# Patient Record
Sex: MALE | Race: WHITE | HISPANIC OR LATINO | Employment: FULL TIME | URBAN - METROPOLITAN AREA
[De-identification: names, ages, dates, MRNs, and addresses within clinical notes are randomized per-mention and may not be internally consistent; named-entity substitution may affect disease eponyms.]

---

## 2017-01-30 RX ORDER — LOSARTAN POTASSIUM 50 MG/1
TABLET ORAL
Qty: 30 TABLET | Refills: 0 | Status: SHIPPED | OUTPATIENT
Start: 2017-01-30 | End: 2017-02-28 | Stop reason: SDUPTHER

## 2017-01-31 NOTE — TELEPHONE ENCOUNTER
Patient is due for a follow up appointment - please review lab order section and draw any before appointment if applicable. Thank you.

## 2017-01-31 NOTE — TELEPHONE ENCOUNTER
Left message for patient to call office.  Per Dr. Mathews's notes patient needs a follow-up appt and lab appt.

## 2017-02-08 DIAGNOSIS — Z00.00 ROUTINE GENERAL MEDICAL EXAMINATION AT A HEALTH CARE FACILITY: Primary | ICD-10-CM

## 2017-02-17 ENCOUNTER — HOSPITAL ENCOUNTER (OUTPATIENT)
Dept: RADIOLOGY | Facility: HOSPITAL | Age: 52
Discharge: HOME | End: 2017-02-17
Attending: INTERNAL MEDICINE
Payer: COMMERCIAL

## 2017-02-17 ENCOUNTER — CLINICAL SUPPORT (OUTPATIENT)
Dept: INTERNAL MEDICINE | Facility: CLINIC | Age: 52
End: 2017-02-17
Payer: COMMERCIAL

## 2017-02-17 ENCOUNTER — OFFICE VISIT (OUTPATIENT)
Dept: PULMONOLOGY | Facility: CLINIC | Age: 52
End: 2017-02-17
Payer: COMMERCIAL

## 2017-02-17 ENCOUNTER — HOSPITAL ENCOUNTER (OUTPATIENT)
Dept: CARDIOLOGY | Facility: CLINIC | Age: 52
Discharge: HOME | End: 2017-02-17
Payer: COMMERCIAL

## 2017-02-17 DIAGNOSIS — M25.511 CHRONIC RIGHT SHOULDER PAIN: ICD-10-CM

## 2017-02-17 DIAGNOSIS — Z00.00 ROUTINE GENERAL MEDICAL EXAMINATION AT A HEALTH CARE FACILITY: ICD-10-CM

## 2017-02-17 DIAGNOSIS — Z00.00 ROUTINE GENERAL MEDICAL EXAMINATION AT A HEALTH CARE FACILITY: Primary | ICD-10-CM

## 2017-02-17 DIAGNOSIS — M25.511 CHRONIC RIGHT SHOULDER PAIN: Primary | ICD-10-CM

## 2017-02-17 DIAGNOSIS — Z00.00 ANNUAL PHYSICAL EXAM: Primary | ICD-10-CM

## 2017-02-17 DIAGNOSIS — G89.29 CHRONIC RIGHT SHOULDER PAIN: ICD-10-CM

## 2017-02-17 DIAGNOSIS — G89.29 CHRONIC RIGHT SHOULDER PAIN: Primary | ICD-10-CM

## 2017-02-17 LAB
ALBUMIN SERPL BCP-MCNC: 4.2 G/DL
ALP SERPL-CCNC: 46 U/L
ALT SERPL W/O P-5'-P-CCNC: 63 U/L
ANION GAP SERPL CALC-SCNC: 9 MMOL/L
AST SERPL-CCNC: 39 U/L
BILIRUB SERPL-MCNC: 0.7 MG/DL
BUN SERPL-MCNC: 16 MG/DL
CALCIUM SERPL-MCNC: 10.1 MG/DL
CHLORIDE SERPL-SCNC: 102 MMOL/L
CHOLEST/HDLC SERPL: 3.8 {RATIO}
CO2 SERPL-SCNC: 28 MMOL/L
COMPLEXED PSA SERPL-MCNC: 4.1 NG/ML
CREAT SERPL-MCNC: 1.3 MG/DL
ERYTHROCYTE [DISTWIDTH] IN BLOOD BY AUTOMATED COUNT: 14.3 %
EST. GFR  (AFRICAN AMERICAN): >60 ML/MIN/1.73 M^2
EST. GFR  (NON AFRICAN AMERICAN): >60 ML/MIN/1.73 M^2
GLUCOSE SERPL-MCNC: 91 MG/DL
HCT VFR BLD AUTO: 42.2 %
HDL/CHOLESTEROL RATIO: 26.4 %
HDLC SERPL-MCNC: 148 MG/DL
HDLC SERPL-MCNC: 39 MG/DL
HGB BLD-MCNC: 14.8 G/DL
LDLC SERPL CALC-MCNC: 82 MG/DL
MCH RBC QN AUTO: 30 PG
MCHC RBC AUTO-ENTMCNC: 35.1 %
MCV RBC AUTO: 85 FL
NONHDLC SERPL-MCNC: 109 MG/DL
PLATELET # BLD AUTO: 315 K/UL
PMV BLD AUTO: 9.6 FL
POTASSIUM SERPL-SCNC: 4.2 MMOL/L
PROT SERPL-MCNC: 7.5 G/DL
RBC # BLD AUTO: 4.94 M/UL
SODIUM SERPL-SCNC: 139 MMOL/L
TRIGL SERPL-MCNC: 135 MG/DL
WBC # BLD AUTO: 6.25 K/UL

## 2017-02-17 PROCEDURE — 80061 LIPID PANEL: CPT

## 2017-02-17 PROCEDURE — 73030 X-RAY EXAM OF SHOULDER: CPT | Mod: TC,RT

## 2017-02-17 PROCEDURE — 99396 PREV VISIT EST AGE 40-64: CPT | Mod: S$GLB,,, | Performed by: INTERNAL MEDICINE

## 2017-02-17 PROCEDURE — 73030 X-RAY EXAM OF SHOULDER: CPT | Mod: 26,RT,, | Performed by: RADIOLOGY

## 2017-02-17 PROCEDURE — 84153 ASSAY OF PSA TOTAL: CPT

## 2017-02-17 PROCEDURE — 99999 PR PBB SHADOW E&M-EST. PATIENT-LVL II: CPT | Mod: PBBFAC,,, | Performed by: INTERNAL MEDICINE

## 2017-02-17 PROCEDURE — 71020 XR CHEST PA AND LATERAL: CPT | Mod: TC

## 2017-02-17 PROCEDURE — 85027 COMPLETE CBC AUTOMATED: CPT

## 2017-02-17 PROCEDURE — 71020 XR CHEST PA AND LATERAL: CPT | Mod: 26,,, | Performed by: RADIOLOGY

## 2017-02-17 PROCEDURE — 93000 ELECTROCARDIOGRAM COMPLETE: CPT | Mod: S$GLB,,, | Performed by: INTERNAL MEDICINE

## 2017-02-17 PROCEDURE — 80053 COMPREHEN METABOLIC PANEL: CPT

## 2017-02-17 NOTE — LETTER
February 17, 2017    Jerome Almazan  [address] [place] [address]             [facility] - Pulmonary Services  [address]  [place] [address]  Phone: [phone] Dear Mr. Almazan:    Thank you for allowing me to serve you and perform your Executive Health exam on 2/17/2017. This letter will serve as a brief summary of the physical findings and laboratory/studies performed and recommendations at this time. At this visit there is a minor elevation of one liver enzyme and the PSA is 4.1 (<4.0). I would ask to stop using any testosterone products and in June contact me so I gave repeat those two lab tests. The x-ray of the right shoulder is clear, no defects seen.          If you have any questions or concerns, please don't hesitate to call.    Sincerely,        aJylen Rasmussen MD

## 2017-02-17 NOTE — PROGRESS NOTES
Subjective:       Patient ID: Jerome Almazan is a 51 y.o. male.    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 He is an avid weight , but has reduced his training and is no longer using heavy weight trying to be a . He has a knot on his right shoulder that he is concerned about. Is doesn't limit his activities. No significant illnesses since his last visit.  Review of Systems   Constitutional: Negative.    HENT: Negative.    Eyes: Negative.    Respiratory: Negative.    Cardiovascular: Negative.    Gastrointestinal: Negative.    Genitourinary: Negative.    Musculoskeletal: Negative.         Lump on right supraclavicular area. Not a node feels like a andrés prominence   Skin: Negative.    Neurological: Negative.    Psychiatric/Behavioral: Negative.    All other systems reviewed and are negative.      Objective:      Physical Exam   Constitutional: He is oriented to person, place, and time. He appears well-developed and well-nourished.   HENT:   Head: Normocephalic and atraumatic.   Right Ear: External ear normal.   Left Ear: External ear normal.   Eyes: Conjunctivae and EOM are normal. Pupils are equal, round, and reactive to light.   Neck: Normal range of motion. Neck supple.   Cardiovascular: Normal rate, regular rhythm and normal heart sounds.    Pulmonary/Chest: Effort normal and breath sounds normal.   Peak flow 550 l/min   Abdominal: Soft. Bowel sounds are normal.   Musculoskeletal: Normal range of motion.   Neurological: He is alert and oriented to person, place, and time. He has normal reflexes.   Skin: Skin is warm and dry.   Psychiatric: He has a normal mood and affect. His behavior is normal. Judgment and thought content normal.       Assessment:       No diagnosis found.    Plan:       Labs: A mild elevation of transaminase: SGPT ,and PSA is 4.1 need  To repeat both in six months. Chest x-ray is clear.  Shoulder x-ray is negative and EKG is normal. IMP Minimal elevation of PSA refrain from using testosterone produces.

## 2017-02-28 DIAGNOSIS — R97.20 ABNORMAL PSA: Primary | ICD-10-CM

## 2017-02-28 RX ORDER — LOSARTAN POTASSIUM 50 MG/1
TABLET ORAL
Qty: 30 TABLET | Refills: 2 | Status: SHIPPED | OUTPATIENT
Start: 2017-02-28 | End: 2017-05-02 | Stop reason: SDUPTHER

## 2017-03-01 NOTE — TELEPHONE ENCOUNTER
Spoke with pt in regards to scheduling urology appt. Pt stated that Dr. Rasmussen informed him to stop taking/using testosterone product and FU with urology in 6 months to see if PSA level dropped as a result of discontinuing testosterone. Pt stated that he would rather hold off on scheduling urology appt at this time.

## 2017-05-02 RX ORDER — LOSARTAN POTASSIUM 50 MG/1
50 TABLET ORAL DAILY
Qty: 30 TABLET | Refills: 0 | Status: SHIPPED | OUTPATIENT
Start: 2017-05-02 | End: 2017-11-17 | Stop reason: SDUPTHER

## 2017-05-02 NOTE — TELEPHONE ENCOUNTER
----- Message from Mae Saucedo sent at 5/2/2017  2:35 PM CDT -----  Contact: Pt  Pt is calling to have medication authorization called in to pharmacy for losartan (COZAAR) 50 MG tablet.    [facility] can be reached at [phone].  Pt can be reached at [phone].    Thank you

## 2017-06-01 RX ORDER — LOSARTAN POTASSIUM 50 MG/1
TABLET ORAL
Qty: 30 TABLET | Refills: 0 | Status: SHIPPED | OUTPATIENT
Start: 2017-06-01 | End: 2017-09-07

## 2017-07-05 RX ORDER — LOSARTAN POTASSIUM 50 MG/1
TABLET ORAL
Qty: 30 TABLET | Refills: 0 | Status: SHIPPED | OUTPATIENT
Start: 2017-07-05 | End: 2017-09-07

## 2017-08-04 RX ORDER — LOSARTAN POTASSIUM 50 MG/1
TABLET ORAL
Qty: 30 TABLET | Refills: 0 | Status: SHIPPED | OUTPATIENT
Start: 2017-08-04 | End: 2017-09-07

## 2017-09-07 DIAGNOSIS — I10 HYPERTENSION, ESSENTIAL: Primary | ICD-10-CM

## 2017-09-07 RX ORDER — LOSARTAN POTASSIUM 50 MG/1
TABLET ORAL
Qty: 30 TABLET | Refills: 0 | Status: SHIPPED | OUTPATIENT
Start: 2017-09-07 | End: 2017-11-13

## 2017-09-12 ENCOUNTER — TELEPHONE (OUTPATIENT)
Dept: INTERNAL MEDICINE | Facility: CLINIC | Age: 52
End: 2017-09-12

## 2017-09-12 DIAGNOSIS — R97.20 ABNORMAL PSA: Primary | ICD-10-CM

## 2017-09-12 NOTE — TELEPHONE ENCOUNTER
Please schedule patient for an appointment with me. Will discuss PSA at that time. It looks like was elevated in the past and would need a follow up with urology. I'll place referral too. Thank you.

## 2017-09-12 NOTE — TELEPHONE ENCOUNTER
----- Message from Anjelica Wooten sent at 9/12/2017 10:55 AM CDT -----  Contact: patient [phone]  Pt is returning nurse's call from last week. He really wants to get in to see Dr Mathews for a shot for pain in his shoulder/ check psa. Pt works for [employer] and has been unable to call you back until today. Please call him asap.

## 2017-09-12 NOTE — TELEPHONE ENCOUNTER
Pt states he is starting to have pain in his right shoulder and is requesting a Cortizone shot or an X-ray. Also pt would like labs orders placed to check is PSA. Pt is only available this Friday due to his job. Please advise

## 2017-09-13 NOTE — TELEPHONE ENCOUNTER
----- Message from Alonzo Alphonse sent at 9/13/2017  2:20 PM CDT -----  Contact: Pt   Pt would like the nurse to give him call back in regards to a missed call .. Pt stated as soon as he answered  hung up he was waiting on Doctor Jhoan nurse to give him a call .. Contact number [phone]..

## 2017-11-13 ENCOUNTER — HOSPITAL ENCOUNTER (OUTPATIENT)
Dept: RADIOLOGY | Facility: HOSPITAL | Age: 52
Discharge: HOME | End: 2017-11-13
Attending: FAMILY MEDICINE
Payer: COMMERCIAL

## 2017-11-13 ENCOUNTER — TELEPHONE (OUTPATIENT)
Dept: INTERNAL MEDICINE | Facility: CLINIC | Age: 52
End: 2017-11-13

## 2017-11-13 ENCOUNTER — OFFICE VISIT (OUTPATIENT)
Dept: INTERNAL MEDICINE | Facility: CLINIC | Age: 52
End: 2017-11-13
Attending: FAMILY MEDICINE
Payer: COMMERCIAL

## 2017-11-13 VITALS
WEIGHT: 197.19 LBS | DIASTOLIC BLOOD PRESSURE: 84 MMHG | BODY MASS INDEX: 33.66 KG/M2 | HEIGHT: 64 IN | HEART RATE: 71 BPM | OXYGEN SATURATION: 97 % | SYSTOLIC BLOOD PRESSURE: 136 MMHG

## 2017-11-13 DIAGNOSIS — M75.101 ROTATOR CUFF SYNDROME OF RIGHT SHOULDER: ICD-10-CM

## 2017-11-13 DIAGNOSIS — S43.004A DISLOCATION OF RIGHT SHOULDER JOINT, INITIAL ENCOUNTER: Primary | ICD-10-CM

## 2017-11-13 DIAGNOSIS — S43.004A DISLOCATION OF RIGHT SHOULDER JOINT, INITIAL ENCOUNTER: ICD-10-CM

## 2017-11-13 DIAGNOSIS — R97.20 ABNORMAL PSA: ICD-10-CM

## 2017-11-13 DIAGNOSIS — M75.101 ROTATOR CUFF SYNDROME OF RIGHT SHOULDER: Primary | ICD-10-CM

## 2017-11-13 PROCEDURE — 73000 X-RAY EXAM OF COLLAR BONE: CPT | Mod: 26,RT,, | Performed by: RADIOLOGY

## 2017-11-13 PROCEDURE — 73000 X-RAY EXAM OF COLLAR BONE: CPT | Mod: TC,RT

## 2017-11-13 PROCEDURE — 99999 PR PBB SHADOW E&M-EST. PATIENT-LVL V: CPT | Mod: PBBFAC,,, | Performed by: FAMILY MEDICINE

## 2017-11-13 PROCEDURE — 73030 X-RAY EXAM OF SHOULDER: CPT | Mod: TC,RT

## 2017-11-13 PROCEDURE — 73030 X-RAY EXAM OF SHOULDER: CPT | Mod: 26,RT,, | Performed by: RADIOLOGY

## 2017-11-13 PROCEDURE — 99214 OFFICE O/P EST MOD 30 MIN: CPT | Mod: S$GLB,,, | Performed by: FAMILY MEDICINE

## 2017-11-13 RX ORDER — TRAMADOL HYDROCHLORIDE 50 MG/1
50-100 TABLET ORAL 2 TIMES DAILY PRN
Qty: 20 TABLET | Refills: 0 | Status: SHIPPED | OUTPATIENT
Start: 2017-11-13 | End: 2018-02-16

## 2017-11-13 NOTE — TELEPHONE ENCOUNTER
Please add patient in at the 3:20 time spot as an override, but his is coming in at 11:00 - see stat orders for R shoulder xray and go ahead and get those when he gets here. Thank you.

## 2017-11-13 NOTE — TELEPHONE ENCOUNTER
----- Message from Janice Rod sent at 11/13/2017  8:26 AM CST -----  Contact: self/[phone]  Patient called in regards needing to talk with Dr Mathews about getting an appointment today, patient dislocate his right shoulder. Please call and advise.       Thank you!!!

## 2017-11-13 NOTE — TELEPHONE ENCOUNTER
Spoke to pt. Pt would like to be seen today he states that he dislocated his shoulder last night and then popped it back into place. He states that he is in severe pain. A urgent care appointment with other colleges was offered. Pt declined the urgent care appointment he states that he only wants to see his pcp and today. Pt would like a call back before 12 noon.  Please advise

## 2017-11-13 NOTE — PROGRESS NOTES
Subjective:       Patient ID: Jerome Almazan is a 52 y.o. male.    Chief Complaint: Shoulder Injury (rt shoulder )    HPI  Review of Systems   Constitutional: Negative for chills, fatigue and fever.   HENT: Negative for congestion and trouble swallowing.    Eyes: Negative for redness.   Respiratory: Negative for cough, chest tightness and shortness of breath.    Cardiovascular: Negative for chest pain, palpitations and leg swelling.   Gastrointestinal: Negative for abdominal pain and blood in stool.   Genitourinary: Negative for hematuria.   Musculoskeletal: Positive for arthralgias. Negative for back pain, gait problem, joint swelling, myalgias and neck pain.   Skin: Negative for color change and rash.   Neurological: Negative for tremors, speech difficulty, weakness, numbness and headaches.   Hematological: Negative for adenopathy. Bruises/bleeds easily.   Psychiatric/Behavioral: Negative for behavioral problems, confusion and sleep disturbance. The patient is not nervous/anxious.        Objective:      Physical Exam   Constitutional: He is oriented to person, place, and time. He appears well-developed and well-nourished. No distress.   Neck: Normal range of motion and full passive range of motion without pain. Neck supple. No thyroid mass present.   Pulmonary/Chest: Effort normal.   Musculoskeletal: He exhibits no edema.        Right shoulder: He exhibits decreased range of motion and tenderness. He exhibits no bony tenderness, no swelling, no effusion, no crepitus, no deformity, no laceration, no pain, no spasm, normal pulse and normal strength.        Left shoulder: He exhibits normal range of motion, no tenderness, no bony tenderness, no swelling, no effusion, no crepitus, no deformity, no laceration, no pain, no spasm, normal pulse and normal strength.        Cervical back: He exhibits normal range of motion, no tenderness and no spasm.        Right hand: He exhibits normal range of motion, no  tenderness, normal capillary refill and no swelling. Normal sensation noted. Decreased sensation is not present in the ulnar distribution, is not present in the medial distribution and is not present in the radial distribution. Normal strength noted. He exhibits no finger abduction, no thumb/finger opposition and no wrist extension trouble.        Left hand: He exhibits normal range of motion, no tenderness, normal capillary refill and no swelling. Normal sensation noted. Decreased sensation is not present in the ulnar distribution, is not present in the medial redistribution and is not present in the radial distribution. Normal strength noted. He exhibits no finger abduction, no thumb/finger opposition and no wrist extension trouble.        Right lower leg: He exhibits no edema.        Left lower leg: He exhibits no edema.   Neurological: He is alert and oriented to person, place, and time. He has normal strength. No cranial nerve deficit or sensory deficit. Coordination and gait normal.   Reflex Scores:       Tricep reflexes are 2+ on the right side and 2+ on the left side.       Bicep reflexes are 2+ on the right side and 2+ on the left side.       Brachioradialis reflexes are 2+ on the right side and 2+ on the left side.  Skin: Skin is warm and dry. No rash noted.   Psychiatric: He has a normal mood and affect. His behavior is normal. Judgment and thought content normal.   Nursing note and vitals reviewed.      Assessment:       1. Rotator cuff syndrome of right shoulder    2. Abnormal PSA    3. Dislocation of right shoulder joint, initial encounter        Plan:   Jerome was seen today for shoulder injury.    Diagnoses and all orders for this visit:    Rotator cuff syndrome of right shoulder  -     MRI Upper Extremity Joint Without Contraast Right; Future  -     Ambulatory Referral to Sports Medicine  -     X-Ray Clavicle Right; Future    Abnormal PSA  -     Ambulatory referral to Urology    Dislocation of  right shoulder joint, initial encounter  -     MRI Upper Extremity Joint Without Contraast Right; Future  -     Ambulatory Referral to Sports Medicine  -     X-Ray Clavicle Right; Future    Other orders  -     traMADol (ULTRAM) 50 mg tablet; Take 1-2 tablets ( mg total) by mouth 2 (two) times daily as needed for Pain.      See meds, orders, follow up, routing and instructions sections of encounter.  A 52-year-old established male patient who is in for right shoulder, states he   dislocated it yesterday, he fell over a trash can at his yard.  He reports pain   anteriorly over the mid clavicle area.  He states yesterday there was a bump.    He pulled his arm forward with his other arm and states the patellar clunk.    Motion is decreased.  He has no numbness, tingling, weakness or loss of function   distally.    PHYSICAL EXAMINATION:  Reduced AFE, reduced AFE strength, internal and external   rotation were both tender.  He had some reduction in the internal the internal   rotation with the thumb behind the back.    He was tender at the AC, which did not appear  and he had a bruise to   the mid clavicle with no deformity.  His impingement signs are equivocal.    [facility]'s test not available.    Radial, ulnar, and median nerve functions distally were intact, distally   vascular intact.    IMPRESSION:  1.  Questionable right shoulder dislocation.  2.  A six-month history of progressive right shoulder discomfort.  3.  Status post contralateral left rotator cuff repair.  4.  Hypertension.  5.  Elevated PSA.    RECOMMENDATIONS:    1.  He is overdue for Urology followup based on his PSA from [email or web address]   last year.  We will attempt to schedule that.  2.  MRI shoulder, rule out rotator cuff tear and to assess humeral head for bony   contusion.  3.  Clavicle x-ray.  4.  Shoulder x-ray today, normal.      OTILIA/HN  dd: 11/13/2017 12:30:04 (CST)  td: 11/14/2017 00:44:42 (CST)  Doc ID   #[id number]  Job ID  #[id number]    CC:

## 2017-11-14 ENCOUNTER — TELEPHONE (OUTPATIENT)
Dept: INTERNAL MEDICINE | Facility: CLINIC | Age: 52
End: 2017-11-14

## 2017-11-14 ENCOUNTER — HOSPITAL ENCOUNTER (OUTPATIENT)
Dept: RADIOLOGY | Facility: HOSPITAL | Age: 52
Discharge: HOME | End: 2017-11-14
Attending: FAMILY MEDICINE
Payer: COMMERCIAL

## 2017-11-14 DIAGNOSIS — S43.004A DISLOCATION OF RIGHT SHOULDER JOINT, INITIAL ENCOUNTER: ICD-10-CM

## 2017-11-14 DIAGNOSIS — M75.101 ROTATOR CUFF SYNDROME OF RIGHT SHOULDER: ICD-10-CM

## 2017-11-14 PROCEDURE — 73221 MRI JOINT UPR EXTREM W/O DYE: CPT | Mod: TC,RT

## 2017-11-14 PROCEDURE — 73221 MRI JOINT UPR EXTREM W/O DYE: CPT | Mod: 26,RT,, | Performed by: RADIOLOGY

## 2017-11-14 NOTE — TELEPHONE ENCOUNTER
----- Message from Janice Rod sent at 11/14/2017  9:39 AM CST -----  Contact: self/[phone]  Patient called in regards needing to talk with Dr Mathews about his MRI. Patient stated that he can not wait until 11/22/17 and that he will come to the office today. Please call and advise.       Thank you!!!

## 2017-11-14 NOTE — TELEPHONE ENCOUNTER
Spoke w/ pt. Informed him that the earliest appointment at [place] is 11/15 tat 2 pm. If he wanted the MRI to be done today he would have to go to the [facility] location. Pt stated that he would call back.   JOSE Kay

## 2017-11-15 ENCOUNTER — TELEPHONE (OUTPATIENT)
Dept: INTERNAL MEDICINE | Facility: CLINIC | Age: 52
End: 2017-11-15

## 2017-11-15 NOTE — TELEPHONE ENCOUNTER
Pt came to the clinic today. Requesting test results. 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 he is needing a return to work note for the past 3 days, if MRI is clear, if not pt will need FMLA forms filled out. Pt states he has full range of motion and having no pain.  Please advise

## 2017-11-15 NOTE — TELEPHONE ENCOUNTER
Called patient and discussed labs and or test results. Patient expressed understanding and had the opportunity to ask questions. Any questions were answered. See meds, orders, follow up and instructions sections of encounter.    Specific issues include:  MRI discussed - offered, but does not want to see Ortho, wants to go back to work - see letter

## 2017-11-16 ENCOUNTER — TELEPHONE (OUTPATIENT)
Dept: INTERNAL MEDICINE | Facility: CLINIC | Age: 52
End: 2017-11-16

## 2017-11-16 NOTE — TELEPHONE ENCOUNTER
Spoke with pt, he is requesting a new letter be print for him to return back to work on tomorrow 11/17.    Please advise

## 2017-11-16 NOTE — TELEPHONE ENCOUNTER
----- Message from Clover Kay MA sent at 11/16/2017  9:40 AM CST -----  Contact: pt [phone] cell      ----- Message -----  From: Tati Vázquez  Sent: 11/16/2017   9:18 AM  To: Reid ESCOBAR Staff    Pt would like a call from the nurse pt is asking can he get a return back to work for tomorrow 11- at a 100% pt would like it fax to [phone],pt is asking can this be fax in an hour because of his pay roll at work,please advise pt

## 2017-11-17 RX ORDER — LOSARTAN POTASSIUM 50 MG/1
TABLET ORAL
Qty: 90 TABLET | Refills: 0 | Status: SHIPPED | OUTPATIENT
Start: 2017-11-17 | End: 2018-03-14 | Stop reason: SDUPTHER

## 2018-02-05 ENCOUNTER — TELEPHONE (OUTPATIENT)
Dept: INTERNAL MEDICINE | Facility: CLINIC | Age: 53
End: 2018-02-05

## 2018-02-05 DIAGNOSIS — I10 HYPERTENSION, ESSENTIAL: Primary | ICD-10-CM

## 2018-02-05 NOTE — TELEPHONE ENCOUNTER
Please advise ----- Message from Migdalia Lopez sent at 2/5/2018  8:50 AM CST -----  Contact: Patient [phone]  Requesting a cardiologist.    Please call and advise.    Thank You

## 2018-02-16 ENCOUNTER — TELEPHONE (OUTPATIENT)
Dept: CARDIOLOGY | Facility: CLINIC | Age: 53
End: 2018-02-16

## 2018-02-16 ENCOUNTER — HOSPITAL ENCOUNTER (OUTPATIENT)
Dept: CARDIOLOGY | Facility: CLINIC | Age: 53
Discharge: HOME | End: 2018-02-16
Attending: FAMILY MEDICINE
Payer: COMMERCIAL

## 2018-02-16 ENCOUNTER — HOSPITAL ENCOUNTER (OUTPATIENT)
Dept: CARDIOLOGY | Facility: CLINIC | Age: 53
Discharge: HOME | End: 2018-02-16
Attending: INTERNAL MEDICINE
Payer: COMMERCIAL

## 2018-02-16 ENCOUNTER — OFFICE VISIT (OUTPATIENT)
Dept: CARDIOLOGY | Facility: CLINIC | Age: 53
End: 2018-02-16
Payer: COMMERCIAL

## 2018-02-16 VITALS
HEART RATE: 78 BPM | HEIGHT: 64 IN | BODY MASS INDEX: 33.2 KG/M2 | DIASTOLIC BLOOD PRESSURE: 100 MMHG | SYSTOLIC BLOOD PRESSURE: 157 MMHG | WEIGHT: 194.44 LBS

## 2018-02-16 DIAGNOSIS — R07.9 CHEST PAIN AT REST: ICD-10-CM

## 2018-02-16 DIAGNOSIS — I10 HYPERTENSION, ESSENTIAL: ICD-10-CM

## 2018-02-16 DIAGNOSIS — R07.9 CHEST PAIN AT REST: Primary | ICD-10-CM

## 2018-02-16 DIAGNOSIS — Z91.89 AT RISK FOR CORONARY ARTERY DISEASE: ICD-10-CM

## 2018-02-16 LAB — DIASTOLIC DYSFUNCTION: NO

## 2018-02-16 PROCEDURE — 93000 ELECTROCARDIOGRAM COMPLETE: CPT | Mod: S$GLB,,, | Performed by: INTERNAL MEDICINE

## 2018-02-16 PROCEDURE — 99204 OFFICE O/P NEW MOD 45 MIN: CPT | Mod: S$GLB,,, | Performed by: INTERNAL MEDICINE

## 2018-02-16 PROCEDURE — 99999 PR PBB SHADOW E&M-EST. PATIENT-LVL IV: CPT | Mod: PBBFAC,,, | Performed by: INTERNAL MEDICINE

## 2018-02-16 PROCEDURE — 93015 CV STRESS TEST SUPVJ I&R: CPT | Mod: S$GLB,,, | Performed by: INTERNAL MEDICINE

## 2018-02-16 PROCEDURE — 3008F BODY MASS INDEX DOCD: CPT | Mod: S$GLB,,, | Performed by: INTERNAL MEDICINE

## 2018-02-16 NOTE — LETTER
February 16, 2018      Broderick Mathews MD  [address]  [place] [address]           [facility] - Cardiology  [address]  [facility] [address]  Phone: [phone]          Patient: Jerome Almazan   MR Number: [id number]   YOB: 1965   Date of Visit: 2/16/2018       Dear Dr. Broderick Mathews:    Thank you for referring Jerome Almazan to me for evaluation. Attached you will find relevant portions of my assessment and plan of care.    If you have questions, please do not hesitate to call me. I look forward to following Jerome Almazan along with you.    Sincerely,    Esequiel Mccain MD    Enclosure  CC:  No Recipients    If you would like to receive this communication electronically, please contact [email or web address] or [phone] to request more information on [employer] access.    For providers and/or their staff who would like to refer a patient to [facility], please contact us through our one-stop-shop provider referral line, [facility], at [phone].    If you feel you have received this communication in error or would no longer like to receive these types of communications, please e-mail [email or web address]

## 2018-02-16 NOTE — PROGRESS NOTES
"Subjective:   Patient ID:  Jerome Almazan is a 52 y.o. male who presents for evaluation of Hypertension and Chest Pain      HPI:   Jerome Almazan presents for evaluation of chest pain. Over a week ago the patient noted anterior chest pressure that began on the right upper anterior cheat and spread to the left. It lasted all day. No radiation or associated sxs. He has since done aerobic activity with his wife ( walking brislky for 45 minutes ) with no further discomfort. Jerome Almazan has hypertension with elevation this visit but has not taken his medication. HDL low with LDL < 100 last check..Eliaeetyra  of a " heart attack" just shy of 60. Had an a apparent " hole in her heart"  fixed .    Review of Systems   Constitution: Negative for weakness, malaise/fatigue, weight gain and weight loss.   Eyes: Negative for blurred vision.   Cardiovascular: Positive for chest pain. Negative for claudication, cyanosis, dyspnea on exertion, irregular heartbeat, leg swelling, near-syncope, orthopnea, palpitations, paroxysmal nocturnal dyspnea and syncope.   Respiratory: Negative for cough, shortness of breath and wheezing.    Musculoskeletal: Negative for falls and myalgias.   Gastrointestinal: Negative for abdominal pain, heartburn, nausea and vomiting.   Genitourinary: Negative for nocturia.   Neurological: Negative for brief paralysis, dizziness, focal weakness, headaches, numbness and paresthesias.   Psychiatric/Behavioral: Negative for altered mental status.       Current Outpatient Prescriptions   Medication Sig    losartan (COZAAR) 50 MG tablet TAKE 1 TABLET BY MOUTH DAILY     No current facility-administered medications for this visit.      Objective:   Physical Exam   Constitutional: He is oriented to person, place, and time. No distress.   BP (!) 157/100   Pulse 78   Ht 5' 4" (1.626 m)   Wt 88.2 kg (194 lb 7.1 oz)   BMI 33.38 kg/m²    HENT:   Head: Normocephalic.   Right Ear: " External ear normal.   Left Ear: External ear normal.   Nose: Nose normal.   Eyes: Conjunctivae are normal. Pupils are equal, round, and reactive to light. No scleral icterus.   Neck: Neck supple. No JVD present. No thyromegaly present.   Cardiovascular: Normal rate, regular rhythm, normal heart sounds and intact distal pulses.  PMI is not displaced.  Exam reveals no gallop and no friction rub.    No murmur heard.  Pulmonary/Chest: Effort normal and breath sounds normal. No respiratory distress. He has no wheezes. He has no rales.   Abdominal: Soft. He exhibits no distension. There is no hepatosplenomegaly. There is no tenderness.   Musculoskeletal: He exhibits no edema or tenderness.   Gait normal   Neurological: He is alert and oriented to person, place, and time. No cranial nerve deficit.   Skin: Skin is warm and dry. No rash noted. He is not diaphoretic.   Psychiatric: He has a normal mood and affect. His behavior is normal.       Lab Results   Component Value Date     02/17/2017    K 4.2 02/17/2017     02/17/2017    CO2 28 02/17/2017    BUN 16 02/17/2017    CREATININE 1.3 02/17/2017    GLU 91 02/17/2017    AST 39 02/17/2017    ALT 63 (H) 02/17/2017    ALBUMIN 4.2 02/17/2017    PROT 7.5 02/17/2017    BILITOT 0.7 02/17/2017    WBC 6.25 02/17/2017    HGB 14.8 02/17/2017    HCT 42.2 02/17/2017    MCV 85 02/17/2017     02/17/2017    TSH 0.908 12/11/2015    CHOL 148 02/17/2017    HDL 39 (L) 02/17/2017    LDLCALC 82.0 02/17/2017    TRIG 135 02/17/2017       Assessment:     1. Chest pain at rest : Atypical for myocardial ischemia clinically   2. Hypertension, essential : Elevation but didn't take medication   3. At risk for coronary artery disease: Low HDL ,? Family hx, hypertension       Plan:     Jerome was seen today for hypertension and chest pain.    Diagnoses and all orders for this visit:    Chest pain at rest  -     EKG 12-lead; Future  -     Cardiac treadmill stress test;  Future    Hypertension, essential  Suggest he routinely monitor at home  At risk for coronary artery disease  -     CT Cardiac Scoring; Future; Expected date: 02/16/2018  .Mediteranian diet recommended and discussed

## 2018-02-16 NOTE — PATIENT INSTRUCTIONS
It is recommended that  blood pressure be checked 3-4 times a week and a log  maintained brought with you to your Dr the next visit.  .Mediteranian diet recommended

## 2018-03-14 RX ORDER — LOSARTAN POTASSIUM 50 MG/1
TABLET ORAL
Qty: 90 TABLET | Refills: 0 | Status: SHIPPED | OUTPATIENT
Start: 2018-03-14 | End: 2018-06-21 | Stop reason: SDUPTHER

## 2018-04-11 ENCOUNTER — TELEPHONE (OUTPATIENT)
Dept: INTERNAL MEDICINE | Facility: CLINIC | Age: 53
End: 2018-04-11

## 2018-04-11 NOTE — TELEPHONE ENCOUNTER
Pt called stated that he had a wellness check up with his job and his glucose results was 65. Pt is concerned that he might be a diabetic. Please advise

## 2018-04-17 DIAGNOSIS — Z00.00 ROUTINE GENERAL MEDICAL EXAMINATION AT A HEALTH CARE FACILITY: Primary | ICD-10-CM

## 2018-04-27 ENCOUNTER — OFFICE VISIT (OUTPATIENT)
Dept: PULMONOLOGY | Facility: CLINIC | Age: 53
End: 2018-04-27
Payer: COMMERCIAL

## 2018-04-27 ENCOUNTER — CLINICAL SUPPORT (OUTPATIENT)
Dept: INTERNAL MEDICINE | Facility: CLINIC | Age: 53
End: 2018-04-27
Payer: COMMERCIAL

## 2018-04-27 ENCOUNTER — HOSPITAL ENCOUNTER (OUTPATIENT)
Dept: CARDIOLOGY | Facility: CLINIC | Age: 53
Discharge: HOME | End: 2018-04-27
Payer: COMMERCIAL

## 2018-04-27 VITALS
WEIGHT: 190.5 LBS | HEIGHT: 64 IN | DIASTOLIC BLOOD PRESSURE: 92 MMHG | BODY MASS INDEX: 32.52 KG/M2 | SYSTOLIC BLOOD PRESSURE: 141 MMHG | HEART RATE: 73 BPM

## 2018-04-27 DIAGNOSIS — Z00.00 ROUTINE GENERAL MEDICAL EXAMINATION AT A HEALTH CARE FACILITY: ICD-10-CM

## 2018-04-27 DIAGNOSIS — Z00.00 ROUTINE GENERAL MEDICAL EXAMINATION AT A HEALTH CARE FACILITY: Primary | ICD-10-CM

## 2018-04-27 DIAGNOSIS — Z00.00 ANNUAL PHYSICAL EXAM: Primary | ICD-10-CM

## 2018-04-27 LAB
ALBUMIN SERPL BCP-MCNC: 4.2 G/DL
ALP SERPL-CCNC: 49 U/L
ALT SERPL W/O P-5'-P-CCNC: 41 U/L
ANION GAP SERPL CALC-SCNC: 8 MMOL/L
AST SERPL-CCNC: 27 U/L
BILIRUB SERPL-MCNC: 0.4 MG/DL
BUN SERPL-MCNC: 29 MG/DL
CALCIUM SERPL-MCNC: 9.9 MG/DL
CHLORIDE SERPL-SCNC: 105 MMOL/L
CHOLEST SERPL-MCNC: 150 MG/DL
CHOLEST/HDLC SERPL: 4.3 {RATIO}
CO2 SERPL-SCNC: 27 MMOL/L
COMPLEXED PSA SERPL-MCNC: 2.9 NG/ML
CREAT SERPL-MCNC: 1.4 MG/DL
ERYTHROCYTE [DISTWIDTH] IN BLOOD BY AUTOMATED COUNT: 13.6 %
EST. GFR  (AFRICAN AMERICAN): >60 ML/MIN/1.73 M^2
EST. GFR  (NON AFRICAN AMERICAN): 56.6 ML/MIN/1.73 M^2
GLUCOSE SERPL-MCNC: 98 MG/DL
HCT VFR BLD AUTO: 42.4 %
HDLC SERPL-MCNC: 35 MG/DL
HDLC SERPL: 23.3 %
HGB BLD-MCNC: 14 G/DL
LDLC SERPL CALC-MCNC: 93.2 MG/DL
MCH RBC QN AUTO: 28.9 PG
MCHC RBC AUTO-ENTMCNC: 33 G/DL
MCV RBC AUTO: 87 FL
NONHDLC SERPL-MCNC: 115 MG/DL
PLATELET # BLD AUTO: 275 K/UL
PMV BLD AUTO: 9.6 FL
POTASSIUM SERPL-SCNC: 4.5 MMOL/L
PROT SERPL-MCNC: 7.4 G/DL
RBC # BLD AUTO: 4.85 M/UL
SODIUM SERPL-SCNC: 140 MMOL/L
TRIGL SERPL-MCNC: 109 MG/DL
WBC # BLD AUTO: 6.93 K/UL

## 2018-04-27 PROCEDURE — 85027 COMPLETE CBC AUTOMATED: CPT

## 2018-04-27 PROCEDURE — 80061 LIPID PANEL: CPT

## 2018-04-27 PROCEDURE — 80053 COMPREHEN METABOLIC PANEL: CPT

## 2018-04-27 PROCEDURE — 84153 ASSAY OF PSA TOTAL: CPT

## 2018-04-27 PROCEDURE — 99396 PREV VISIT EST AGE 40-64: CPT | Mod: S$GLB,,, | Performed by: INTERNAL MEDICINE

## 2018-04-27 PROCEDURE — 99999 PR PBB SHADOW E&M-EST. PATIENT-LVL III: CPT | Mod: PBBFAC,,, | Performed by: INTERNAL MEDICINE

## 2018-04-27 PROCEDURE — 93000 ELECTROCARDIOGRAM COMPLETE: CPT | Mod: S$GLB,,, | Performed by: INTERNAL MEDICINE

## 2018-04-27 NOTE — LETTER
April 30, 2018    Jerome Almazan  [address] [place] [address]             [facility] - Pulmonary Services  [address]  [facility] [address]  Phone: [phone] Dear Mr. Almazan:    Thank you for allowing me to serve you and perform your Executive Health exam on 4/27/2018. This letter will serve as a brief summary of the physical findings and laboratory/studies performed and recommendations at this time. You assessment is essentially normal except for your kidney test. You need to consume 8-10 glasses of liquid a day and take your cozaar daily.        If you have any questions or concerns, please don't hesitate to call.    Sincerely,        Jaylen Rasmussen MD

## 2018-04-29 NOTE — PROGRESS NOTES
"Subjective:       Patient ID: Jerome Almazan is a 52 y.o. male.    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 He is a  and has no active complaints today. He saw Dr. Mathews in November when he dislocted his right shoulder but was able to "pop" it back in himself ;has some rotator cuff issue with that shoulder.  He saw  in cardiology clinic in February but did not feel it was coronary in origin Wanted to be more compliant with taking his blood pressure medications;   Review of Systems   Constitutional: Negative.    HENT: Negative.    Eyes: Negative.    Respiratory: Negative.    Cardiovascular: Negative.         Mild hypertension   Saw Dr. Mccain in February   Gastrointestinal: Negative.    Genitourinary: Negative.    Musculoskeletal: Negative.         Hx of right shoulder dislocation   Skin: Negative.    Neurological: Negative.    Psychiatric/Behavioral: Negative.    All other systems reviewed and are negative.      Objective:      Physical Exam   Constitutional: He is oriented to person, place, and time. He appears well-developed and well-nourished.   Very muscular  One of the few who meets the description which is quoted in every report: well nourished, well developed.    HENT:   Head: Normocephalic and atraumatic.   Right Ear: External ear normal.   Left Ear: External ear normal.   Eyes: Conjunctivae and EOM are normal. Pupils are equal, round, and reactive to light.   Neck: Normal range of motion. Neck supple.   Cardiovascular: Normal rate, regular rhythm and normal heart sounds.     Repet /90    Big Arms   Pulmonary/Chest: Effort normal and breath sounds normal.   Peak flow 550 l/min   Abdominal: Soft. Bowel sounds are normal.   Musculoskeletal: Normal range of motion.   Neurological: He is alert and oriented to person, place, and time. He has normal reflexes.   Skin: Skin is warm and dry. "   Psychiatric: He has a normal mood and affect. His behavior is normal. Judgment and thought content normal.       Assessment:       No diagnosis found.    Plan:          EKG: Normal   Labs: Slightly low GFR,encouraged to increase his fluid intake. Take his cozaar daily and monitor his BP bi weekly

## 2018-06-21 RX ORDER — LOSARTAN POTASSIUM 50 MG/1
TABLET ORAL
Qty: 90 TABLET | Refills: 0 | Status: SHIPPED | OUTPATIENT
Start: 2018-06-21 | End: 2018-09-21 | Stop reason: SDUPTHER

## 2018-09-21 RX ORDER — LOSARTAN POTASSIUM 50 MG/1
TABLET ORAL
Qty: 90 TABLET | Refills: 0 | Status: SHIPPED | OUTPATIENT
Start: 2018-09-21 | End: 2018-12-21 | Stop reason: SDUPTHER

## 2018-09-21 NOTE — TELEPHONE ENCOUNTER
----- Message from Cony Van sent at 9/21/2018 12:42 PM CDT -----  Contact: Patient [phone]  Pt is calling to speak with someone in regards to his losartan (COZAAR) 50 MG tablet that was supposed to be sent over to [id number] [place] - [address] AT [facility]. Pt states that the pharmacy has not yet received that prescription refill as of yet and would like to know if it can be sent over as soon as possible. Please advise.      Thanks

## 2018-12-21 ENCOUNTER — TELEPHONE (OUTPATIENT)
Dept: INTERNAL MEDICINE | Facility: CLINIC | Age: 53
End: 2018-12-21

## 2018-12-21 RX ORDER — LOSARTAN POTASSIUM 50 MG/1
50 TABLET ORAL DAILY
Qty: 90 TABLET | Refills: 0 | Status: SHIPPED | OUTPATIENT
Start: 2018-12-21 | End: 2019-03-21 | Stop reason: SDUPTHER

## 2018-12-21 NOTE — TELEPHONE ENCOUNTER
----- Message from Lauri Bonner sent at 12/21/2018 11:00 AM CST -----  Contact: Patient [phone]  RX request - refill or new RX.  Is this a refill or new RX:  Refill  RX name and strength: losartan (COZAAR) 50 MG tablet      Pharmacy name and phone #: [id number] - [place], [place] - [address] AT [facility] [address] [phone] (Phone)  [phone] (Fax    Comments: stating will be going out of town soon, also would like a call to inform Rx has been sent to pharmacy.    Please call an advise  Thank you

## 2019-02-19 ENCOUNTER — TELEPHONE (OUTPATIENT)
Dept: INTERNAL MEDICINE | Facility: CLINIC | Age: 54
End: 2019-02-19

## 2019-02-19 NOTE — TELEPHONE ENCOUNTER
----- Message from Estelle Link sent at 2/19/2019 10:02 AM CST -----  Contact: self/[phone]      ----- Message -----  From: Anjelica Ponce  Sent: 2/19/2019   9:53 AM  To: Reid Villafana A Staff    Pt called in regards to having pain on left side and back (kidneys). He would like to talk to the dr about it. It stared mild about a month a go. The last couple of weeks it has gotten worse. He may have a small lump on the left side of lower back.    Please advise

## 2019-02-19 NOTE — TELEPHONE ENCOUNTER
Spoke with pt offered to schedule appointment. The dates and times available did not match with the days and times pt is available pt states he will call an [facility] near his home to be seen for both an annual and for the left sided pains.

## 2019-02-25 ENCOUNTER — TELEPHONE (OUTPATIENT)
Dept: INTERNAL MEDICINE | Facility: CLINIC | Age: 54
End: 2019-02-25

## 2019-02-25 NOTE — TELEPHONE ENCOUNTER
Spoke with pt, pt requesting xray to be done of his left kidney due to a small lump that he noticed. Advised pt he would have to be seen in order for an xray order to be placed. Advised there was nothing available at this location until tomorrow. Offered appointment at [facility] location pt declined stating he will wait until 3/15 to see PCP.

## 2019-03-07 DIAGNOSIS — Z11.59 ENCOUNTER FOR HEPATITIS C SCREENING TEST FOR LOW RISK PATIENT: Primary | ICD-10-CM

## 2019-03-12 ENCOUNTER — TELEPHONE (OUTPATIENT)
Dept: INTERNAL MEDICINE | Facility: CLINIC | Age: 54
End: 2019-03-12

## 2019-03-12 NOTE — TELEPHONE ENCOUNTER
Pt stated now he is having abdominal pain when bending went ahead and rescheduled to an earlier appt

## 2019-03-12 NOTE — TELEPHONE ENCOUNTER
----- Message from Anjelica Ponce sent at 3/12/2019  2:35 PM CDT -----  Contact: self/[phone]  Pt called in regard to having pain on both lower  Left side in front and back. He wanted to know if he should wait and see the dr on Friday or should he be seen sooner.      Please advise

## 2019-03-13 ENCOUNTER — OFFICE VISIT (OUTPATIENT)
Dept: INTERNAL MEDICINE | Facility: CLINIC | Age: 54
End: 2019-03-13
Attending: FAMILY MEDICINE
Payer: COMMERCIAL

## 2019-03-13 ENCOUNTER — LAB VISIT (OUTPATIENT)
Dept: LAB | Facility: HOSPITAL | Age: 54
End: 2019-03-13
Attending: FAMILY MEDICINE
Payer: COMMERCIAL

## 2019-03-13 VITALS
HEIGHT: 64 IN | BODY MASS INDEX: 30.9 KG/M2 | OXYGEN SATURATION: 98 % | WEIGHT: 181 LBS | DIASTOLIC BLOOD PRESSURE: 70 MMHG | HEART RATE: 66 BPM | SYSTOLIC BLOOD PRESSURE: 132 MMHG | TEMPERATURE: 98 F

## 2019-03-13 DIAGNOSIS — Z12.5 PROSTATE CANCER SCREENING: ICD-10-CM

## 2019-03-13 DIAGNOSIS — Z00.00 ROUTINE GENERAL MEDICAL EXAMINATION AT A HEALTH CARE FACILITY: ICD-10-CM

## 2019-03-13 DIAGNOSIS — R10.9 ABDOMINAL PAIN, UNSPECIFIED ABDOMINAL LOCATION: ICD-10-CM

## 2019-03-13 DIAGNOSIS — I10 HYPERTENSION, ESSENTIAL: ICD-10-CM

## 2019-03-13 DIAGNOSIS — E78.5 HYPERLIPIDEMIA, UNSPECIFIED HYPERLIPIDEMIA TYPE: ICD-10-CM

## 2019-03-13 DIAGNOSIS — M54.5 LOW BACK PAIN, UNSPECIFIED BACK PAIN LATERALITY, UNSPECIFIED CHRONICITY, WITH SCIATICA PRESENCE UNSPECIFIED: Primary | ICD-10-CM

## 2019-03-13 DIAGNOSIS — R10.84 GENERALIZED ABDOMINAL PAIN: ICD-10-CM

## 2019-03-13 DIAGNOSIS — R14.0 ABDOMINAL DISTENSION: ICD-10-CM

## 2019-03-13 LAB
ALBUMIN SERPL BCP-MCNC: 4.3 G/DL
ALP SERPL-CCNC: 52 U/L
ALT SERPL W/O P-5'-P-CCNC: 27 U/L
ANION GAP SERPL CALC-SCNC: 6 MMOL/L
AST SERPL-CCNC: 24 U/L
BILIRUB SERPL-MCNC: 0.3 MG/DL
BUN SERPL-MCNC: 25 MG/DL
CALCIUM SERPL-MCNC: 10.2 MG/DL
CHLORIDE SERPL-SCNC: 104 MMOL/L
CHOLEST SERPL-MCNC: 134 MG/DL
CHOLEST/HDLC SERPL: 3.3 {RATIO}
CO2 SERPL-SCNC: 28 MMOL/L
COMPLEXED PSA SERPL-MCNC: 3.1 NG/ML
CREAT SERPL-MCNC: 1.2 MG/DL
ERYTHROCYTE [DISTWIDTH] IN BLOOD BY AUTOMATED COUNT: 14.1 %
EST. GFR  (AFRICAN AMERICAN): >60 ML/MIN/1.73 M^2
EST. GFR  (NON AFRICAN AMERICAN): >60 ML/MIN/1.73 M^2
GLUCOSE SERPL-MCNC: 94 MG/DL
HCT VFR BLD AUTO: 37 %
HDLC SERPL-MCNC: 41 MG/DL
HDLC SERPL: 30.6 %
HGB BLD-MCNC: 12.1 G/DL
LDLC SERPL CALC-MCNC: 83 MG/DL
MCH RBC QN AUTO: 29.2 PG
MCHC RBC AUTO-ENTMCNC: 32.7 G/DL
MCV RBC AUTO: 89 FL
NONHDLC SERPL-MCNC: 93 MG/DL
PLATELET # BLD AUTO: 279 K/UL
PMV BLD AUTO: 10 FL
POTASSIUM SERPL-SCNC: 4.4 MMOL/L
PROT SERPL-MCNC: 7.5 G/DL
RBC # BLD AUTO: 4.14 M/UL
SODIUM SERPL-SCNC: 138 MMOL/L
TRIGL SERPL-MCNC: 50 MG/DL
WBC # BLD AUTO: 6.47 K/UL

## 2019-03-13 PROCEDURE — 99999 PR PBB SHADOW E&M-EST. PATIENT-LVL IV: ICD-10-PCS | Mod: PBBFAC,,, | Performed by: FAMILY MEDICINE

## 2019-03-13 PROCEDURE — 99214 PR OFFICE/OUTPT VISIT, EST, LEVL IV, 30-39 MIN: ICD-10-PCS | Mod: S$GLB,,, | Performed by: FAMILY MEDICINE

## 2019-03-13 PROCEDURE — 80061 LIPID PANEL: CPT

## 2019-03-13 PROCEDURE — 85027 COMPLETE CBC AUTOMATED: CPT

## 2019-03-13 PROCEDURE — 99999 PR PBB SHADOW E&M-EST. PATIENT-LVL IV: CPT | Mod: PBBFAC,,, | Performed by: FAMILY MEDICINE

## 2019-03-13 PROCEDURE — 36415 COLL VENOUS BLD VENIPUNCTURE: CPT

## 2019-03-13 PROCEDURE — 84153 ASSAY OF PSA TOTAL: CPT

## 2019-03-13 PROCEDURE — 87086 URINE CULTURE/COLONY COUNT: CPT

## 2019-03-13 PROCEDURE — 80053 COMPREHEN METABOLIC PANEL: CPT

## 2019-03-13 PROCEDURE — 99214 OFFICE O/P EST MOD 30 MIN: CPT | Mod: S$GLB,,, | Performed by: FAMILY MEDICINE

## 2019-03-13 PROCEDURE — 81001 URINALYSIS AUTO W/SCOPE: CPT

## 2019-03-13 NOTE — PROGRESS NOTES
Subjective:       Patient ID: Jerome Almazan is a 53 y.o. male.    Chief Complaint: Low-back Pain (left side ) and Abdominal Pain (when bending )    HPI  Review of Systems   Constitutional: Negative for chills, fatigue, fever and unexpected weight change.   HENT: Negative for congestion and trouble swallowing.    Eyes: Negative for redness and visual disturbance.   Respiratory: Negative for cough, chest tightness and shortness of breath.    Cardiovascular: Negative for chest pain, palpitations and leg swelling.   Gastrointestinal: Positive for abdominal pain. Negative for blood in stool.   Genitourinary: Negative for difficulty urinating and hematuria.   Musculoskeletal: Positive for arthralgias and back pain. Negative for gait problem, joint swelling, myalgias and neck pain.   Skin: Negative for color change and rash.   Neurological: Negative for tremors, speech difficulty, weakness, numbness and headaches.   Hematological: Negative for adenopathy. Does not bruise/bleed easily.   Psychiatric/Behavioral: Negative for behavioral problems, confusion and sleep disturbance. The patient is not nervous/anxious.        Objective:      Physical Exam   Constitutional: He is oriented to person, place, and time. He appears well-developed and well-nourished.   HENT:   Head: Normocephalic and atraumatic.   Eyes: Conjunctivae are normal. No scleral icterus.   Neck: Normal range of motion. Neck supple. Carotid bruit is not present.   Cardiovascular: Normal rate, regular rhythm, normal heart sounds and intact distal pulses. Exam reveals no gallop and no friction rub.   No murmur heard.  Pulmonary/Chest: Effort normal and breath sounds normal. No respiratory distress. He has no wheezes. He has no rales.   Abdominal: Soft. He exhibits no distension and no mass. There is no tenderness. There is no guarding. Hernia confirmed negative in the right inguinal area and confirmed negative in the left inguinal area.   Genitourinary:  Testes normal. Right testis shows no mass, no swelling and no tenderness. Left testis shows no mass, no swelling and no tenderness.   Musculoskeletal: He exhibits no edema or deformity.   Lymphadenopathy: No inguinal adenopathy noted on the right or left side.   Neurological: He is alert and oriented to person, place, and time. He displays no tremor. No cranial nerve deficit. Coordination and gait normal.   Skin: Skin is warm and dry. No rash noted. He is not diaphoretic. No erythema.   Psychiatric: He has a normal mood and affect. His behavior is normal. Judgment and thought content normal.   Nursing note and vitals reviewed.      Assessment:       1. Low back pain, unspecified back pain laterality, unspecified chronicity, with sciatica presence unspecified    2. Abdominal pain, unspecified abdominal location    3. Hypertension, essential    4. Hyperlipidemia, unspecified hyperlipidemia type    5. Prostate cancer screening    6. Routine general medical examination at a health care facility    7. Generalized abdominal pain    8. Abdominal distension        Plan:   Jerome was seen today for low-back pain and abdominal pain.    Diagnoses and all orders for this visit:    Low back pain, unspecified back pain laterality, unspecified chronicity, with sciatica presence unspecified  -     Urinalysis  -     Urinalysis Microscopic  -     Urine culture  -     Ambulatory Consult to [facility]    Abdominal pain, unspecified abdominal location    Hypertension, essential  -     Comprehensive metabolic panel; Standing    Hyperlipidemia, unspecified hyperlipidemia type  -     Lipid panel; Standing    Prostate cancer screening  -     PSA, Screening; Standing    Routine general medical examination at a health care facility  -     Comprehensive metabolic panel; Standing  -     Lipid panel; Standing  -     PSA, Screening; Standing  -     CBC Without Differential; Standing    Generalized abdominal pain  -     Cancel: CT  Abdomen Pelvis With Contrast; Future  -     CT Abdomen Pelvis With Contrast; Future    Abdominal distension  -     Cancel: CT Abdomen Pelvis With Contrast; Future  -     CT Abdomen Pelvis With Contrast; Future      See meds, orders, follow up, routing and instructions sections of encounter.  A 53-year-old established male patient.  He is here for left lower quadrant   abdominal pain x3 days.  He has no elimination changes.  No fever, chills,   nausea, vomiting, diarrhea or other constitutional complaints.  He has back pain   for 3 to 4 weeks.  He equates the two together; however, they did occur at   different temporal instances.  He has no radiation into the lower extremities.    No incontinence.  No dysuria or hematuria.  No rectal bleeding.    On examination, he does have left periumbilical and left lower quadrant   abdominal tenderness to palpation.  There is no hernia.  No testicular   tenderness.  His back is nontender.  He was talking about bumps in his back.  He   has some palpable muscle spasm at the left posterior pelvic brim.    RECOMMENDATIONS:  CT scan tomorrow.  Considerations include diverticulitis.  We   will hold off on antibiotics until we get his scan back.  He is due for   [facility] physical examination sometime soon.  We will refer to Back   Clinic for his back condition.  Consider physical therapy, which he was not   amenable to at this time.      OTILIA/HN  dd: 03/13/2019 16:42:59 (CDT)  td: 03/14/2019 05:19:00 (CDT)  Doc ID   #[id number]  Job ID #[id number]    CC:

## 2019-03-14 ENCOUNTER — TELEPHONE (OUTPATIENT)
Dept: INTERNAL MEDICINE | Facility: CLINIC | Age: 54
End: 2019-03-14

## 2019-03-14 ENCOUNTER — HOSPITAL ENCOUNTER (OUTPATIENT)
Dept: RADIOLOGY | Facility: HOSPITAL | Age: 54
Discharge: HOME | End: 2019-03-14
Attending: FAMILY MEDICINE
Payer: COMMERCIAL

## 2019-03-14 DIAGNOSIS — R10.84 GENERALIZED ABDOMINAL PAIN: ICD-10-CM

## 2019-03-14 DIAGNOSIS — R79.89 ABNORMAL COMPLETE BLOOD COUNT: Primary | ICD-10-CM

## 2019-03-14 DIAGNOSIS — R14.0 ABDOMINAL DISTENSION: ICD-10-CM

## 2019-03-14 LAB
BILIRUB UR QL STRIP: NEGATIVE
CLARITY UR REFRACT.AUTO: CLEAR
COLOR UR AUTO: YELLOW
GLUCOSE UR QL STRIP: NEGATIVE
HGB UR QL STRIP: NEGATIVE
KETONES UR QL STRIP: NEGATIVE
LEUKOCYTE ESTERASE UR QL STRIP: NEGATIVE
MICROSCOPIC COMMENT: NORMAL
NITRITE UR QL STRIP: NEGATIVE
PH UR STRIP: 6 [PH] (ref 5–8)
PROT UR QL STRIP: NEGATIVE
SP GR UR STRIP: 1.02 (ref 1–1.03)
URN SPEC COLLECT METH UR: NORMAL

## 2019-03-14 PROCEDURE — 74177 CT ABDOMEN PELVIS WITH CONTRAST: ICD-10-PCS | Mod: 26,,, | Performed by: RADIOLOGY

## 2019-03-14 PROCEDURE — 25500020 PHARM REV CODE 255: Performed by: FAMILY MEDICINE

## 2019-03-14 PROCEDURE — 74177 CT ABD & PELVIS W/CONTRAST: CPT | Mod: TC

## 2019-03-14 PROCEDURE — 74177 CT ABD & PELVIS W/CONTRAST: CPT | Mod: 26,,, | Performed by: RADIOLOGY

## 2019-03-14 RX ADMIN — IOHEXOL 30 ML: 350 INJECTION, SOLUTION INTRAVENOUS at 08:03

## 2019-03-14 RX ADMIN — IOHEXOL 75 ML: 350 INJECTION, SOLUTION INTRAVENOUS at 09:03

## 2019-03-14 NOTE — TELEPHONE ENCOUNTER
Called patient and discussed labs and or test results. Patient expressed understanding and had the opportunity to ask questions. Any questions were answered. See meds, orders, follow up and instructions sections of encounter.    Specific issues include:CT scan was negative for diverticulitis, patient's symptoms have resolved as of today.  I discussed his laboratory.  He has a slightly low blood count but he gave blood last week as he does every 8 weeks and we have noted this before.  Asked him to keep his follow-up appointment in April and we will check a stool card

## 2019-03-15 LAB — BACTERIA UR CULT: NO GROWTH

## 2019-03-15 NOTE — TELEPHONE ENCOUNTER
Spoke with pt, pt states that he just spent lots of money on CT scan yesterday, he refused appt and also canceled

## 2019-03-21 RX ORDER — LOSARTAN POTASSIUM 50 MG/1
TABLET ORAL
Qty: 90 TABLET | Refills: 0 | Status: SHIPPED | OUTPATIENT
Start: 2019-03-21 | End: 2019-06-14 | Stop reason: SDUPTHER

## 2019-03-22 DIAGNOSIS — Z00.00 ROUTINE GENERAL MEDICAL EXAMINATION AT A HEALTH CARE FACILITY: Primary | ICD-10-CM

## 2019-04-01 ENCOUNTER — OFFICE VISIT (OUTPATIENT)
Dept: INTERNAL MEDICINE | Facility: CLINIC | Age: 54
End: 2019-04-01
Payer: COMMERCIAL

## 2019-04-01 VITALS
HEIGHT: 64 IN | BODY MASS INDEX: 30.6 KG/M2 | HEART RATE: 77 BPM | OXYGEN SATURATION: 97 % | SYSTOLIC BLOOD PRESSURE: 122 MMHG | DIASTOLIC BLOOD PRESSURE: 88 MMHG | WEIGHT: 179.25 LBS

## 2019-04-01 DIAGNOSIS — M54.50 ACUTE LEFT-SIDED LOW BACK PAIN WITHOUT SCIATICA: Primary | ICD-10-CM

## 2019-04-01 DIAGNOSIS — M79.18 LUMBAR MUSCLE PAIN: ICD-10-CM

## 2019-04-01 PROCEDURE — 99213 PR OFFICE/OUTPT VISIT, EST, LEVL III, 20-29 MIN: ICD-10-PCS | Mod: S$GLB,,, | Performed by: INTERNAL MEDICINE

## 2019-04-01 PROCEDURE — 99999 PR PBB SHADOW E&M-EST. PATIENT-LVL III: CPT | Mod: PBBFAC,,, | Performed by: INTERNAL MEDICINE

## 2019-04-01 PROCEDURE — 99213 OFFICE O/P EST LOW 20 MIN: CPT | Mod: S$GLB,,, | Performed by: INTERNAL MEDICINE

## 2019-04-01 PROCEDURE — 99999 PR PBB SHADOW E&M-EST. PATIENT-LVL III: ICD-10-PCS | Mod: PBBFAC,,, | Performed by: INTERNAL MEDICINE

## 2019-04-01 RX ORDER — CYCLOBENZAPRINE HCL 10 MG
10 TABLET ORAL 3 TIMES DAILY PRN
Qty: 30 TABLET | Refills: 1 | Status: SHIPPED | OUTPATIENT
Start: 2019-04-01 | End: 2019-04-24 | Stop reason: SDUPTHER

## 2019-04-01 NOTE — PROGRESS NOTES
Subjective:       Patient ID: Jerome Almazan is a 53 y.o. male.    Chief Complaint: Low-back Pain    HPI patient comes in urgent care left low back pain. He states he has been having this nearly 2 months, thought it was getting better up again Friday.  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  He says he cannot do so at this.  He denies any new injury.  Went over the CT scan that his PCP did few weeks ago in detail.  I showed him that it did see the spine.  He says the abdominal pain has improved.  Labs were stable.  Pain does not radiate but he feels it in the left low back any says it feels like a spasm.  He took 2 ibuprofen a few days ago and it helped tremendously but he did not take it again.  He has otherwise not been taking any medication or using any heat.  Explained certainly through some heat and conservative treatment we may get some benefit.  The fact that it does not radiate down his leg is a good sign.  No rash.  No burning with urination.    Review of Systems   Constitutional: Negative for chills, fatigue, fever and unexpected weight change.   HENT: Negative for nosebleeds and trouble swallowing.    Eyes: Negative for pain and visual disturbance.   Respiratory: Negative for cough, shortness of breath and wheezing.    Cardiovascular: Negative for chest pain and palpitations.   Gastrointestinal: Negative for abdominal pain, constipation, diarrhea, nausea and vomiting.   Genitourinary: Negative for difficulty urinating and hematuria.   Musculoskeletal: Positive for back pain, gait problem and myalgias. Negative for neck pain.   Skin: Negative for rash.   Neurological: Negative for dizziness and headaches.   Hematological: Does not bruise/bleed easily.   Psychiatric/Behavioral: Negative for dysphoric mood, sleep disturbance and suicidal ideas.       Objective:      Physical Exam   Constitutional: He is oriented to person, place, and time. He appears well-developed and  well-nourished.   HENT:   Head: Normocephalic and atraumatic.   Cardiovascular: Normal rate.   Pulmonary/Chest: Effort normal and breath sounds normal. No respiratory distress. He exhibits no tenderness.   Abdominal: Soft. Bowel sounds are normal. He exhibits no distension. There is no tenderness.   Musculoskeletal: He exhibits tenderness.        Arms:  Negative straight leg raise   Neurological: He is alert and oriented to person, place, and time. He displays normal reflexes. No cranial nerve deficit. Coordination normal.   Skin: Skin is warm and dry. Capillary refill takes less than 2 seconds. No rash noted. No erythema.   Psychiatric: He has a normal mood and affect. His behavior is normal.       Assessment:       1. Acute left-sided low back pain without sciatica    2. Lumbar muscle pain        Plan:       Jerome was seen today for low-back pain.    Diagnoses and all orders for this visit:    Acute left-sided low back pain without sciatica    Lumbar muscle pain    Other orders  -     cyclobenzaprine (FLEXERIL) 10 MG tablet; Take 1 tablet (10 mg total) by mouth 3 (three) times daily as needed for Muscle spasms.        rest, heat, gentle stretching.  Note to be off work 3 days.  He may return Thursday.  Side effects regarding muscle relaxer described and he should only take it at night if working or driving during the day  Heat and ibuprofen

## 2019-04-01 NOTE — LETTER
April 1, 2019      [facility]  [address]  [facility] [address]  Phone: [phone]  Fax: [phone]       Patient: Jerome Almazan   YOB: 1965  Date of Visit: 04/01/2019    To Whom It May Concern:    Bulmaro Almazan  was at [facility] on 04/01/2019. He may return to work/school on 4/4/19 with no restrictions. If you have any questions or concerns, or if I can be of further assistance, please do not hesitate to contact me.    Sincerely,       Black Gutierrez MD

## 2019-04-03 ENCOUNTER — OFFICE VISIT (OUTPATIENT)
Dept: INTERNAL MEDICINE | Facility: CLINIC | Age: 54
End: 2019-04-03
Payer: COMMERCIAL

## 2019-04-03 VITALS
HEART RATE: 82 BPM | BODY MASS INDEX: 31.33 KG/M2 | DIASTOLIC BLOOD PRESSURE: 86 MMHG | WEIGHT: 182.56 LBS | SYSTOLIC BLOOD PRESSURE: 130 MMHG | OXYGEN SATURATION: 99 %

## 2019-04-03 DIAGNOSIS — M54.50 ACUTE LEFT-SIDED LOW BACK PAIN WITHOUT SCIATICA: Primary | ICD-10-CM

## 2019-04-03 PROCEDURE — 99213 PR OFFICE/OUTPT VISIT, EST, LEVL III, 20-29 MIN: ICD-10-PCS | Mod: S$GLB,,, | Performed by: PHYSICIAN ASSISTANT

## 2019-04-03 PROCEDURE — 99213 OFFICE O/P EST LOW 20 MIN: CPT | Mod: S$GLB,,, | Performed by: PHYSICIAN ASSISTANT

## 2019-04-03 PROCEDURE — 99999 PR PBB SHADOW E&M-EST. PATIENT-LVL III: ICD-10-PCS | Mod: PBBFAC,,, | Performed by: PHYSICIAN ASSISTANT

## 2019-04-03 PROCEDURE — 99999 PR PBB SHADOW E&M-EST. PATIENT-LVL III: CPT | Mod: PBBFAC,,, | Performed by: PHYSICIAN ASSISTANT

## 2019-04-03 NOTE — PROGRESS NOTES
"Subjective:       Patient ID: Jerome Almazan is a 53 y.o. male.    Chief Complaint: Follow-up    HPI     Established pt of Broderick Staples MD (new to me)    Here to follow up low back pain.     Today pt states he is "feeling great". He notes significant improvement of back pain with Flexeril and ibuprofen. Spasms have resolved. He feels ready to return to work. In need of work release form to be completed.     Review of patient's allergies indicates:  No Known Allergies    Past Medical History:   Diagnosis Date    Hypertension      Social History     Tobacco Use    Smoking status: Never Smoker    Smokeless tobacco: Never Used   Substance Use Topics    Alcohol use: Yes     Comment: occasional    Drug use: No         Review of Systems   Constitutional: Negative for chills, fever and unexpected weight change.   Respiratory: Negative for cough and shortness of breath.    Cardiovascular: Negative for chest pain and leg swelling.   Gastrointestinal: Negative for abdominal pain, nausea and vomiting.   Musculoskeletal: Positive for back pain (improved).   Skin: Negative for rash.   Neurological: Negative for weakness.       Objective: /86   Pulse 82   Wt 82.8 kg (182 lb 8.7 oz)   SpO2 99%   BMI 31.33 kg/m²         Physical Exam   Constitutional: He appears well-developed and well-nourished. No distress.   HENT:   Head: Normocephalic and atraumatic.   Cardiovascular: Normal rate and regular rhythm. Exam reveals no friction rub.   No murmur heard.  Pulmonary/Chest: Effort normal and breath sounds normal. He has no wheezes. He has no rales.   Musculoskeletal:        Lumbar back: He exhibits normal range of motion, no tenderness, no bony tenderness, no pain and no spasm.   Ambulating without difficulty   Neurological: He is alert.   Skin: Skin is warm and dry. No rash noted.   Vitals reviewed.      Assessment:       1. Acute left-sided low back pain without sciatica        Plan:         Jerome was " seen today for follow-up.    Diagnoses and all orders for this visit:    Acute left-sided low back pain without sciatica    Back pain now resolved  Pt ready to return to work  Paperwork and return work note completed    Rehana Zapata, PAC

## 2019-04-12 ENCOUNTER — OFFICE VISIT (OUTPATIENT)
Dept: INTERNAL MEDICINE | Facility: CLINIC | Age: 54
End: 2019-04-12
Payer: COMMERCIAL

## 2019-04-12 ENCOUNTER — HOSPITAL ENCOUNTER (OUTPATIENT)
Dept: CARDIOLOGY | Facility: CLINIC | Age: 54
Discharge: HOME | End: 2019-04-12
Payer: COMMERCIAL

## 2019-04-12 ENCOUNTER — CLINICAL SUPPORT (OUTPATIENT)
Dept: INTERNAL MEDICINE | Facility: CLINIC | Age: 54
End: 2019-04-12
Payer: COMMERCIAL

## 2019-04-12 VITALS
BODY MASS INDEX: 30.12 KG/M2 | WEIGHT: 175.5 LBS | HEART RATE: 72 BPM | DIASTOLIC BLOOD PRESSURE: 88 MMHG | SYSTOLIC BLOOD PRESSURE: 124 MMHG

## 2019-04-12 DIAGNOSIS — Z00.00 ROUTINE GENERAL MEDICAL EXAMINATION AT A HEALTH CARE FACILITY: ICD-10-CM

## 2019-04-12 DIAGNOSIS — S39.012S STRAIN OF LUMBAR REGION, SEQUELA: ICD-10-CM

## 2019-04-12 DIAGNOSIS — Z00.00 ROUTINE GENERAL MEDICAL EXAMINATION AT A HEALTH CARE FACILITY: Primary | ICD-10-CM

## 2019-04-12 LAB
ALBUMIN SERPL BCP-MCNC: 4.1 G/DL (ref 3.5–5.2)
ALP SERPL-CCNC: 51 U/L (ref 55–135)
ALT SERPL W/O P-5'-P-CCNC: 25 U/L (ref 10–44)
ANION GAP SERPL CALC-SCNC: 5 MMOL/L (ref 8–16)
AST SERPL-CCNC: 22 U/L (ref 10–40)
BILIRUB SERPL-MCNC: 0.5 MG/DL (ref 0.1–1)
BUN SERPL-MCNC: 24 MG/DL (ref 6–20)
CALCIUM SERPL-MCNC: 9.8 MG/DL (ref 8.7–10.5)
CHLORIDE SERPL-SCNC: 106 MMOL/L (ref 95–110)
CHOLEST SERPL-MCNC: 146 MG/DL (ref 120–199)
CHOLEST/HDLC SERPL: 3.7 {RATIO} (ref 2–5)
CO2 SERPL-SCNC: 28 MMOL/L (ref 23–29)
COMPLEXED PSA SERPL-MCNC: 3 NG/ML (ref 0–4)
CREAT SERPL-MCNC: 1.2 MG/DL (ref 0.5–1.4)
ERYTHROCYTE [DISTWIDTH] IN BLOOD BY AUTOMATED COUNT: 13.2 % (ref 11.5–14.5)
EST. GFR  (AFRICAN AMERICAN): >60 ML/MIN/1.73 M^2
EST. GFR  (NON AFRICAN AMERICAN): >60 ML/MIN/1.73 M^2
GLUCOSE SERPL-MCNC: 90 MG/DL (ref 70–110)
HCT VFR BLD AUTO: 40.4 % (ref 40–54)
HDLC SERPL-MCNC: 39 MG/DL (ref 40–75)
HDLC SERPL: 26.7 % (ref 20–50)
HGB BLD-MCNC: 12.9 G/DL (ref 14–18)
LDLC SERPL CALC-MCNC: 96.2 MG/DL (ref 63–159)
MCH RBC QN AUTO: 28.4 PG (ref 27–31)
MCHC RBC AUTO-ENTMCNC: 31.9 G/DL (ref 32–36)
MCV RBC AUTO: 89 FL (ref 82–98)
NONHDLC SERPL-MCNC: 107 MG/DL
PLATELET # BLD AUTO: 274 K/UL (ref 150–350)
PMV BLD AUTO: 9.9 FL (ref 9.2–12.9)
POTASSIUM SERPL-SCNC: 4.4 MMOL/L (ref 3.5–5.1)
PROT SERPL-MCNC: 7.4 G/DL (ref 6–8.4)
RBC # BLD AUTO: 4.55 M/UL (ref 4.6–6.2)
SODIUM SERPL-SCNC: 139 MMOL/L (ref 136–145)
TRIGL SERPL-MCNC: 54 MG/DL (ref 30–150)
WBC # BLD AUTO: 5.29 K/UL (ref 3.9–12.7)

## 2019-04-12 PROCEDURE — 84153 ASSAY OF PSA TOTAL: CPT

## 2019-04-12 PROCEDURE — 80053 COMPREHEN METABOLIC PANEL: CPT

## 2019-04-12 PROCEDURE — 99396 PR PREVENTIVE VISIT,EST,40-64: ICD-10-PCS | Mod: S$PBB,,, | Performed by: INTERNAL MEDICINE

## 2019-04-12 PROCEDURE — 99999 PR PBB SHADOW E&M-EST. PATIENT-LVL III: CPT | Mod: PBBFAC,,, | Performed by: INTERNAL MEDICINE

## 2019-04-12 PROCEDURE — 93010 ELECTROCARDIOGRAM REPORT: CPT | Mod: S$PBB,,, | Performed by: INTERNAL MEDICINE

## 2019-04-12 PROCEDURE — 99396 PREV VISIT EST AGE 40-64: CPT | Mod: S$PBB,,, | Performed by: INTERNAL MEDICINE

## 2019-04-12 PROCEDURE — 80061 LIPID PANEL: CPT

## 2019-04-12 PROCEDURE — 93005 ELECTROCARDIOGRAM TRACING: CPT | Mod: PBBFAC | Performed by: INTERNAL MEDICINE

## 2019-04-12 PROCEDURE — 93010 EKG 12-LEAD: ICD-10-PCS | Mod: S$PBB,,, | Performed by: INTERNAL MEDICINE

## 2019-04-12 PROCEDURE — 99999 PR PBB SHADOW E&M-EST. PATIENT-LVL III: ICD-10-PCS | Mod: PBBFAC,,, | Performed by: INTERNAL MEDICINE

## 2019-04-12 PROCEDURE — 85027 COMPLETE CBC AUTOMATED: CPT

## 2019-04-12 RX ORDER — GABAPENTIN 100 MG/1
CAPSULE ORAL
Qty: 30 CAPSULE | Refills: 2 | Status: SHIPPED | OUTPATIENT
Start: 2019-04-12 | End: 2019-07-07 | Stop reason: SDUPTHER

## 2019-04-24 RX ORDER — CYCLOBENZAPRINE HCL 10 MG
TABLET ORAL
Qty: 30 TABLET | Refills: 0 | Status: SHIPPED | OUTPATIENT
Start: 2019-04-24 | End: 2020-07-25 | Stop reason: CLARIF

## 2019-04-24 NOTE — TELEPHONE ENCOUNTER
----- Message from Landy Martinez sent at 4/24/2019 10:06 AM CDT -----  Contact: [phone]  Patient is calling for an RX refill or new RX.  Is this a refill or new RX:  Refill   RX name and strength: cyclobenzaprine (FLEXERIL) 10 MG tablet    Local pharmacy or mail order pharmacy:  [facility] [address] [place] [address] [address] AT [facility] [address]   [phone] (Phone)  [phone] (Fax    Comments:  Please advise, thanks

## 2019-04-25 RX ORDER — CYCLOBENZAPRINE HCL 10 MG
10 TABLET ORAL 3 TIMES DAILY PRN
Qty: 30 TABLET | Refills: 0 | Status: SHIPPED | OUTPATIENT
Start: 2019-04-25 | End: 2019-05-25

## 2019-06-14 RX ORDER — LOSARTAN POTASSIUM 50 MG/1
TABLET ORAL
Qty: 90 TABLET | Refills: 0 | Status: SHIPPED | OUTPATIENT
Start: 2019-06-14 | End: 2020-03-20

## 2019-07-07 DIAGNOSIS — S39.012S STRAIN OF LUMBAR REGION, SEQUELA: ICD-10-CM

## 2019-07-08 RX ORDER — GABAPENTIN 100 MG/1
CAPSULE ORAL
Qty: 30 CAPSULE | Refills: 0 | Status: SHIPPED | OUTPATIENT
Start: 2019-07-08 | End: 2019-08-09 | Stop reason: SDUPTHER

## 2019-08-09 DIAGNOSIS — S39.012S STRAIN OF LUMBAR REGION, SEQUELA: ICD-10-CM

## 2019-08-09 RX ORDER — GABAPENTIN 100 MG/1
CAPSULE ORAL
Qty: 30 CAPSULE | Refills: 1 | Status: SHIPPED | OUTPATIENT
Start: 2019-08-09 | End: 2020-07-25 | Stop reason: CLARIF

## 2020-03-20 DIAGNOSIS — I10 HYPERTENSION, UNSPECIFIED TYPE: Primary | ICD-10-CM

## 2020-03-20 RX ORDER — LOSARTAN POTASSIUM 50 MG/1
TABLET ORAL
Qty: 90 TABLET | Refills: 1 | Status: SHIPPED | OUTPATIENT
Start: 2020-03-20 | End: 2020-08-17 | Stop reason: DRUGHIGH

## 2020-07-21 DIAGNOSIS — Z03.818 ENCOUNTER FOR OBSERVATION FOR SUSPECTED EXPOSURE TO OTHER BIOLOGICAL AGENTS RULED OUT: ICD-10-CM

## 2020-07-22 ENCOUNTER — LAB VISIT (OUTPATIENT)
Dept: INTERNAL MEDICINE | Facility: CLINIC | Age: 55
End: 2020-07-22
Payer: COMMERCIAL

## 2020-07-22 DIAGNOSIS — Z03.818 ENCOUNTER FOR OBSERVATION FOR SUSPECTED EXPOSURE TO OTHER BIOLOGICAL AGENTS RULED OUT: ICD-10-CM

## 2020-07-22 PROCEDURE — U0003 INFECTIOUS AGENT DETECTION BY NUCLEIC ACID (DNA OR RNA); SEVERE ACUTE RESPIRATORY SYNDROME CORONAVIRUS 2 (SARS-COV-2) (CORONAVIRUS DISEASE [COVID-19]), AMPLIFIED PROBE TECHNIQUE, MAKING USE OF HIGH THROUGHPUT TECHNOLOGIES AS DESCRIBED BY CMS-2020-01-R: HCPCS

## 2020-07-23 LAB — SARS-COV-2 RNA RESP QL NAA+PROBE: NOT DETECTED

## 2020-07-25 ENCOUNTER — HOSPITAL ENCOUNTER (EMERGENCY)
Facility: HOSPITAL | Age: 55
Discharge: HOME | End: 2020-07-25
Attending: EMERGENCY MEDICINE
Payer: COMMERCIAL

## 2020-07-25 VITALS
OXYGEN SATURATION: 96 % | HEIGHT: 64 IN | SYSTOLIC BLOOD PRESSURE: 177 MMHG | DIASTOLIC BLOOD PRESSURE: 96 MMHG | RESPIRATION RATE: 15 BRPM | BODY MASS INDEX: 31.76 KG/M2 | TEMPERATURE: 98 F | HEART RATE: 65 BPM | WEIGHT: 186 LBS

## 2020-07-25 DIAGNOSIS — R07.9 CHEST PAIN: ICD-10-CM

## 2020-07-25 LAB
ALBUMIN SERPL BCP-MCNC: 4.1 G/DL (ref 3.5–5.2)
ALP SERPL-CCNC: 51 U/L (ref 55–135)
ALT SERPL W/O P-5'-P-CCNC: 36 U/L (ref 10–44)
ANION GAP SERPL CALC-SCNC: 8 MMOL/L (ref 8–16)
AST SERPL-CCNC: 23 U/L (ref 10–40)
BASOPHILS # BLD AUTO: 0.01 K/UL (ref 0–0.2)
BASOPHILS NFR BLD: 0.2 % (ref 0–1.9)
BILIRUB SERPL-MCNC: 0.4 MG/DL (ref 0.1–1)
BNP SERPL-MCNC: <10 PG/ML (ref 0–99)
BUN SERPL-MCNC: 18 MG/DL (ref 6–20)
CALCIUM SERPL-MCNC: 9.1 MG/DL (ref 8.7–10.5)
CHLORIDE SERPL-SCNC: 106 MMOL/L (ref 95–110)
CO2 SERPL-SCNC: 24 MMOL/L (ref 23–29)
CREAT SERPL-MCNC: 1.1 MG/DL (ref 0.5–1.4)
DIFFERENTIAL METHOD: ABNORMAL
EOSINOPHIL # BLD AUTO: 0.3 K/UL (ref 0–0.5)
EOSINOPHIL NFR BLD: 5.2 % (ref 0–8)
ERYTHROCYTE [DISTWIDTH] IN BLOOD BY AUTOMATED COUNT: 12.7 % (ref 11.5–14.5)
EST. GFR  (AFRICAN AMERICAN): >60 ML/MIN/1.73 M^2
EST. GFR  (NON AFRICAN AMERICAN): >60 ML/MIN/1.73 M^2
GLUCOSE SERPL-MCNC: 96 MG/DL (ref 70–110)
HCT VFR BLD AUTO: 41.1 % (ref 40–54)
HGB BLD-MCNC: 14 G/DL (ref 14–18)
IMM GRANULOCYTES # BLD AUTO: 0.07 K/UL (ref 0–0.04)
IMM GRANULOCYTES NFR BLD AUTO: 1.1 % (ref 0–0.5)
LYMPHOCYTES # BLD AUTO: 2.2 K/UL (ref 1–4.8)
LYMPHOCYTES NFR BLD: 35.4 % (ref 18–48)
MCH RBC QN AUTO: 30 PG (ref 27–31)
MCHC RBC AUTO-ENTMCNC: 34.1 G/DL (ref 32–36)
MCV RBC AUTO: 88 FL (ref 82–98)
MONOCYTES # BLD AUTO: 0.8 K/UL (ref 0.3–1)
MONOCYTES NFR BLD: 13.3 % (ref 4–15)
NEUTROPHILS # BLD AUTO: 2.8 K/UL (ref 1.8–7.7)
NEUTROPHILS NFR BLD: 44.8 % (ref 38–73)
NRBC BLD-RTO: 0 /100 WBC
PLATELET # BLD AUTO: 245 K/UL (ref 150–350)
PMV BLD AUTO: 9.6 FL (ref 9.2–12.9)
POTASSIUM SERPL-SCNC: 4.3 MMOL/L (ref 3.5–5.1)
PROT SERPL-MCNC: 7.2 G/DL (ref 6–8.4)
RBC # BLD AUTO: 4.67 M/UL (ref 4.6–6.2)
SODIUM SERPL-SCNC: 138 MMOL/L (ref 136–145)
TROPONIN I SERPL DL<=0.01 NG/ML-MCNC: <0.006 NG/ML (ref 0–0.03)
TROPONIN I SERPL DL<=0.01 NG/ML-MCNC: <0.006 NG/ML (ref 0–0.03)
WBC # BLD AUTO: 6.15 K/UL (ref 3.9–12.7)

## 2020-07-25 PROCEDURE — 83880 ASSAY OF NATRIURETIC PEPTIDE: CPT

## 2020-07-25 PROCEDURE — 85025 COMPLETE CBC W/AUTO DIFF WBC: CPT

## 2020-07-25 PROCEDURE — 93005 ELECTROCARDIOGRAM TRACING: CPT

## 2020-07-25 PROCEDURE — 80053 COMPREHEN METABOLIC PANEL: CPT

## 2020-07-25 PROCEDURE — 99285 EMERGENCY DEPT VISIT HI MDM: CPT | Mod: 25

## 2020-07-25 PROCEDURE — 84484 ASSAY OF TROPONIN QUANT: CPT | Mod: 91

## 2020-07-25 RX ORDER — NAPROXEN 500 MG/1
500 TABLET ORAL 2 TIMES DAILY WITH MEALS
Qty: 60 TABLET | Refills: 0 | Status: SHIPPED | OUTPATIENT
Start: 2020-07-25 | End: 2021-04-23 | Stop reason: ALTCHOICE

## 2020-07-25 RX ORDER — KETOROLAC TROMETHAMINE 30 MG/ML
15 INJECTION, SOLUTION INTRAMUSCULAR; INTRAVENOUS
Status: DISCONTINUED | OUTPATIENT
Start: 2020-07-25 | End: 2020-07-25 | Stop reason: HOSPADM

## 2020-07-25 RX ORDER — CYCLOBENZAPRINE HCL 10 MG
10 TABLET ORAL 3 TIMES DAILY PRN
Qty: 15 TABLET | Refills: 0 | Status: SHIPPED | OUTPATIENT
Start: 2020-07-25 | End: 2020-07-30

## 2020-07-25 RX ORDER — ASPIRIN 325 MG
325 TABLET ORAL
Status: DISCONTINUED | OUTPATIENT
Start: 2020-07-25 | End: 2020-07-25 | Stop reason: HOSPADM

## 2020-07-25 RX ORDER — METHOCARBAMOL 500 MG/1
1000 TABLET, FILM COATED ORAL
Status: DISCONTINUED | OUTPATIENT
Start: 2020-07-25 | End: 2020-07-25 | Stop reason: HOSPADM

## 2020-07-25 NOTE — ED PROVIDER NOTES
Encounter Date: 2020    COVID Statement  The  of Health and Human Services and Polo Alfaro, Governor of the State [facility], have declared a State of Public Health Emergency due to the spread of a novel coronavirus and disease (COVID-19). There is no currently accepted treatment except conservative measures and respiratory support if appropriate.  This has lead to significant resource capacity and potential delays in care.       SCRIBE #1 NOTE: I, Eva Ragland, am scribing for, and in the presence of,  Dr. Moser . I have scribed the entire note.       History     Chief Complaint   Patient presents with    Chest Pain     rt sided chest pain with numbness to rt leg since monday, denies radiation, sob no neck or jaw pain or n/v     55 y/o male with a past medical history of hypertension presents to the Emergency Department with six days of intermittent right sided chest pain. Pt reports the sudden onset of sharp right sided CP on Monday morning while eating an egg sandwich. He states symptoms lasted several seconds before subsiding. Since that time, the patient has had a constant muscle ache to the right chest with intermittent SOB. No aggravating or alleviating factors. Pt reports a previous history of similar symptoms 5 years ago, at which time a stress test was done, which was normal. Pt also mentions an elevated BP of 182/101 this morning, which concerned him. He also mentions that for a few minutes he had some decreased sensation to left calf.  This has since resolved.  No HA, vision changes or dizziness. Pt reports compliance with his losartan prescription. Pt mentions his Mother (age 60) and Father (age 73)  of a MI. Of note, pt recently tested negative for covid. He is a nonsmoker.    The history is provided by the patient.     Review of patient's allergies indicates:  No Known Allergies  Past Medical History:   Diagnosis Date    Hypertension      Past Surgical History:    Procedure Laterality Date    CARPAL TUNNEL RELEASE      SHOULDER SURGERY       Family History   Problem Relation Age of Onset    Heart disease Mother     Heart disease Father     Diabetes Brother      Social History     Tobacco Use    Smoking status: Never Smoker    Smokeless tobacco: Never Used   Substance Use Topics    Alcohol use: Yes     Comment: occasional    Drug use: No     Review of Systems   Constitutional: Negative for chills and fever.   HENT: Negative for congestion, ear pain, rhinorrhea and sore throat.    Respiratory: Positive for shortness of breath. Negative for cough and wheezing.    Cardiovascular: Positive for chest pain. Negative for palpitations.   Gastrointestinal: Negative for abdominal pain, diarrhea, nausea and vomiting.   Genitourinary: Negative for dysuria and hematuria.   Musculoskeletal: Negative for back pain, myalgias and neck pain.   Skin: Negative for rash.   Neurological: Positive for numbness. Negative for dizziness, weakness, light-headedness and headaches.   Psychiatric/Behavioral: Negative for confusion.       Physical Exam     Initial Vitals [07/25/20 1018]   BP Pulse Resp Temp SpO2   (!) 155/86 69 18 97.4 °F (36.3 °C) 98 %      MAP       --         Physical Exam    Nursing note and vitals reviewed.  Constitutional: He appears well-developed and well-nourished. He is not diaphoretic. No distress.   HENT:   Head: Normocephalic and atraumatic.   Right Ear: External ear normal.   Left Ear: External ear normal.   Eyes: EOM are normal.   Cardiovascular: Normal rate, regular rhythm, normal heart sounds and intact distal pulses. Exam reveals no gallop and no friction rub.    No murmur heard.  2+ radial pulses bilaterally   Pulmonary/Chest: Breath sounds normal. No respiratory distress. He has no wheezes. He has no rales. He exhibits tenderness.   Mild chest wall tenderness on the right      Abdominal: Soft. He exhibits no distension. There is no abdominal tenderness. There  is no rebound and no guarding.   Musculoskeletal: Normal range of motion. No tenderness or edema.   Neurological: He is alert and oriented to person, place, and time. No sensory deficit. GCS score is 15. GCS eye subscore is 4. GCS verbal subscore is 5. GCS motor subscore is 6.   Skin: Skin is warm and dry. Capillary refill takes less than 2 seconds.   Psychiatric: He has a normal mood and affect.         ED Course   Procedures  Labs Reviewed   CBC W/ AUTO DIFFERENTIAL - Abnormal; Notable for the following components:       Result Value    Immature Granulocytes 1.1 (*)     Immature Grans (Abs) 0.07 (*)     All other components within normal limits   COMPREHENSIVE METABOLIC PANEL - Abnormal; Notable for the following components:    Alkaline Phosphatase 51 (*)     All other components within normal limits   TROPONIN I   TROPONIN I   B-TYPE NATRIURETIC PEPTIDE     EKG Readings: (Independently Interpreted)   NSR, rate of 71 bpm.  Normal axis, normal intervals, normal conduction, no STEMI       Imaging Results          X-Ray Chest PA And Lateral (Final result)  Result time 07/25/20 11:31:32    Final result by Sukumar Lozano MD (07/25/20 11:31:32)                 Impression:      No acute abnormality.      Electronically signed by: Sukumar Lozano MD  Date:    07/25/2020  Time:    11:31             Narrative:    EXAMINATION:  XR CHEST PA AND LATERAL    CLINICAL HISTORY:  Chest Pain;    TECHNIQUE:  PA and lateral views of the chest were performed.    COMPARISON:  02/17/2017    FINDINGS:  The lungs are clear, with normal appearance of pulmonary vasculature.No pleural effusion.No pneumothorax.    The cardiac silhouette is normal in size. The hilar and mediastinal contours are unremarkable.    Bones are intact.                                 Medical Decision Making:   Initial Assessment:   Patient here with CP  Differential Diagnosis:   Myocardial ischemia, pericarditis, pulmonary embolus, chest wall pain, pleural  inflammation, pulmonary infectious causes, aortic dissection.     Independently Interpreted Test(s):   I have ordered and independently interpreted X-rays - see prior notes.  I have ordered and independently interpreted EKG Reading(s) - see prior notes  Clinical Tests:   Lab Tests: Ordered and Reviewed  Radiological Study: Ordered and Reviewed  Medical Tests: Ordered and Reviewed  ED Management:    On re-evaluation, the patient's status has improved.  After complete ED evaluation, clinical impression is most consistent with chest pain.  - EKG without acute ischemia  - troponin negative x 2.  - CXR clear  Low suspicion for ACS, as patient has no ischemia on EKG and troponin negative x 2.   HEART score of only 2  Low suspicion for PE.  Low risk according to luz  PCP follow-up within 2-3 days was recommended.    After taking into careful account the patient's history, physical exam findings, as well as empirical and objective data obtained throughout ED workup, I feel no emergent medical condition has been identified. No further evaluation or admission was felt to be required, and the patient is stable for discharge from the ED. The patient and any additional family present were updated with test results, overall clinical impression, and recommended further plan of care, including discharge instructions as provided and outpatient follow-up for continued evaluation and management as needed. All questions were answered. The patient expressed understanding and agreed with current plan for discharge and follow-up plan of care. Strict ED return precautions were provided, including return/worsening of current symptoms, new symptoms, or any other concerns.      Additional MDM:     Well's Criteria Score:  -Clinical symptoms of DVT (leg swelling, pain with palpation) = 0.0  -Other diagnosis less likely than pulmonary embolism =            0.0  -Heart Rate >100 =   0.0  -Immobilization (= or > than 3 days) or surgery in the  previous 4 weeks = 0.0  -Previous DVT/PE = 0.0  -Hemoptysis =          0.0  -Malignancy =           0.0  Well's Probability Score =    0      Heart Score:    History:          Slightly suspicious.  ECG:             Normal  Age:               45-65 years  Risk factors: 1-2 risk factors  Troponin:       Less than or equal to normal limit  Final Score: 2                    ED Course as of Jul 25 1600   Sat Jul 25, 2020   1015 EKG with NSR, rate of 71 bpm.  Normal axis, normal intervals, normal conduction, no STEMI    [AJ]   1024 BP(!): 155/86 [LD]   1024 Temp: 97.4 °F (36.3 °C) [LD]   1024 Pulse: 69 [LD]   1024 Resp: 18 [LD]   1024 SpO2: 98 % [LD]   1211 Troponin I: <0.006 [LD]   1211 BNP: <10 [LD]   1545 Pain relieved.  Patient refused meds in ED.    [LD]      ED Course User Index  [AJ] Eva Obie  [LD] Cierra Moser MD                Clinical Impression:       ICD-10-CM ICD-9-CM   1. Chest pain  R07.9 786.50             ED Disposition Condition    Discharge Stable        ED Prescriptions     Medication Sig Dispense Start Date End Date Auth. Provider    naproxen (NAPROSYN) 500 MG tablet Take 1 tablet (500 mg total) by mouth 2 (two) times daily with meals. 60 tablet 7/25/2020  Cierra Moser MD    cyclobenzaprine (FLEXERIL) 10 MG tablet Take 1 tablet (10 mg total) by mouth 3 (three) times daily as needed for Muscle spasms. 15 tablet 7/25/2020 7/30/2020 Cierra Moser MD        Follow-up Information     Follow up With Specialties Details Why Contact Info    Tirso Peñaloza MD Internal Medicine Go on 7/31/2020 as scheduled [address] [facility] [address]  [phone]                            I, Cierra Moser,  personally performed the services described in this documentation. All medical record entries made by the scribe were at my direction and in my presence.  I have reviewed the chart and agree that the record reflects my personal performance and is accurate and complete. Cierra  Ehsan PALMER 4:00 PM07/25/2020           Cierra Moser MD  07/25/20 1600

## 2020-07-25 NOTE — ED NOTES
Patient identifiers verified and correct for Jerome Almazan.    LOC: The patient is awake, alert and aware of environment with an appropriate affect, the patient is oriented x 3 and speaking appropriately.  APPEARANCE: Patient resting comfortably and in no acute distress, patient is clean and well groomed, patient's clothing is properly fastened.  SKIN: The skin is warm and dry, color consistent with ethnicity, patient has normal skin turgor and moist mucus membranes, skin intact, no breakdown or bruising noted.  MUSCULOSKELETAL: Patient moving all extremities spontaneously, no obvious swelling or deformities noted.  RESPIRATORY: Airway is open and patent, respirations are spontaneous, patient has a normal effort and rate, no accessory muscle use noted, bilateral breath sounds.  CARDIAC: SEE ASSESSMENT.  ABDOMEN: Soft and non tender to palpation, no distention noted, normoactive bowel sounds present in all four quadrants.  NEUROLOGIC: PERRL,eyes open spontaneously, behavior appropriate to situation, follows commands, facial expression symmetrical, bilateral hand grasp equal and even, purposeful motor response noted, normal sensation in all extremities when touched with a finger.

## 2020-07-25 NOTE — ED TRIAGE NOTES
Pt reports chest pain for the past few days. Pt states he did not worry about being checked initially, pt states when he started feeling numbness to his right leg as well. Pt denies SOB, dizziness. Pt in no acute distress.

## 2020-07-26 ENCOUNTER — TELEPHONE (OUTPATIENT)
Dept: INTERNAL MEDICINE | Facility: CLINIC | Age: 55
End: 2020-07-26

## 2020-07-26 NOTE — TELEPHONE ENCOUNTER
Patient see in ER with CP and HTN - not seen in some tome and due for annual - please call and see if he wishes to come in for follow up and annual exam. Thank you

## 2020-07-27 NOTE — TELEPHONE ENCOUNTER
LM for pt to return a call to us. Scheduled appointment for 07/30 @ 1040. Will keep calling to confirm if pt can keep this appointment.  Called for family and the phone is busy.

## 2020-07-31 ENCOUNTER — OFFICE VISIT (OUTPATIENT)
Dept: INTERNAL MEDICINE | Facility: CLINIC | Age: 55
End: 2020-07-31
Payer: COMMERCIAL

## 2020-07-31 DIAGNOSIS — Z00.00 ROUTINE GENERAL MEDICAL EXAMINATION AT A HEALTH CARE FACILITY: Primary | ICD-10-CM

## 2020-07-31 PROCEDURE — 99386 PREV VISIT NEW AGE 40-64: CPT | Mod: S$GLB,,, | Performed by: INTERNAL MEDICINE

## 2020-07-31 PROCEDURE — 99999 PR PBB SHADOW E&M-EST. PATIENT-LVL III: CPT | Mod: PBBFAC,,, | Performed by: INTERNAL MEDICINE

## 2020-07-31 PROCEDURE — 99386 PR PREVENTIVE VISIT,NEW,40-64: ICD-10-PCS | Mod: S$GLB,,, | Performed by: INTERNAL MEDICINE

## 2020-07-31 PROCEDURE — 99999 PR PBB SHADOW E&M-EST. PATIENT-LVL III: ICD-10-PCS | Mod: PBBFAC,,, | Performed by: INTERNAL MEDICINE

## 2020-08-05 VITALS
BODY MASS INDEX: 32.75 KG/M2 | WEIGHT: 190.81 LBS | HEART RATE: 72 BPM | SYSTOLIC BLOOD PRESSURE: 138 MMHG | DIASTOLIC BLOOD PRESSURE: 86 MMHG | TEMPERATURE: 99 F

## 2020-08-05 NOTE — PROGRESS NOTES
Subjective:       Patient ID: Jerome Almazan is a 54 y.o. male.    Chief Complaint: Executive Health    [facility]. Overall doing well, but he was seen in the ER last week for evaluation of chest pain that he experienced while at home. Cardiac etiology for his symptoms was ruled out and has had no problem since then. We discussed the results of these tests that included blood work as well as imaging studies and serial EKG's. All of these teste were unremarkable and his symptoms were likely muscular as he is very active physically, including strenuous weight training. I was not able to reproduce any discomfort/pain in his chest wall during my examination however. Nevertheless, cardiac was ruled out. He has history of hypertension and is on low dose Losartan. I recommended he increase Losartan to 100 mgs daily and monitor his blood pressures.  I am sending copies of his blood work including those studies obtained recently during the ER visit. I added lipids - normal - as well as PSA which was at 3.7. He has had issues with fluctuating PSA's in this range. He had prostate biopsy done in 2011 which showed no evidence of malignancy; nevertheless, he should follow up with his Urologist on a yearly basis. I will make arrangements for this at his convenience. Review of Systems   Constitutional: Negative for activity change, appetite change, chills, fatigue, fever and unexpected weight change.   Eyes: Negative for visual disturbance.   Respiratory: Negative for cough, shortness of breath and wheezing.    Cardiovascular: Negative for chest pain, palpitations and leg swelling.   Gastrointestinal: Negative for abdominal distention, abdominal pain and blood in stool.   Genitourinary: Negative for difficulty urinating.   Musculoskeletal: Negative for arthralgias, back pain and joint deformity.   Neurological: Negative for dizziness, weakness, light-headedness and headaches.    Hematological: Negative for adenopathy.         Objective:      Physical Exam  Vitals signs and nursing note reviewed.   Constitutional:       General: He is not in acute distress.     Appearance: Normal appearance. He is not ill-appearing.   HENT:      Head: Normocephalic and atraumatic.      Right Ear: Tympanic membrane, ear canal and external ear normal. There is no impacted cerumen.      Left Ear: Tympanic membrane, ear canal and external ear normal. There is no impacted cerumen.   Eyes:      General: No scleral icterus.        Right eye: No discharge.         Left eye: No discharge.      Extraocular Movements: Extraocular movements intact.      Conjunctiva/sclera: Conjunctivae normal.      Pupils: Pupils are equal, round, and reactive to light.   Neck:      Musculoskeletal: Normal range of motion and neck supple. No neck rigidity or muscular tenderness.      Vascular: No carotid bruit.   Cardiovascular:      Rate and Rhythm: Normal rate and regular rhythm.      Pulses: Normal pulses.      Heart sounds: Normal heart sounds. No murmur.   Pulmonary:      Effort: Pulmonary effort is normal. No respiratory distress.      Breath sounds: Normal breath sounds. No wheezing.   Chest:      Chest wall: No tenderness.   Abdominal:      General: Abdomen is flat. Bowel sounds are normal.      Palpations: Abdomen is soft. There is no mass.      Tenderness: There is no abdominal tenderness.   Musculoskeletal: Normal range of motion.         General: No swelling or tenderness.      Right lower leg: No edema.      Left lower leg: No edema.   Lymphadenopathy:      Cervical: No cervical adenopathy.   Skin:     General: Skin is warm and dry.      Findings: No erythema or rash.   Neurological:      General: No focal deficit present.      Mental Status: He is alert and oriented to person, place, and time.      Cranial Nerves: No cranial nerve deficit.      Motor: No weakness.      Coordination: Coordination normal.   Psychiatric:          Mood and Affect: Mood normal.         Behavior: Behavior normal.         Thought Content: Thought content normal.         Judgment: Judgment normal.         Assessment:       1. Routine general medical examination at a health care facility      2.    Hypertension.    3.    History of non-cardiac chest pain as per studies in ER.    4.    History of BPH with elevated PSA levels - negative biopsy 9/2011.  Plan:    1. Increase Losartan to 100 mgs daily.         2. Monitor blood pressure; keep diary as discussed.         3. Follow up with Urology.         4. Return to clinic in 1 year for annual exam or as needed.

## 2021-03-15 DIAGNOSIS — Z00.00 ROUTINE GENERAL MEDICAL EXAMINATION AT A HEALTH CARE FACILITY: Primary | ICD-10-CM

## 2021-04-21 DIAGNOSIS — Z00.00 ROUTINE GENERAL MEDICAL EXAMINATION AT A HEALTH CARE FACILITY: Primary | ICD-10-CM

## 2021-04-22 ENCOUNTER — TELEPHONE (OUTPATIENT)
Dept: INTERNAL MEDICINE | Facility: CLINIC | Age: 56
End: 2021-04-22

## 2021-04-23 ENCOUNTER — OFFICE VISIT (OUTPATIENT)
Dept: OTOLARYNGOLOGY | Facility: CLINIC | Age: 56
End: 2021-04-23
Payer: COMMERCIAL

## 2021-04-23 ENCOUNTER — OFFICE VISIT (OUTPATIENT)
Dept: INTERNAL MEDICINE | Facility: CLINIC | Age: 56
End: 2021-04-23
Payer: COMMERCIAL

## 2021-04-23 VITALS
DIASTOLIC BLOOD PRESSURE: 90 MMHG | SYSTOLIC BLOOD PRESSURE: 130 MMHG | HEIGHT: 64 IN | WEIGHT: 191.56 LBS | HEART RATE: 72 BPM | BODY MASS INDEX: 32.7 KG/M2

## 2021-04-23 VITALS — HEART RATE: 100 BPM | DIASTOLIC BLOOD PRESSURE: 91 MMHG | SYSTOLIC BLOOD PRESSURE: 147 MMHG

## 2021-04-23 DIAGNOSIS — J34.89 NASAL VALVE BLOCKAGE: Primary | ICD-10-CM

## 2021-04-23 DIAGNOSIS — J34.2 NASAL SEPTAL DEVIATION: ICD-10-CM

## 2021-04-23 DIAGNOSIS — I10 HYPERTENSION, ESSENTIAL: ICD-10-CM

## 2021-04-23 DIAGNOSIS — J34.3 HYPERTROPHY OF BOTH INFERIOR NASAL TURBINATES: ICD-10-CM

## 2021-04-23 DIAGNOSIS — J34.89 NASAL OBSTRUCTION: Primary | ICD-10-CM

## 2021-04-23 PROCEDURE — 99213 OFFICE O/P EST LOW 20 MIN: CPT | Mod: S$GLB,,, | Performed by: INTERNAL MEDICINE

## 2021-04-23 PROCEDURE — 99999 PR PBB SHADOW E&M-EST. PATIENT-LVL III: ICD-10-PCS | Mod: PBBFAC,,, | Performed by: OTOLARYNGOLOGY

## 2021-04-23 PROCEDURE — 99213 PR OFFICE/OUTPT VISIT, EST, LEVL III, 20-29 MIN: ICD-10-PCS | Mod: S$GLB,,, | Performed by: INTERNAL MEDICINE

## 2021-04-23 PROCEDURE — 99999 PR PBB SHADOW E&M-EST. PATIENT-LVL III: ICD-10-PCS | Mod: PBBFAC,,, | Performed by: INTERNAL MEDICINE

## 2021-04-23 PROCEDURE — 99243 PR OFFICE CONSULTATION,LEVEL III: ICD-10-PCS | Mod: S$GLB,,, | Performed by: OTOLARYNGOLOGY

## 2021-04-23 PROCEDURE — 99999 PR PBB SHADOW E&M-EST. PATIENT-LVL III: CPT | Mod: PBBFAC,,, | Performed by: INTERNAL MEDICINE

## 2021-04-23 PROCEDURE — 99999 PR PBB SHADOW E&M-EST. PATIENT-LVL III: CPT | Mod: PBBFAC,,, | Performed by: OTOLARYNGOLOGY

## 2021-04-23 PROCEDURE — 99243 OFF/OP CNSLTJ NEW/EST LOW 30: CPT | Mod: S$GLB,,, | Performed by: OTOLARYNGOLOGY

## 2021-04-23 RX ORDER — FLUTICASONE PROPIONATE 50 MCG
1 SPRAY, SUSPENSION (ML) NASAL 2 TIMES DAILY
Qty: 16 G | Refills: 11 | Status: SHIPPED | OUTPATIENT
Start: 2021-04-23 | End: 2022-04-08

## 2021-04-23 RX ORDER — HYDROCHLOROTHIAZIDE 12.5 MG/1
12.5 TABLET ORAL DAILY
Qty: 30 TABLET | Refills: 1 | Status: SHIPPED | OUTPATIENT
Start: 2021-04-23 | End: 2021-05-06 | Stop reason: SDUPTHER

## 2021-05-06 ENCOUNTER — TELEPHONE (OUTPATIENT)
Dept: UROLOGY | Facility: CLINIC | Age: 56
End: 2021-05-06

## 2021-05-06 ENCOUNTER — OFFICE VISIT (OUTPATIENT)
Dept: INTERNAL MEDICINE | Facility: CLINIC | Age: 56
End: 2021-05-06
Attending: FAMILY MEDICINE
Payer: COMMERCIAL

## 2021-05-06 ENCOUNTER — CLINICAL SUPPORT (OUTPATIENT)
Dept: INTERNAL MEDICINE | Facility: CLINIC | Age: 56
End: 2021-05-06
Payer: COMMERCIAL

## 2021-05-06 ENCOUNTER — HOSPITAL ENCOUNTER (OUTPATIENT)
Dept: CARDIOLOGY | Facility: CLINIC | Age: 56
Discharge: HOME | End: 2021-05-06
Payer: COMMERCIAL

## 2021-05-06 VITALS
BODY MASS INDEX: 32.67 KG/M2 | RESPIRATION RATE: 18 BRPM | WEIGHT: 191.38 LBS | TEMPERATURE: 97 F | DIASTOLIC BLOOD PRESSURE: 72 MMHG | HEIGHT: 64 IN | SYSTOLIC BLOOD PRESSURE: 118 MMHG | OXYGEN SATURATION: 99 % | HEART RATE: 80 BPM

## 2021-05-06 DIAGNOSIS — Z91.89 AT RISK FOR CORONARY ARTERY DISEASE: ICD-10-CM

## 2021-05-06 DIAGNOSIS — Z00.00 ROUTINE GENERAL MEDICAL EXAMINATION AT A HEALTH CARE FACILITY: ICD-10-CM

## 2021-05-06 DIAGNOSIS — I25.10 ATHEROSCLEROSIS OF NATIVE CORONARY ARTERY OF NATIVE HEART WITHOUT ANGINA PECTORIS: ICD-10-CM

## 2021-05-06 DIAGNOSIS — Z00.00 ANNUAL PHYSICAL EXAM: Primary | ICD-10-CM

## 2021-05-06 DIAGNOSIS — G56.01 CARPAL TUNNEL SYNDROME OF RIGHT WRIST: ICD-10-CM

## 2021-05-06 DIAGNOSIS — R97.20 ELEVATED PSA: ICD-10-CM

## 2021-05-06 DIAGNOSIS — I10 HYPERTENSION, ESSENTIAL: ICD-10-CM

## 2021-05-06 DIAGNOSIS — Z00.00 ROUTINE GENERAL MEDICAL EXAMINATION AT A HEALTH CARE FACILITY: Primary | ICD-10-CM

## 2021-05-06 LAB
ALBUMIN SERPL BCP-MCNC: 4.3 G/DL (ref 3.5–5.2)
ALP SERPL-CCNC: 40 U/L (ref 55–135)
ALT SERPL W/O P-5'-P-CCNC: 34 U/L (ref 10–44)
ANION GAP SERPL CALC-SCNC: 7 MMOL/L (ref 8–16)
AST SERPL-CCNC: 25 U/L (ref 10–40)
BILIRUB SERPL-MCNC: 0.5 MG/DL (ref 0.1–1)
BUN SERPL-MCNC: 23 MG/DL (ref 6–20)
CALCIUM SERPL-MCNC: 10.1 MG/DL (ref 8.7–10.5)
CHLORIDE SERPL-SCNC: 105 MMOL/L (ref 95–110)
CHOLEST SERPL-MCNC: 151 MG/DL (ref 120–199)
CHOLEST/HDLC SERPL: 4.1 {RATIO} (ref 2–5)
CO2 SERPL-SCNC: 28 MMOL/L (ref 23–29)
COMPLEXED PSA SERPL-MCNC: 4.3 NG/ML (ref 0–4)
CREAT SERPL-MCNC: 1.2 MG/DL (ref 0.5–1.4)
ERYTHROCYTE [DISTWIDTH] IN BLOOD BY AUTOMATED COUNT: 13 % (ref 11.5–14.5)
EST. GFR  (AFRICAN AMERICAN): >60 ML/MIN/1.73 M^2
EST. GFR  (NON AFRICAN AMERICAN): >60 ML/MIN/1.73 M^2
GLUCOSE SERPL-MCNC: 102 MG/DL (ref 70–110)
HCT VFR BLD AUTO: 42.3 % (ref 40–54)
HDLC SERPL-MCNC: 37 MG/DL (ref 40–75)
HDLC SERPL: 24.5 % (ref 20–50)
HGB BLD-MCNC: 14.4 G/DL (ref 14–18)
LDLC SERPL CALC-MCNC: 87.2 MG/DL (ref 63–159)
MCH RBC QN AUTO: 30.1 PG (ref 27–31)
MCHC RBC AUTO-ENTMCNC: 34 G/DL (ref 32–36)
MCV RBC AUTO: 88 FL (ref 82–98)
NONHDLC SERPL-MCNC: 114 MG/DL
PLATELET # BLD AUTO: 286 K/UL (ref 150–450)
PMV BLD AUTO: 9.5 FL (ref 9.2–12.9)
POTASSIUM SERPL-SCNC: 4.7 MMOL/L (ref 3.5–5.1)
PROT SERPL-MCNC: 7.7 G/DL (ref 6–8.4)
RBC # BLD AUTO: 4.79 M/UL (ref 4.6–6.2)
SODIUM SERPL-SCNC: 140 MMOL/L (ref 136–145)
TRIGL SERPL-MCNC: 134 MG/DL (ref 30–150)
WBC # BLD AUTO: 8.07 K/UL (ref 3.9–12.7)

## 2021-05-06 PROCEDURE — 99396 PR PREVENTIVE VISIT,EST,40-64: ICD-10-PCS | Mod: S$GLB,,, | Performed by: FAMILY MEDICINE

## 2021-05-06 PROCEDURE — 80061 LIPID PANEL: CPT | Performed by: FAMILY MEDICINE

## 2021-05-06 PROCEDURE — 93010 ELECTROCARDIOGRAM REPORT: CPT | Mod: S$GLB,,, | Performed by: INTERNAL MEDICINE

## 2021-05-06 PROCEDURE — 93005 EKG 12-LEAD: ICD-10-PCS | Mod: S$GLB,,, | Performed by: FAMILY MEDICINE

## 2021-05-06 PROCEDURE — 84153 ASSAY OF PSA TOTAL: CPT | Performed by: FAMILY MEDICINE

## 2021-05-06 PROCEDURE — 80053 COMPREHEN METABOLIC PANEL: CPT | Performed by: FAMILY MEDICINE

## 2021-05-06 PROCEDURE — 93005 ELECTROCARDIOGRAM TRACING: CPT | Mod: S$GLB,,, | Performed by: FAMILY MEDICINE

## 2021-05-06 PROCEDURE — 99396 PREV VISIT EST AGE 40-64: CPT | Mod: S$GLB,,, | Performed by: FAMILY MEDICINE

## 2021-05-06 PROCEDURE — 93010 EKG 12-LEAD: ICD-10-PCS | Mod: S$GLB,,, | Performed by: INTERNAL MEDICINE

## 2021-05-06 PROCEDURE — 85027 COMPLETE CBC AUTOMATED: CPT | Performed by: FAMILY MEDICINE

## 2021-05-06 PROCEDURE — 99999 PR PBB SHADOW E&M-EST. PATIENT-LVL V: ICD-10-PCS | Mod: PBBFAC,,, | Performed by: FAMILY MEDICINE

## 2021-05-06 PROCEDURE — 99999 PR PBB SHADOW E&M-EST. PATIENT-LVL V: CPT | Mod: PBBFAC,,, | Performed by: FAMILY MEDICINE

## 2021-05-06 RX ORDER — LOSARTAN POTASSIUM AND HYDROCHLOROTHIAZIDE 12.5; 1 MG/1; MG/1
1 TABLET ORAL DAILY
Qty: 90 TABLET | Refills: 3 | Status: SHIPPED | OUTPATIENT
Start: 2021-05-06 | End: 2022-04-08 | Stop reason: ALTCHOICE

## 2021-06-02 ENCOUNTER — TELEPHONE (OUTPATIENT)
Dept: INTERNAL MEDICINE | Facility: CLINIC | Age: 56
End: 2021-06-02

## 2021-06-04 ENCOUNTER — TELEPHONE (OUTPATIENT)
Dept: INTERNAL MEDICINE | Facility: CLINIC | Age: 56
End: 2021-06-04

## 2021-06-04 ENCOUNTER — LAB VISIT (OUTPATIENT)
Dept: LAB | Facility: HOSPITAL | Age: 56
End: 2021-06-04
Attending: FAMILY MEDICINE
Payer: COMMERCIAL

## 2021-06-04 DIAGNOSIS — R97.20 ELEVATED PSA: Primary | ICD-10-CM

## 2021-06-04 DIAGNOSIS — Z12.5 PROSTATE CANCER SCREENING: ICD-10-CM

## 2021-06-04 DIAGNOSIS — Z00.00 ROUTINE GENERAL MEDICAL EXAMINATION AT A HEALTH CARE FACILITY: ICD-10-CM

## 2021-06-04 LAB — COMPLEXED PSA SERPL-MCNC: 3.7 NG/ML (ref 0–4)

## 2021-06-04 PROCEDURE — 84153 ASSAY OF PSA TOTAL: CPT | Performed by: FAMILY MEDICINE

## 2021-06-04 PROCEDURE — 36415 COLL VENOUS BLD VENIPUNCTURE: CPT | Performed by: FAMILY MEDICINE

## 2021-06-08 ENCOUNTER — TELEPHONE (OUTPATIENT)
Dept: INTERNAL MEDICINE | Facility: CLINIC | Age: 56
End: 2021-06-08

## 2021-06-15 ENCOUNTER — TELEPHONE (OUTPATIENT)
Dept: INTERNAL MEDICINE | Facility: CLINIC | Age: 56
End: 2021-06-15

## 2021-06-18 ENCOUNTER — TELEPHONE (OUTPATIENT)
Dept: INTERNAL MEDICINE | Facility: CLINIC | Age: 56
End: 2021-06-18

## 2021-06-18 DIAGNOSIS — G47.33 OSA (OBSTRUCTIVE SLEEP APNEA): Primary | ICD-10-CM

## 2021-07-02 ENCOUNTER — HOSPITAL ENCOUNTER (OUTPATIENT)
Dept: RADIOLOGY | Facility: HOSPITAL | Age: 56
Discharge: HOME | End: 2021-07-02
Attending: FAMILY MEDICINE
Payer: COMMERCIAL

## 2021-07-02 DIAGNOSIS — I25.10 ATHEROSCLEROSIS OF NATIVE CORONARY ARTERY OF NATIVE HEART WITHOUT ANGINA PECTORIS: ICD-10-CM

## 2021-07-02 DIAGNOSIS — Z91.89 AT RISK FOR CORONARY ARTERY DISEASE: ICD-10-CM

## 2021-07-02 PROCEDURE — 75571 CT CALCIUM SCORING CARDIAC: ICD-10-PCS | Mod: 26,,, | Performed by: RADIOLOGY

## 2021-07-02 PROCEDURE — 75571 CT HRT W/O DYE W/CA TEST: CPT | Mod: 26,,, | Performed by: RADIOLOGY

## 2021-07-02 PROCEDURE — 75571 CT HRT W/O DYE W/CA TEST: CPT | Mod: TC

## 2021-07-16 ENCOUNTER — OFFICE VISIT (OUTPATIENT)
Dept: SLEEP MEDICINE | Facility: CLINIC | Age: 56
End: 2021-07-16
Payer: COMMERCIAL

## 2021-07-16 VITALS
HEART RATE: 70 BPM | BODY MASS INDEX: 32.85 KG/M2 | DIASTOLIC BLOOD PRESSURE: 88 MMHG | SYSTOLIC BLOOD PRESSURE: 150 MMHG | HEIGHT: 64 IN

## 2021-07-16 DIAGNOSIS — G47.33 OSA (OBSTRUCTIVE SLEEP APNEA): ICD-10-CM

## 2021-07-16 PROCEDURE — 99999 PR PBB SHADOW E&M-EST. PATIENT-LVL II: ICD-10-PCS | Mod: PBBFAC,,, | Performed by: INTERNAL MEDICINE

## 2021-07-16 PROCEDURE — 99204 OFFICE O/P NEW MOD 45 MIN: CPT | Mod: S$GLB,,, | Performed by: INTERNAL MEDICINE

## 2021-07-16 PROCEDURE — 99999 PR PBB SHADOW E&M-EST. PATIENT-LVL II: CPT | Mod: PBBFAC,,, | Performed by: INTERNAL MEDICINE

## 2021-07-16 PROCEDURE — 99204 PR OFFICE/OUTPT VISIT, NEW, LEVL IV, 45-59 MIN: ICD-10-PCS | Mod: S$GLB,,, | Performed by: INTERNAL MEDICINE

## 2021-07-21 ENCOUNTER — TELEPHONE (OUTPATIENT)
Dept: SLEEP MEDICINE | Facility: OTHER | Age: 56
End: 2021-07-21

## 2021-07-28 ENCOUNTER — TELEPHONE (OUTPATIENT)
Dept: INTERNAL MEDICINE | Facility: CLINIC | Age: 56
End: 2021-07-28

## 2021-07-29 ENCOUNTER — TELEPHONE (OUTPATIENT)
Dept: INTERNAL MEDICINE | Facility: CLINIC | Age: 56
End: 2021-07-29

## 2021-07-29 DIAGNOSIS — R97.20 ELEVATED PSA: Primary | ICD-10-CM

## 2021-07-30 ENCOUNTER — HOSPITAL ENCOUNTER (OUTPATIENT)
Dept: SLEEP MEDICINE | Facility: OTHER | Age: 56
Discharge: HOME | End: 2021-07-30
Attending: INTERNAL MEDICINE
Payer: COMMERCIAL

## 2021-07-30 DIAGNOSIS — G47.33 OSA (OBSTRUCTIVE SLEEP APNEA): ICD-10-CM

## 2021-07-30 PROCEDURE — 95800 SLP STDY UNATTENDED: CPT

## 2021-07-30 PROCEDURE — 95800 SLP STDY UNATTENDED: CPT | Mod: 26,,, | Performed by: INTERNAL MEDICINE

## 2021-07-30 PROCEDURE — 95800 PR SLEEP STUDY, UNATTENDED, RECORD HEART RATE/O2 SAT/RESP ANAL/SLEEP TIME: ICD-10-PCS | Mod: 26,,, | Performed by: INTERNAL MEDICINE

## 2021-08-03 ENCOUNTER — PATIENT MESSAGE (OUTPATIENT)
Dept: SLEEP MEDICINE | Facility: CLINIC | Age: 56
End: 2021-08-03

## 2021-08-03 DIAGNOSIS — G47.33 OSA (OBSTRUCTIVE SLEEP APNEA): Primary | ICD-10-CM

## 2021-11-08 ENCOUNTER — NURSE TRIAGE (OUTPATIENT)
Dept: ADMINISTRATIVE | Facility: CLINIC | Age: 56
End: 2021-11-08
Payer: COMMERCIAL

## 2021-11-08 ENCOUNTER — TELEPHONE (OUTPATIENT)
Dept: SLEEP MEDICINE | Facility: CLINIC | Age: 56
End: 2021-11-08
Payer: COMMERCIAL

## 2021-11-09 ENCOUNTER — TELEPHONE (OUTPATIENT)
Dept: SLEEP MEDICINE | Facility: CLINIC | Age: 56
End: 2021-11-09
Payer: COMMERCIAL

## 2021-11-10 ENCOUNTER — TELEPHONE (OUTPATIENT)
Dept: SLEEP MEDICINE | Facility: CLINIC | Age: 56
End: 2021-11-10
Payer: COMMERCIAL

## 2021-11-10 DIAGNOSIS — G47.33 OSA (OBSTRUCTIVE SLEEP APNEA): Primary | ICD-10-CM

## 2022-01-21 DIAGNOSIS — Z00.00 ROUTINE GENERAL MEDICAL EXAMINATION AT A HEALTH CARE FACILITY: Primary | ICD-10-CM

## 2022-02-11 ENCOUNTER — CLINICAL SUPPORT (OUTPATIENT)
Dept: INTERNAL MEDICINE | Facility: CLINIC | Age: 57
End: 2022-02-11
Payer: COMMERCIAL

## 2022-02-11 ENCOUNTER — HOSPITAL ENCOUNTER (OUTPATIENT)
Dept: RADIOLOGY | Facility: HOSPITAL | Age: 57
Discharge: HOME | End: 2022-02-11
Attending: INTERNAL MEDICINE
Payer: COMMERCIAL

## 2022-02-11 ENCOUNTER — HOSPITAL ENCOUNTER (OUTPATIENT)
Dept: CARDIOLOGY | Facility: CLINIC | Age: 57
Discharge: HOME | End: 2022-02-11
Payer: COMMERCIAL

## 2022-02-11 ENCOUNTER — OFFICE VISIT (OUTPATIENT)
Dept: INTERNAL MEDICINE | Facility: CLINIC | Age: 57
End: 2022-02-11
Payer: COMMERCIAL

## 2022-02-11 DIAGNOSIS — Z00.00 ROUTINE GENERAL MEDICAL EXAMINATION AT A HEALTH CARE FACILITY: ICD-10-CM

## 2022-02-11 DIAGNOSIS — Z00.00 ROUTINE GENERAL MEDICAL EXAMINATION AT A HEALTH CARE FACILITY: Primary | ICD-10-CM

## 2022-02-11 LAB
ALBUMIN SERPL BCP-MCNC: 4.4 G/DL (ref 3.5–5.2)
ALP SERPL-CCNC: 47 U/L (ref 55–135)
ALT SERPL W/O P-5'-P-CCNC: 49 U/L (ref 10–44)
ANION GAP SERPL CALC-SCNC: 9 MMOL/L (ref 8–16)
AST SERPL-CCNC: 29 U/L (ref 10–40)
BILIRUB SERPL-MCNC: 0.7 MG/DL (ref 0.1–1)
BUN SERPL-MCNC: 20 MG/DL (ref 6–20)
CALCIUM SERPL-MCNC: 10.1 MG/DL (ref 8.7–10.5)
CHLORIDE SERPL-SCNC: 104 MMOL/L (ref 95–110)
CHOLEST SERPL-MCNC: 168 MG/DL (ref 120–199)
CHOLEST/HDLC SERPL: 4 {RATIO} (ref 2–5)
CO2 SERPL-SCNC: 29 MMOL/L (ref 23–29)
COMPLEXED PSA SERPL-MCNC: 4.3 NG/ML (ref 0–4)
CREAT SERPL-MCNC: 1.1 MG/DL (ref 0.5–1.4)
ERYTHROCYTE [DISTWIDTH] IN BLOOD BY AUTOMATED COUNT: 13.1 % (ref 11.5–14.5)
EST. GFR  (AFRICAN AMERICAN): >60 ML/MIN/1.73 M^2
EST. GFR  (NON AFRICAN AMERICAN): >60 ML/MIN/1.73 M^2
GLUCOSE SERPL-MCNC: 103 MG/DL (ref 70–110)
HCT VFR BLD AUTO: 43.7 % (ref 40–54)
HDLC SERPL-MCNC: 42 MG/DL (ref 40–75)
HDLC SERPL: 25 % (ref 20–50)
HGB BLD-MCNC: 14.7 G/DL (ref 14–18)
LDLC SERPL CALC-MCNC: 105 MG/DL (ref 63–159)
MCH RBC QN AUTO: 30.8 PG (ref 27–31)
MCHC RBC AUTO-ENTMCNC: 33.6 G/DL (ref 32–36)
MCV RBC AUTO: 91 FL (ref 82–98)
NONHDLC SERPL-MCNC: 126 MG/DL
PLATELET # BLD AUTO: 266 K/UL (ref 150–450)
PMV BLD AUTO: 9.6 FL (ref 9.2–12.9)
POTASSIUM SERPL-SCNC: 4 MMOL/L (ref 3.5–5.1)
PROT SERPL-MCNC: 7.7 G/DL (ref 6–8.4)
RBC # BLD AUTO: 4.78 M/UL (ref 4.6–6.2)
SODIUM SERPL-SCNC: 142 MMOL/L (ref 136–145)
TRIGL SERPL-MCNC: 105 MG/DL (ref 30–150)
WBC # BLD AUTO: 7.42 K/UL (ref 3.9–12.7)

## 2022-02-11 PROCEDURE — 99386 PREV VISIT NEW AGE 40-64: CPT | Mod: S$GLB,,, | Performed by: INTERNAL MEDICINE

## 2022-02-11 PROCEDURE — 99999 PR PBB SHADOW E&M-EST. PATIENT-LVL I: ICD-10-PCS | Mod: PBBFAC,,,

## 2022-02-11 PROCEDURE — 71046 X-RAY EXAM CHEST 2 VIEWS: CPT | Mod: 26,,, | Performed by: RADIOLOGY

## 2022-02-11 PROCEDURE — 85027 COMPLETE CBC AUTOMATED: CPT | Performed by: INTERNAL MEDICINE

## 2022-02-11 PROCEDURE — 93010 EKG 12-LEAD: ICD-10-PCS | Mod: S$GLB,,, | Performed by: INTERNAL MEDICINE

## 2022-02-11 PROCEDURE — 93005 EKG 12-LEAD: ICD-10-PCS | Mod: S$GLB,,, | Performed by: INTERNAL MEDICINE

## 2022-02-11 PROCEDURE — 80061 LIPID PANEL: CPT | Performed by: INTERNAL MEDICINE

## 2022-02-11 PROCEDURE — 71046 XR CHEST PA AND LATERAL: ICD-10-PCS | Mod: 26,,, | Performed by: RADIOLOGY

## 2022-02-11 PROCEDURE — 84153 ASSAY OF PSA TOTAL: CPT | Performed by: INTERNAL MEDICINE

## 2022-02-11 PROCEDURE — 93010 ELECTROCARDIOGRAM REPORT: CPT | Mod: S$GLB,,, | Performed by: INTERNAL MEDICINE

## 2022-02-11 PROCEDURE — 80053 COMPREHEN METABOLIC PANEL: CPT | Performed by: INTERNAL MEDICINE

## 2022-02-11 PROCEDURE — 99999 PR PBB SHADOW E&M-EST. PATIENT-LVL III: CPT | Mod: PBBFAC,,, | Performed by: INTERNAL MEDICINE

## 2022-02-11 PROCEDURE — 71046 X-RAY EXAM CHEST 2 VIEWS: CPT | Mod: TC,FY

## 2022-02-11 PROCEDURE — 93005 ELECTROCARDIOGRAM TRACING: CPT | Mod: S$GLB,,, | Performed by: INTERNAL MEDICINE

## 2022-02-11 PROCEDURE — 99999 PR PBB SHADOW E&M-EST. PATIENT-LVL I: CPT | Mod: PBBFAC,,,

## 2022-02-11 PROCEDURE — 99386 PR PREVENTIVE VISIT,NEW,40-64: ICD-10-PCS | Mod: S$GLB,,, | Performed by: INTERNAL MEDICINE

## 2022-02-11 PROCEDURE — 99999 PR PBB SHADOW E&M-EST. PATIENT-LVL III: ICD-10-PCS | Mod: PBBFAC,,, | Performed by: INTERNAL MEDICINE

## 2022-02-11 RX ORDER — ASPIRIN 81 MG/1
81 TABLET ORAL DAILY
COMMUNITY

## 2022-02-14 ENCOUNTER — DOCUMENTATION ONLY (OUTPATIENT)
Dept: INTERNAL MEDICINE | Facility: CLINIC | Age: 57
End: 2022-02-14
Payer: COMMERCIAL

## 2022-02-14 NOTE — PROGRESS NOTES
Pt. Instructed to take Losartan 100 mg by once a day. Take B/P four times a day. Do this for 1-2 wks & Dr. Peñaloza will reasses at that time. Pt verbalized understanding.

## 2022-02-23 VITALS
BODY MASS INDEX: 33.32 KG/M2 | DIASTOLIC BLOOD PRESSURE: 86 MMHG | HEART RATE: 64 BPM | SYSTOLIC BLOOD PRESSURE: 130 MMHG | WEIGHT: 194.13 LBS

## 2022-02-23 NOTE — PROGRESS NOTES
Subjective:       Patient ID: Jerome Almazan is a 56 y.o. male.    Chief Complaint: Executive Health    HPIMr Almazan is here for his Executive Health exam. He is a very pleasant gentleman from [place] that is doing well overall. He is very active physically, enjoys weight-lifting and cardio exercises. We discussed the importance of these activities as well as diet and weight management vis a vis future health issues including heart disease. He recently had CT Coronary Calcium test hussain that showed an Agatston score of 30, indicating some degree of (mild) plaque burden as per report. His blood work showed normal lipids and will continue with his activities as always. We also discussed the fact that he has noted some male-related issues since taking HCTZ as part of his blood pressure medications. This was added to his Losartan for better blood pressure control by his PCP, but noted the side effects as above. I recommend he discuss this issue with his PCP as there are alternatives that might have a more favorable effect on his pressures and other issues. I also discussed the results of the recent sleep study he had done at home that showed some degree of LUCY. Mr Almazan is on CPAP for this and uses it regularly.  I am sending copies of his studies incl;uding blood work that showed increase in PSA level at 4.3. He has had this issue before, but has not seen Urology for evaluation. I will make these arrangements for his and proceed from there. The remainder of the results were unremarkable including normal chest xray and EKG.  Review of Systems   All other systems reviewed and are negative.        Objective:      Physical Exam  Vitals and nursing note reviewed.   Constitutional:       General: He is not in acute distress.     Appearance: Normal appearance. He is normal weight.      Comments: Muscular gentleman in [facility].   HENT:      Head: Normocephalic and atraumatic.      Right Ear: Tympanic membrane, ear canal  and external ear normal. There is no impacted cerumen.      Left Ear: Tympanic membrane, ear canal and external ear normal. There is no impacted cerumen.      Nose: Nose normal.      Mouth/Throat:      Mouth: Mucous membranes are moist.      Pharynx: Oropharynx is clear.   Eyes:      General: No scleral icterus.        Right eye: No discharge.         Left eye: No discharge.      Extraocular Movements: Extraocular movements intact.      Conjunctiva/sclera: Conjunctivae normal.      Pupils: Pupils are equal, round, and reactive to light.   Cardiovascular:      Rate and Rhythm: Normal rate and regular rhythm.      Pulses: Normal pulses.      Heart sounds: Normal heart sounds. No murmur heard.  Pulmonary:      Effort: Pulmonary effort is normal. No respiratory distress.      Breath sounds: Normal breath sounds. No wheezing.   Chest:      Chest wall: No tenderness.   Abdominal:      General: Abdomen is flat. Bowel sounds are normal. There is no distension.      Palpations: Abdomen is soft. There is no mass.      Tenderness: There is no abdominal tenderness.   Musculoskeletal:         General: No tenderness. Normal range of motion.      Cervical back: Normal range of motion and neck supple. No rigidity or tenderness.      Right lower leg: No edema.      Left lower leg: No edema.   Lymphadenopathy:      Cervical: No cervical adenopathy.   Skin:     General: Skin is warm and dry.      Findings: No erythema, lesion or rash.   Neurological:      General: No focal deficit present.      Mental Status: He is alert and oriented to person, place, and time.      Cranial Nerves: No cranial nerve deficit.      Motor: No weakness.   Psychiatric:         Mood and Affect: Mood normal.         Behavior: Behavior normal.         Thought Content: Thought content normal.         Judgment: Judgment normal.         Assessment:       Problem List Items Addressed This Visit    None     Visit Diagnoses     Routine general medical examination at  a health care facility    -  Primary         1. Issues discussed as detailed above - male-related issues with HCTZ; increased PSA level; Obstructive Sleep Apnea (LUCY).  Plan:    1. Stop HCTZ and continue with Losartan 100 mgs daily.         2. Monitor blood pressure as discussed; keep diary.         3. Refer to Urology - appointment included.         4. Return to Clinic in 1 month for blood pressure follow up and in 1 year for annual exam.

## 2022-03-16 ENCOUNTER — TELEPHONE (OUTPATIENT)
Dept: INTERNAL MEDICINE | Facility: CLINIC | Age: 57
End: 2022-03-16
Payer: COMMERCIAL

## 2022-03-23 ENCOUNTER — PATIENT OUTREACH (OUTPATIENT)
Dept: ADMINISTRATIVE | Facility: OTHER | Age: 57
End: 2022-03-23
Payer: COMMERCIAL

## 2022-03-23 NOTE — PROGRESS NOTES
Health Maintenance Due   Topic Date Due    Hepatitis C Screening  Never done    COVID-19 Vaccine (1) Never done    Influenza Vaccine (1) Never done     Updates were requested from care everywhere.  Chart was reviewed for overdue Proactive [facility] Encounters (VIJAY) topics (CRS, Breast Cancer Screening, Eye exam)  Health Maintenance has been updated.  LINKS immunization registry triggered.  Immunizations were reconciled.

## 2022-03-24 ENCOUNTER — OFFICE VISIT (OUTPATIENT)
Dept: SLEEP MEDICINE | Facility: CLINIC | Age: 57
End: 2022-03-24
Payer: COMMERCIAL

## 2022-03-24 VITALS — SYSTOLIC BLOOD PRESSURE: 141 MMHG | HEART RATE: 76 BPM | DIASTOLIC BLOOD PRESSURE: 84 MMHG

## 2022-03-24 DIAGNOSIS — R53.83 FATIGUE, UNSPECIFIED TYPE: ICD-10-CM

## 2022-03-24 DIAGNOSIS — G47.33 OSA (OBSTRUCTIVE SLEEP APNEA): Primary | ICD-10-CM

## 2022-03-24 DIAGNOSIS — R35.1 NOCTURIA: ICD-10-CM

## 2022-03-24 PROCEDURE — 99999 PR PBB SHADOW E&M-EST. PATIENT-LVL II: ICD-10-PCS | Mod: PBBFAC,,, | Performed by: INTERNAL MEDICINE

## 2022-03-24 PROCEDURE — 99214 OFFICE O/P EST MOD 30 MIN: CPT | Mod: S$GLB,,, | Performed by: INTERNAL MEDICINE

## 2022-03-24 PROCEDURE — 99214 PR OFFICE/OUTPT VISIT, EST, LEVL IV, 30-39 MIN: ICD-10-PCS | Mod: S$GLB,,, | Performed by: INTERNAL MEDICINE

## 2022-03-24 PROCEDURE — 99999 PR PBB SHADOW E&M-EST. PATIENT-LVL II: CPT | Mod: PBBFAC,,, | Performed by: INTERNAL MEDICINE

## 2022-03-24 NOTE — PROGRESS NOTES
ESTABLISHED PATIENT VISIT  Chief complaint: Tiredness    HPI:  Jerome Almazan  is a pleasant 56 y.o. male with past medical history significant for HTN.     Here today for CPAP follow-up    PLAN last visit:   -will start with Home Sleep Testing   -we discussed trial of PAP therapy if ULCY is present    -driving precautions were discussed with the patient    Since last visit:     HST 7.30.21: AHI 13, RDI 26, <90% x 3.4%  Struggled with nasal mask  Better since FFM    PAP history   Problems    Mask Nasal (struggled)  FFM (doing better)   Pressure tolerating   Benefit    DME HME   Machine age 2021, late   Download 3.23.22: 30/30 x 5hrs, 5-12 (x/6.0/7.5), AHI 0.3, Leak 24lpm     SLEEP SCHEDULE   Bed Time 8:30   Sleep Latency Not long   Arousals Once (less often)   Nocturia 1   Back to sleep    Wake time 3:45AM   Naps    Work        Vitals:    03/24/22 1121   BP: (!) 141/84   BP Location: Right arm   Patient Position: Sitting   BP Method: Medium (Automatic)   Pulse: 76     Physical Exam:    GEN:   Well-appearing  Psych:  Appropriate affect, demonstrates insight  SKIN:  No rash on the face or bridge of the nose      RECORDS REVIEWED TODAY:    Lab Results   Component Value Date    HGB 14.7 02/11/2022    CO2 29 02/11/2022       RECORDS REVIEWED PREVIOUSLY:    N/a      ASSESSMENT  PROBLEM DESCRIPTION/ Sx on Presentation Interval Hx STATUS   LUCY   Mild to moderate on HST   Good usage and efficacy Good usage   Daytime Sx   ESS 4/24   Less drowsy in meetings  No sleepiness driving improved   Nocturia   Once per night No longer having nocturia improved   Comorbidities:     PLAN     -continue auto 5-12  -continue FFM  -using and benefitting from PAP therapy    RTC          The patient was given open opportunity to ask questions and/or express concerns about treatment plan.   All questions/concerns were discussed.     Two patient identifiers used prior to evaluation.

## 2022-03-25 ENCOUNTER — OFFICE VISIT (OUTPATIENT)
Dept: INTERNAL MEDICINE | Facility: CLINIC | Age: 57
End: 2022-03-25
Payer: COMMERCIAL

## 2022-03-25 DIAGNOSIS — I10 HYPERTENSION, ESSENTIAL: Primary | ICD-10-CM

## 2022-03-25 DIAGNOSIS — R97.20 ELEVATED PSA: ICD-10-CM

## 2022-03-25 PROCEDURE — 99999 PR PBB SHADOW E&M-EST. PATIENT-LVL II: CPT | Mod: PBBFAC,,, | Performed by: INTERNAL MEDICINE

## 2022-03-25 PROCEDURE — 99213 PR OFFICE/OUTPT VISIT, EST, LEVL III, 20-29 MIN: ICD-10-PCS | Mod: S$GLB,,, | Performed by: INTERNAL MEDICINE

## 2022-03-25 PROCEDURE — 99999 PR PBB SHADOW E&M-EST. PATIENT-LVL II: ICD-10-PCS | Mod: PBBFAC,,, | Performed by: INTERNAL MEDICINE

## 2022-03-25 PROCEDURE — 99213 OFFICE O/P EST LOW 20 MIN: CPT | Mod: S$GLB,,, | Performed by: INTERNAL MEDICINE

## 2022-03-28 VITALS — DIASTOLIC BLOOD PRESSURE: 82 MMHG | SYSTOLIC BLOOD PRESSURE: 132 MMHG | HEART RATE: 64 BPM

## 2022-03-28 NOTE — PROGRESS NOTES
Subjective:       Patient ID: Jerome Almazan is a 56 y.o. male.    Chief Complaint: Follow-up and Hypertension    Tyler Almazan is here for follow up HTN. I recently saw him for [facility] and made some adjustments to his BP meds. He was on Losartan/HCTZ, but noted male related issues with the addition of the HCTZ. I stopped this , at his request, and gave him rx for Losartan 100 mgs daily and had him monitor his BP, keeping diary as well. This shows pressures mainly in the 130's/80's which is improved. He is still experiencing some ED issues and discussed medications available for this. He also has an elevated PSA for which I had made a Urology appt. He did not go as it conflicted with his schedule, so, the date was changed to satisfy that issue. He sates he will attend and will discuss the ED med issue with them.  Review of Systems   All other systems reviewed and are negative.        Objective:      Physical Exam  Vitals and nursing note reviewed.   Constitutional:       General: He is not in acute distress.     Appearance: Normal appearance. He is normal weight.   Cardiovascular:      Rate and Rhythm: Normal rate and regular rhythm.      Pulses: Normal pulses.      Heart sounds: Normal heart sounds. No murmur heard.  Pulmonary:      Effort: Pulmonary effort is normal. No respiratory distress.      Breath sounds: Normal breath sounds. No wheezing.   Chest:      Chest wall: No tenderness.   Neurological:      Mental Status: He is alert.         Assessment:       Problem List Items Addressed This Visit     Elevated PSA    Hypertension, essential - Primary          Plan:    1. Continue with current medications.         2. Refer to Urology.         3. RTC 1 month.

## 2022-04-08 ENCOUNTER — OFFICE VISIT (OUTPATIENT)
Dept: UROLOGY | Facility: CLINIC | Age: 57
End: 2022-04-08
Payer: COMMERCIAL

## 2022-04-08 ENCOUNTER — LAB VISIT (OUTPATIENT)
Dept: LAB | Facility: HOSPITAL | Age: 57
End: 2022-04-08
Attending: UROLOGY
Payer: COMMERCIAL

## 2022-04-08 VITALS — BODY MASS INDEX: 32.71 KG/M2 | WEIGHT: 190.56 LBS

## 2022-04-08 DIAGNOSIS — R97.20 ELEVATED PSA: ICD-10-CM

## 2022-04-08 DIAGNOSIS — R97.20 ELEVATED PSA: Primary | ICD-10-CM

## 2022-04-08 LAB
ANION GAP SERPL CALC-SCNC: 8 MMOL/L (ref 8–16)
BUN SERPL-MCNC: 23 MG/DL (ref 6–20)
CALCIUM SERPL-MCNC: 9.8 MG/DL (ref 8.7–10.5)
CHLORIDE SERPL-SCNC: 104 MMOL/L (ref 95–110)
CO2 SERPL-SCNC: 29 MMOL/L (ref 23–29)
CREAT SERPL-MCNC: 1.1 MG/DL (ref 0.5–1.4)
EST. GFR  (AFRICAN AMERICAN): >60 ML/MIN/1.73 M^2
EST. GFR  (NON AFRICAN AMERICAN): >60 ML/MIN/1.73 M^2
GLUCOSE SERPL-MCNC: 94 MG/DL (ref 70–110)
POTASSIUM SERPL-SCNC: 4.3 MMOL/L (ref 3.5–5.1)
SODIUM SERPL-SCNC: 141 MMOL/L (ref 136–145)

## 2022-04-08 PROCEDURE — 99204 PR OFFICE/OUTPT VISIT, NEW, LEVL IV, 45-59 MIN: ICD-10-PCS | Mod: S$GLB,,, | Performed by: UROLOGY

## 2022-04-08 PROCEDURE — 80048 BASIC METABOLIC PNL TOTAL CA: CPT | Performed by: UROLOGY

## 2022-04-08 PROCEDURE — 99204 OFFICE O/P NEW MOD 45 MIN: CPT | Mod: S$GLB,,, | Performed by: UROLOGY

## 2022-04-08 PROCEDURE — 99999 PR PBB SHADOW E&M-EST. PATIENT-LVL II: ICD-10-PCS | Mod: PBBFAC,,, | Performed by: UROLOGY

## 2022-04-08 PROCEDURE — 36415 COLL VENOUS BLD VENIPUNCTURE: CPT | Performed by: UROLOGY

## 2022-04-08 PROCEDURE — 99999 PR PBB SHADOW E&M-EST. PATIENT-LVL II: CPT | Mod: PBBFAC,,, | Performed by: UROLOGY

## 2022-04-08 NOTE — PROGRESS NOTES
Subjective:       Patient ID: Jerome Almazan is a 56 y.o. male.    Chief Complaint: No chief complaint on file.     This is a 56 y.o.  male patient that is new to me.  The patient was referred to me by PCP for elevated PSA.  Previously abnormal PSA: yes. Previous biopsy: yes, biopsy Dr. Levi 2011 was negative 30 g prostate at time. Previous abnormal JASON: no.  Family history of prostate cancer: no.  Blood thinners:  no.  No significant LUTs, no hematuria.  No dysuria.      LAST PSA  Lab Results   Component Value Date    PSA 4.3 (H) 02/11/2022    PSA 3.7 06/04/2021    PSA 4.3 (H) 05/06/2021    PSA 3.7 07/31/2020    PSA 3.0 04/12/2019    PSA 3.1 03/13/2019    PSA 2.9 04/27/2018    PSA 4.1 (H) 02/17/2017    PSA 3.6 11/11/2016    PSA 3.0 07/10/2015    PSA 3.9 09/19/2014    PSA 3.78 03/26/2013    PSA 2.4 09/21/2012    PSA 2.70 03/09/2012    PSA 3.6 09/16/2011    PSA 3.8 06/24/2011    PSA 2.4 10/02/2009    PSA 1.6 06/06/2008       Lab Results   Component Value Date    CREATININE 1.1 02/11/2022       ---  Past Medical History:   Diagnosis Date    Abnormal PSA 4/5/2013    Hypertension        Past Surgical History:   Procedure Laterality Date    CARPAL TUNNEL RELEASE      SHOULDER SURGERY         Family History   Problem Relation Age of Onset    Heart disease Mother     Heart disease Father     Diabetes Brother        Social History     Tobacco Use    Smoking status: Never Smoker    Smokeless tobacco: Never Used   Substance Use Topics    Alcohol use: Yes     Comment: occasional    Drug use: No       Current Outpatient Medications on File Prior to Visit   Medication Sig Dispense Refill    aspirin (ECOTRIN) 81 MG EC tablet Take 81 mg by mouth once daily.      losartan (COZAAR) 100 MG tablet Take 1 tablet (100 mg total) by mouth once daily. 30 tablet 6    multivitamin capsule Take 1 capsule by mouth once daily.      [DISCONTINUED] fluticasone propionate (FLONASE) 50 mcg/actuation nasal spray 1 spray  (50 mcg total) by Each Nostril route 2 (two) times daily. (Patient not taking: Reported on 4/8/2022) 16 g 11    [DISCONTINUED] losartan-hydrochlorothiazide 100-12.5 mg (HYZAAR) 100-12.5 mg Tab Take 1 tablet by mouth once daily. (Patient not taking: Reported on 4/8/2022) 90 tablet 3     No current facility-administered medications on file prior to visit.       Review of patient's allergies indicates:  No Known Allergies    Review of Systems   Constitutional: Negative for activity change, chills and fever.   HENT: Negative for congestion.    Respiratory: Negative for cough, chest tightness and shortness of breath.    Cardiovascular: Negative for chest pain and palpitations.   Gastrointestinal: Negative for abdominal distention, abdominal pain, nausea and vomiting.   Genitourinary: Negative for difficulty urinating, flank pain, hematuria, penile pain, scrotal swelling and testicular pain.   Musculoskeletal: Negative for gait problem.       Objective:      Physical Exam  Constitutional:       Appearance: Normal appearance.   HENT:      Head: Normocephalic.   Pulmonary:      Effort: Pulmonary effort is normal.      Breath sounds: Normal breath sounds.   Abdominal:      General: Abdomen is flat. Bowel sounds are normal.      Palpations: Abdomen is soft.      Tenderness: There is no abdominal tenderness. There is no right CVA tenderness, left CVA tenderness or guarding.   Genitourinary:     Penis: Normal.       Testes: Normal.      Prostate: Normal.   Musculoskeletal:         General: Normal range of motion.      Cervical back: Normal range of motion.   Skin:     General: Skin is warm.   Neurological:      Mental Status: He is alert.         Assessment:     Problem Noted   Elevated Psa 3/26/2013    H/o negative biopsy 2011, PSA 4.6           Plan:     1. Discussed nromal PSA for age/race <3.5.  He has h/o negative biopsy with rising PSA>  Recommend MRI prostate  2. Follow up to review    Discussion: The natural history of  prostate cancer and ongoing controversy regarding screening was discussed with the patient. The potential treatment outcomes of prostate cancer were explained. The meaning of a false positive PSA and a false negative PSA has been discussed. He understands that PSA is a screening blood test. If elevated, it could be due to an enlarged prostate, inflammation of the prostate, infection of the prostate, or prostate cancer.      Broderick Ybarra MD

## 2022-04-11 ENCOUNTER — TELEPHONE (OUTPATIENT)
Dept: UROLOGY | Facility: CLINIC | Age: 57
End: 2022-04-11
Payer: COMMERCIAL

## 2022-04-11 DIAGNOSIS — R97.20 ELEVATED PSA: Primary | ICD-10-CM

## 2022-04-11 NOTE — TELEPHONE ENCOUNTER
----- Message from Tierra Sutherland sent at 4/11/2022 11:27 AM CDT -----  Type:  Patient Returning Call    Who Called pt  Who Left Message for Patient:Lynne  Does the patient know what this is regarding?:  Would the patient rather a call back or a response via [facility]? call  Best Call Back Number:[phone] (M)   Additional Information:

## 2022-04-11 NOTE — TELEPHONE ENCOUNTER
Spoke with patient, he will not be able to proceed with MRI of prostate as it's costly.  He would like to repeat PSA in a few months instead.  Please advise.

## 2022-04-11 NOTE — TELEPHONE ENCOUNTER
----- Message from Vilma Braun sent at 4/11/2022  9:05 AM CDT -----  Regarding: Appt Inquiry  Type:  Needs Medical Advice    Who Called: pt  Would the patient rather a call back or a response via [facility]? call  Best Call Back Number: [phone]  Additional Information: appt inquiry

## 2022-06-24 ENCOUNTER — LAB VISIT (OUTPATIENT)
Dept: LAB | Facility: HOSPITAL | Age: 57
End: 2022-06-24
Attending: UROLOGY
Payer: COMMERCIAL

## 2022-06-24 DIAGNOSIS — R97.20 ELEVATED PSA: ICD-10-CM

## 2022-06-24 LAB — COMPLEXED PSA SERPL-MCNC: 3.6 NG/ML (ref 0–4)

## 2022-06-24 PROCEDURE — 36415 COLL VENOUS BLD VENIPUNCTURE: CPT | Performed by: UROLOGY

## 2022-06-24 PROCEDURE — 84153 ASSAY OF PSA TOTAL: CPT | Performed by: UROLOGY

## 2022-08-22 ENCOUNTER — TELEPHONE (OUTPATIENT)
Dept: INTERNAL MEDICINE | Facility: CLINIC | Age: 57
End: 2022-08-22
Payer: COMMERCIAL

## 2022-09-20 ENCOUNTER — OFFICE VISIT (OUTPATIENT)
Dept: INTERNAL MEDICINE | Facility: CLINIC | Age: 57
End: 2022-09-20
Payer: COMMERCIAL

## 2022-09-20 ENCOUNTER — HOSPITAL ENCOUNTER (OUTPATIENT)
Dept: RADIOLOGY | Facility: HOSPITAL | Age: 57
Discharge: HOME | End: 2022-09-20
Attending: STUDENT IN AN ORGANIZED HEALTH CARE EDUCATION/TRAINING PROGRAM
Payer: COMMERCIAL

## 2022-09-20 VITALS
WEIGHT: 196.5 LBS | HEART RATE: 69 BPM | DIASTOLIC BLOOD PRESSURE: 76 MMHG | SYSTOLIC BLOOD PRESSURE: 144 MMHG | BODY MASS INDEX: 33.73 KG/M2

## 2022-09-20 DIAGNOSIS — I10 PRIMARY HYPERTENSION: ICD-10-CM

## 2022-09-20 DIAGNOSIS — G89.29 CHRONIC RIGHT SHOULDER PAIN: ICD-10-CM

## 2022-09-20 DIAGNOSIS — K40.20 NON-RECURRENT BILATERAL INGUINAL HERNIA WITHOUT OBSTRUCTION OR GANGRENE: ICD-10-CM

## 2022-09-20 DIAGNOSIS — Z86.16 PERSONAL HISTORY OF COVID-19: ICD-10-CM

## 2022-09-20 DIAGNOSIS — I10 HYPERTENSION, ESSENTIAL: Primary | ICD-10-CM

## 2022-09-20 DIAGNOSIS — Z23 NEED FOR INFLUENZA VACCINATION: ICD-10-CM

## 2022-09-20 DIAGNOSIS — M65.312 TRIGGER FINGER OF LEFT THUMB: ICD-10-CM

## 2022-09-20 DIAGNOSIS — Z00.00 HEALTHCARE MAINTENANCE: ICD-10-CM

## 2022-09-20 DIAGNOSIS — U09.9 LONG COVID: ICD-10-CM

## 2022-09-20 DIAGNOSIS — M25.511 CHRONIC RIGHT SHOULDER PAIN: ICD-10-CM

## 2022-09-20 DIAGNOSIS — G47.33 OSA (OBSTRUCTIVE SLEEP APNEA): ICD-10-CM

## 2022-09-20 DIAGNOSIS — Z11.59 NEED FOR HEPATITIS C SCREENING TEST: ICD-10-CM

## 2022-09-20 PROCEDURE — 99215 OFFICE O/P EST HI 40 MIN: CPT | Mod: S$GLB,,, | Performed by: STUDENT IN AN ORGANIZED HEALTH CARE EDUCATION/TRAINING PROGRAM

## 2022-09-20 PROCEDURE — 71046 XR CHEST PA AND LATERAL: ICD-10-PCS | Mod: 26,,, | Performed by: RADIOLOGY

## 2022-09-20 PROCEDURE — 71046 X-RAY EXAM CHEST 2 VIEWS: CPT | Mod: 26,,, | Performed by: RADIOLOGY

## 2022-09-20 PROCEDURE — 99999 PR PBB SHADOW E&M-EST. PATIENT-LVL V: ICD-10-PCS | Mod: PBBFAC,,, | Performed by: STUDENT IN AN ORGANIZED HEALTH CARE EDUCATION/TRAINING PROGRAM

## 2022-09-20 PROCEDURE — 71046 X-RAY EXAM CHEST 2 VIEWS: CPT | Mod: TC

## 2022-09-20 PROCEDURE — 99999 PR PBB SHADOW E&M-EST. PATIENT-LVL V: CPT | Mod: PBBFAC,,, | Performed by: STUDENT IN AN ORGANIZED HEALTH CARE EDUCATION/TRAINING PROGRAM

## 2022-09-20 PROCEDURE — 99215 PR OFFICE/OUTPT VISIT, EST, LEVL V, 40-54 MIN: ICD-10-PCS | Mod: S$GLB,,, | Performed by: STUDENT IN AN ORGANIZED HEALTH CARE EDUCATION/TRAINING PROGRAM

## 2022-09-20 RX ORDER — LOSARTAN POTASSIUM 100 MG/1
100 TABLET ORAL DAILY
Qty: 90 TABLET | Refills: 3 | Status: SHIPPED | OUTPATIENT
Start: 2022-09-20 | End: 2023-03-16

## 2022-09-20 RX ORDER — ALBUTEROL SULFATE 90 UG/1
2 AEROSOL, METERED RESPIRATORY (INHALATION) EVERY 6 HOURS PRN
Qty: 1 G | Refills: 0 | Status: SHIPPED | OUTPATIENT
Start: 2022-09-20 | End: 2022-10-19

## 2022-09-20 NOTE — PROGRESS NOTES
"Subjective:       Patient ID: Jerome Almazan is a 56 y.o. male.    Chief Complaint: Cough    Cough      Patient is a 56 y.o. male , English speaking, with a history of:  HTN  LUCY  Carpal tunnel with h/o BL repair  R shoulder pain and "torned biceps"    That comes to the clinic for a follow visit.  Currently asymptomatic and has multiple complaints:  - Persistent dry cough, intermittently during the day,it lasts a few minutes, residual after having covid 3 months ago, with occasional wheezing. Patient denies SOB at rest or exertion, no fever or other symptoms.  - Left thumb pain and limited mobility, unable to flex the finger, patient feels that it hurts and burns. Patient is a  and had a trauma near the numb with a hammer 2 months ago.  -R shoulder pain:  3 week history of right shoulder pain,he works out and lifts weight, he states he might have over done it with the weights and since then he has had persistent pain to lift the arm and rotate the shoulder.  -Patient feels bulging mass in his left inguinal area and "pressure" but also on the right hand side.    Denies CV or constitutional symptoms.    Latest labs in February of 2022, WNL  PSA WNL    Healthcare Maintenance:  Colonoscopy: Nov 2013  Vaccinations: Tetanus 2016  COVID vaccination: completed  Depression screening: PHQ2 score = 0    ROS  11-point review of systems done. Negative except for detailed in the HPI.        Objective:      Physical Exam  Vitals and nursing note reviewed.   Constitutional:       Appearance: Normal appearance.   HENT:      Head: Normocephalic and atraumatic.      Right Ear: Tympanic membrane normal.      Left Ear: Tympanic membrane normal.      Nose: Nose normal.      Mouth/Throat:      Mouth: Mucous membranes are moist.      Pharynx: Oropharynx is clear.   Eyes:      Extraocular Movements: Extraocular movements intact.      Conjunctiva/sclera: Conjunctivae normal.      Pupils: Pupils are equal, round, and " "reactive to light.   Cardiovascular:      Rate and Rhythm: Normal rate and regular rhythm.      Pulses: Normal pulses.      Heart sounds: Normal heart sounds.   Pulmonary:      Effort: Pulmonary effort is normal.      Breath sounds: Normal breath sounds.   Abdominal:      General: Bowel sounds are normal. There is no distension.      Palpations: Abdomen is soft.      Tenderness: There is no abdominal tenderness.   Musculoskeletal:         General: Normal range of motion.        Arms:       Cervical back: Normal range of motion and neck supple.      Comments: R shoulder pain when lifting up, rotating shoulder and tenderness on palpation of acromio - clavicular joint.  Triggered left thumb, limited flexion, mild swelling.   Skin:     General: Skin is warm.   Neurological:      General: No focal deficit present.      Mental Status: He is alert and oriented to person, place, and time. Mental status is at baseline.   Psychiatric:         Mood and Affect: Mood normal.          Assessment:       Problem List Items Addressed This Visit          Cardiac/Vascular    Hypertension, essential - Primary    Relevant Orders    CBC Auto Differential    Hemoglobin A1C    Comprehensive Metabolic Panel    Lipid Panel    Urinalysis, Reflex to Urine Culture Urine, Clean Catch       ID    Personal history of COVID-19     Dry intermittent cough, persistent after COVID 3 months ago with occasional wheezing.  Will order XR  Will treat with antihistamine and rescue inhaler.         Long COVID     3-month h/o persistent cough after covid            Orthopedic    Right shoulder pain     3 w h/o R shoulder pain with rotation, abduction and extension of the shoulder  H/o R "torned" biceps.  Patient lifts weight          Trigger finger of left thumb     - Left thumb pain and limited mobility, unable to flex the finger, patient feels that it hurts and burns. Patient is a  and had a trauma near the numb with a hammer 2 months ago.  Will " refer to hand specialist.            Other    LUCY (obstructive sleep apnea)     Mild, on CPAP, stable         Healthcare maintenance    Relevant Orders    CBC Auto Differential    Hemoglobin A1C    Comprehensive Metabolic Panel    Lipid Panel    Urinalysis, Reflex to Urine Culture Urine, Clean Catch     Other Visit Diagnoses       Need for hepatitis C screening test        Relevant Orders    Hepatitis C antibody    Need for influenza vaccination                Plan:         - Referred to hand specialist and ortho  - Ordered XR chest and US abdomen limited  - Albuterol inhaler PRN  - Refilled BP medication    I spent a total of 40 minutes on the day of the visit.  This includes face to face time and non-face to face time preparing to see the patient (eg, review of tests), obtaining and/or reviewing separately obtained history, documenting clinical information in the electronic or other health record, independently interpreting results and communicating results to the patient/family/caregiver, or care coordinator.       Education provided  Lifestyle recommendations given  AVS printed, explained, and given to the patient.  RTC in : 2 weeks virtual visit to discuss XR and US results and then again in February for yearly examination.      FORREST JOSEPH MD, MPH  Internal Medicine  [facility]  [facility]

## 2022-09-20 NOTE — ASSESSMENT & PLAN NOTE
"3 w h/o R shoulder pain with rotation, abduction and extension of the shoulder  H/o R "torned" biceps.  Patient lifts weight   "

## 2022-09-20 NOTE — ASSESSMENT & PLAN NOTE
Controlled.  Will continue same management. Losartan 100 mg qd. Rx refilled.  Recommended to monitor blood pressure regularly, eat a well-balanced diet that's low in salt, DASH diet, enjoy regular physical activity, manage stress, maintain a healthy weight, take medications properly.

## 2022-09-20 NOTE — ASSESSMENT & PLAN NOTE
Dry intermittent cough, persistent after COVID 3 months ago with occasional wheezing.  Will order XR  Will treat with antihistamine and rescue inhaler.

## 2022-09-20 NOTE — ASSESSMENT & PLAN NOTE
- Left thumb pain and limited mobility, unable to flex the finger, patient feels that it hurts and burns. Patient is a  and had a trauma near the numb with a hammer 2 months ago.  H/o BL carpal tunnel repair  Will refer to hand specialist.

## 2022-10-03 ENCOUNTER — HOSPITAL ENCOUNTER (OUTPATIENT)
Dept: RADIOLOGY | Facility: HOSPITAL | Age: 57
Discharge: HOME | End: 2022-10-03
Attending: STUDENT IN AN ORGANIZED HEALTH CARE EDUCATION/TRAINING PROGRAM
Payer: COMMERCIAL

## 2022-10-03 DIAGNOSIS — K40.20 NON-RECURRENT BILATERAL INGUINAL HERNIA WITHOUT OBSTRUCTION OR GANGRENE: ICD-10-CM

## 2022-10-03 DIAGNOSIS — K40.90 LEFT INGUINAL HERNIA: Primary | ICD-10-CM

## 2022-10-03 PROCEDURE — 76705 ECHO EXAM OF ABDOMEN: CPT | Mod: TC

## 2022-10-03 PROCEDURE — 76705 ECHO EXAM OF ABDOMEN: CPT | Mod: 26,,, | Performed by: INTERNAL MEDICINE

## 2022-10-03 PROCEDURE — 76705 US ABDOMEN LIMITED_HERNIA: ICD-10-PCS | Mod: 26,,, | Performed by: INTERNAL MEDICINE

## 2022-10-07 ENCOUNTER — OFFICE VISIT (OUTPATIENT)
Dept: SURGERY | Facility: CLINIC | Age: 57
End: 2022-10-07
Payer: COMMERCIAL

## 2022-10-07 VITALS
SYSTOLIC BLOOD PRESSURE: 137 MMHG | DIASTOLIC BLOOD PRESSURE: 88 MMHG | WEIGHT: 192.81 LBS | HEIGHT: 64 IN | HEART RATE: 69 BPM | BODY MASS INDEX: 32.92 KG/M2

## 2022-10-07 DIAGNOSIS — K40.90 LEFT INGUINAL HERNIA: ICD-10-CM

## 2022-10-07 PROCEDURE — 99999 PR PBB SHADOW E&M-EST. PATIENT-LVL IV: ICD-10-PCS | Mod: PBBFAC,,, | Performed by: SURGERY

## 2022-10-07 PROCEDURE — 99203 OFFICE O/P NEW LOW 30 MIN: CPT | Mod: S$GLB,,, | Performed by: SURGERY

## 2022-10-07 PROCEDURE — 99999 PR PBB SHADOW E&M-EST. PATIENT-LVL IV: CPT | Mod: PBBFAC,,, | Performed by: SURGERY

## 2022-10-07 PROCEDURE — 99203 PR OFFICE/OUTPT VISIT, NEW, LEVL III, 30-44 MIN: ICD-10-PCS | Mod: S$GLB,,, | Performed by: SURGERY

## 2022-10-07 NOTE — PROGRESS NOTES
General Surgery Office Visit   History and Physical    Patient Name: Jerome Almazan  YOB: 1965 (56 y.o.)  MRN: [id number]  Today's Date: 10/07/2022    Referring Md:   Farzana Harris Md  [address] [place] [address]    SUBJECTIVE:     Chief Complaint: Inguinal hernia    History of Present Illness:  Jerome Almazan is a 56 y.o. male with past medical history of HTN presenting to clinic for evaluation and recommendations regarding bilateral inguinal hernias. He states that for a few weeks he has noted bulges in his groin bilaterally. He underwent US on 10/3, which demonstrated a small sub-centimeter left-sided inguinal hernia, but no hernia was seen on the right side. He denies any symptoms associated with the bulges, including no pain. No prior abdominal surgeries. No chronic cough or prostatism. He exercises and lifts weights regularly.    Review of patient's allergies indicates:  No Known Allergies  Past Medical History:   Diagnosis Date    Abnormal PSA 4/5/2013    Hypertension      Past Surgical History:   Procedure Laterality Date    CARPAL TUNNEL RELEASE      SHOULDER SURGERY       Family History   Problem Relation Age of Onset    Heart disease Mother     Heart disease Father     Diabetes Brother      Social History     Tobacco Use    Smoking status: Never    Smokeless tobacco: Never   Substance Use Topics    Alcohol use: Yes     Comment: occasional    Drug use: No        Review of Systems:  Review of Systems   Constitutional:  Negative for chills and fever.   Respiratory:  Negative for cough and shortness of breath.    Cardiovascular:  Negative for chest pain and palpitations.   Gastrointestinal:  Negative for abdominal pain, nausea and vomiting.   Genitourinary:  Negative for dysuria.   Neurological:  Negative for dizziness and headaches.     OBJECTIVE:     Vital Signs (Most Recent)  /88 (BP Location: Left arm, Patient Position: Sitting, BP Method: Medium  "(Automatic))   Pulse 69   Ht 5' 4" (1.626 m)   Wt 87.5 kg (192 lb 12.7 oz)   BMI 33.09 kg/m²     Physical Exam:  Physical Exam  Vitals reviewed.   Constitutional:       Appearance: Normal appearance.   Cardiovascular:      Rate and Rhythm: Normal rate.   Pulmonary:      Effort: Pulmonary effort is normal.   Abdominal:      Palpations: Abdomen is soft.      Tenderness: There is no abdominal tenderness.      Comments: Small bilateral groin bulges noted. No change with valsalva. No defect appreciated.   Skin:     General: Skin is warm and dry.   Neurological:      General: No focal deficit present.      Mental Status: He is alert and oriented to person, place, and time.         ASSESSMENT/PLAN:     Jerome Almazan is a 56 y.o. male presenting for evaluation of potential inguinal hernias. US demonstrated a small left-sided inguinal hernia, but nothing on the right despite small bilateral bulges being present on physical exam. CT scan from 2019 did not demonstrate any defects. We discussed the indications for surgery, and that while he remains asymptomatic and otherwise healthy, we can defer surgery at this time.    - Recommend follow up as needed if symptoms develop in the future  - Otherwise continue regular activity and exercise without restriction    Mike Erazo MD  [facility]    Pt seen and examined.  Agree with note and plan.    Reno Cox M.D., F.A.C.S.  [id number]  [facility] [place] [facility] [place]      "

## 2022-10-10 ENCOUNTER — OFFICE VISIT (OUTPATIENT)
Dept: INTERNAL MEDICINE | Facility: CLINIC | Age: 57
End: 2022-10-10
Payer: COMMERCIAL

## 2022-10-10 DIAGNOSIS — Z00.00 ROUTINE GENERAL MEDICAL EXAMINATION AT A HEALTH CARE FACILITY: Primary | ICD-10-CM

## 2022-10-10 DIAGNOSIS — I10 HYPERTENSION, ESSENTIAL: ICD-10-CM

## 2022-10-10 DIAGNOSIS — K40.20 NON-RECURRENT BILATERAL INGUINAL HERNIA WITHOUT OBSTRUCTION OR GANGRENE: ICD-10-CM

## 2022-10-10 PROCEDURE — 99213 PR OFFICE/OUTPT VISIT, EST, LEVL III, 20-29 MIN: ICD-10-PCS | Mod: 95,,, | Performed by: STUDENT IN AN ORGANIZED HEALTH CARE EDUCATION/TRAINING PROGRAM

## 2022-10-10 PROCEDURE — 99213 OFFICE O/P EST LOW 20 MIN: CPT | Mod: 95,,, | Performed by: STUDENT IN AN ORGANIZED HEALTH CARE EDUCATION/TRAINING PROGRAM

## 2022-10-10 NOTE — PROGRESS NOTES
"Subjective:       Patient ID: Jerome Almazan is a 56 y.o. male.    Chief Complaint: No chief complaint on file.    The patient location is: [place]  The chief complaint leading to consultation is: follow up    Visit type: audio only    Face to Face time with patient: 20  20 minutes of total time spent on the encounter, which includes face to face time and non-face to face time preparing to see the patient (eg, review of tests), Obtaining and/or reviewing separately obtained history, Documenting clinical information in the electronic or other health record, Independently interpreting results (not separately reported) and communicating results to the patient/family/caregiver, or Care coordination (not separately reported).         Each patient to whom he or she provides medical services by telemedicine is:  (1) informed of the relationship between the physician and patient and the respective role of any other health care provider with respect to management of the patient; and (2) notified that he or she may decline to receive medical services by telemedicine and may withdraw from such care at any time.    Notes:         HPI    Patient is a 56 y.o. male , English speaking, with a history of:  HTN  LUCY  Carpal tunnel with h/o BL repair  R shoulder pain and "torned biceps"    That comes to the clinic for a follow visit.  Cough improved  Shoulder pain improved  Thumb pain: has pending appointment with specialist.  Hernia: US showed small opening of the left inguinal ligament. Evaluated by surgery, non surgical for now.      No other complaints.    Denies CV or constitutional symptoms.     Latest labs in February of 2022, WNL  PSA WNL     Healthcare Maintenance:  Colonoscopy: Nov 2013  Vaccinations: Tetanus 2016  COVID vaccination: completed  Depression screening: PHQ2 score = 0      ROS  11-point review of systems done. Negative except for detailed in the HPI.      Objective:      Not done  This was a " telephone encounter.      Assessment:       Problem List Items Addressed This Visit          Cardiac/Vascular    Hypertension, essential     Controlled.  Will continue same management.  Recommended to monitor blood pressure regularly, eat a well-balanced diet that's low in salt, DASH diet, enjoy regular physical activity, manage stress, maintain a healthy weight, take medications properly.              GI    Non-recurrent bilateral inguinal hernia without obstruction or gangrene     Small opening of left sided inguinal ligament.  Non surgical  Will watch for now.            Other    Routine general medical examination at a health care facility - Primary     Doing well, all symptoms improved.         Relevant Orders    TSH       Plan:         - f/u in February for AWV, labs already ordered    Education provided  Lifestyle recommendations given  AVS printed, explained, and given to the patient.  RTC in : 4 months      FORREST JOSEPH MD, MPH  Internal Medicine  [facility]  [facility]

## 2022-10-10 NOTE — ASSESSMENT & PLAN NOTE
Controlled.  Will continue same management.  Recommended to monitor blood pressure regularly, eat a well-balanced diet that's low in salt, DASH diet, enjoy regular physical activity, manage stress, maintain a healthy weight, take medications properly.

## 2022-11-07 ENCOUNTER — PATIENT MESSAGE (OUTPATIENT)
Dept: ORTHOPEDICS | Facility: CLINIC | Age: 57
End: 2022-11-07
Payer: COMMERCIAL

## 2022-11-07 DIAGNOSIS — M79.641 BILATERAL HAND PAIN: Primary | ICD-10-CM

## 2022-11-07 DIAGNOSIS — M79.642 BILATERAL HAND PAIN: Primary | ICD-10-CM

## 2022-12-26 PROBLEM — Z00.00 HEALTHCARE MAINTENANCE: Status: RESOLVED | Noted: 2022-09-20 | Resolved: 2022-12-26

## 2023-01-09 PROBLEM — Z00.00 ROUTINE GENERAL MEDICAL EXAMINATION AT A HEALTH CARE FACILITY: Status: RESOLVED | Noted: 2022-10-10 | Resolved: 2023-01-09

## 2023-01-31 ENCOUNTER — PATIENT MESSAGE (OUTPATIENT)
Dept: INTERNAL MEDICINE | Facility: CLINIC | Age: 58
End: 2023-01-31
Payer: COMMERCIAL

## 2023-04-06 ENCOUNTER — DOCUMENTATION ONLY (OUTPATIENT)
Dept: INTERNAL MEDICINE | Facility: CLINIC | Age: 58
End: 2023-04-06
Payer: COMMERCIAL

## 2023-04-06 NOTE — PROGRESS NOTES
PT called in & informed us that the Losartan he is taking is  The cause for his erectile dysfuction. Pt requested Viagra . This medication was sent.

## 2023-07-13 DIAGNOSIS — Z00.00 ROUTINE MEDICAL EXAM: Primary | ICD-10-CM

## 2023-09-22 ENCOUNTER — OFFICE VISIT (OUTPATIENT)
Dept: INTERNAL MEDICINE | Facility: CLINIC | Age: 58
End: 2023-09-22
Payer: COMMERCIAL

## 2023-09-22 ENCOUNTER — CLINICAL SUPPORT (OUTPATIENT)
Dept: INTERNAL MEDICINE | Facility: CLINIC | Age: 58
End: 2023-09-22
Payer: COMMERCIAL

## 2023-09-22 ENCOUNTER — HOSPITAL ENCOUNTER (OUTPATIENT)
Dept: CARDIOLOGY | Facility: CLINIC | Age: 58
Discharge: HOME | End: 2023-09-22
Payer: COMMERCIAL

## 2023-09-22 VITALS
HEIGHT: 64 IN | BODY MASS INDEX: 31.77 KG/M2 | WEIGHT: 186.06 LBS | DIASTOLIC BLOOD PRESSURE: 81 MMHG | SYSTOLIC BLOOD PRESSURE: 139 MMHG

## 2023-09-22 DIAGNOSIS — Z00.00 HEALTHCARE MAINTENANCE: ICD-10-CM

## 2023-09-22 DIAGNOSIS — Z00.00 ROUTINE GENERAL MEDICAL EXAMINATION AT A HEALTH CARE FACILITY: Primary | ICD-10-CM

## 2023-09-22 DIAGNOSIS — G56.01 RIGHT CARPAL TUNNEL SYNDROME: ICD-10-CM

## 2023-09-22 DIAGNOSIS — D22.9 BENIGN NEVUS OF SKIN: ICD-10-CM

## 2023-09-22 DIAGNOSIS — G47.33 OSA (OBSTRUCTIVE SLEEP APNEA): ICD-10-CM

## 2023-09-22 DIAGNOSIS — R97.20 ELEVATED PSA: ICD-10-CM

## 2023-09-22 DIAGNOSIS — Z00.00 ROUTINE MEDICAL EXAM: ICD-10-CM

## 2023-09-22 DIAGNOSIS — R79.89 ELEVATED SERUM CREATININE: ICD-10-CM

## 2023-09-22 DIAGNOSIS — I10 HYPERTENSION, ESSENTIAL: ICD-10-CM

## 2023-09-22 PROBLEM — E66.09 CLASS 1 OBESITY DUE TO EXCESS CALORIES WITHOUT SERIOUS COMORBIDITY WITH BODY MASS INDEX (BMI) OF 31.0 TO 31.9 IN ADULT: Status: ACTIVE | Noted: 2023-09-22

## 2023-09-22 PROBLEM — E66.811 CLASS 1 OBESITY DUE TO EXCESS CALORIES WITHOUT SERIOUS COMORBIDITY WITH BODY MASS INDEX (BMI) OF 31.0 TO 31.9 IN ADULT: Status: ACTIVE | Noted: 2023-09-22

## 2023-09-22 PROBLEM — E66.811 CLASS 1 OBESITY DUE TO EXCESS CALORIES WITHOUT SERIOUS COMORBIDITY WITH BODY MASS INDEX (BMI) OF 31.0 TO 31.9 IN ADULT: Status: RESOLVED | Noted: 2023-09-22 | Resolved: 2023-09-22

## 2023-09-22 PROBLEM — E66.09 CLASS 1 OBESITY DUE TO EXCESS CALORIES WITHOUT SERIOUS COMORBIDITY WITH BODY MASS INDEX (BMI) OF 31.0 TO 31.9 IN ADULT: Status: RESOLVED | Noted: 2023-09-22 | Resolved: 2023-09-22

## 2023-09-22 PROBLEM — M65.312 TRIGGER FINGER OF LEFT THUMB: Status: RESOLVED | Noted: 2022-09-20 | Resolved: 2023-09-22

## 2023-09-22 LAB
ALBUMIN SERPL BCP-MCNC: 4.3 G/DL (ref 3.5–5.2)
ALP SERPL-CCNC: 54 U/L (ref 55–135)
ALT SERPL W/O P-5'-P-CCNC: 30 U/L (ref 10–44)
ANION GAP SERPL CALC-SCNC: 6 MMOL/L (ref 8–16)
AST SERPL-CCNC: 22 U/L (ref 10–40)
BILIRUB SERPL-MCNC: 0.6 MG/DL (ref 0.1–1)
BUN SERPL-MCNC: 18 MG/DL (ref 6–20)
CALCIUM SERPL-MCNC: 9.9 MG/DL (ref 8.7–10.5)
CHLORIDE SERPL-SCNC: 107 MMOL/L (ref 95–110)
CHOLEST SERPL-MCNC: 157 MG/DL (ref 120–199)
CHOLEST/HDLC SERPL: 3.9 {RATIO} (ref 2–5)
CO2 SERPL-SCNC: 27 MMOL/L (ref 23–29)
COMPLEXED PSA SERPL-MCNC: 4.8 NG/ML (ref 0–4)
CREAT SERPL-MCNC: 1.4 MG/DL (ref 0.5–1.4)
ERYTHROCYTE [DISTWIDTH] IN BLOOD BY AUTOMATED COUNT: 13 % (ref 11.5–14.5)
EST. GFR  (NO RACE VARIABLE): 57.9 ML/MIN/1.73 M^2
ESTIMATED AVG GLUCOSE: 111 MG/DL (ref 68–131)
GLUCOSE SERPL-MCNC: 107 MG/DL (ref 70–110)
HBA1C MFR BLD: 5.5 % (ref 4–5.6)
HCT VFR BLD AUTO: 44.3 % (ref 40–54)
HCV AB SERPL QL IA: NORMAL
HDLC SERPL-MCNC: 40 MG/DL (ref 40–75)
HDLC SERPL: 25.5 % (ref 20–50)
HGB BLD-MCNC: 14.8 G/DL (ref 14–18)
LDLC SERPL CALC-MCNC: 100 MG/DL (ref 63–159)
MCH RBC QN AUTO: 30.7 PG (ref 27–31)
MCHC RBC AUTO-ENTMCNC: 33.4 G/DL (ref 32–36)
MCV RBC AUTO: 92 FL (ref 82–98)
NONHDLC SERPL-MCNC: 117 MG/DL
PLATELET # BLD AUTO: 263 K/UL (ref 150–450)
PMV BLD AUTO: 9.8 FL (ref 9.2–12.9)
POTASSIUM SERPL-SCNC: 4.8 MMOL/L (ref 3.5–5.1)
PROT SERPL-MCNC: 7.5 G/DL (ref 6–8.4)
RBC # BLD AUTO: 4.82 M/UL (ref 4.6–6.2)
SODIUM SERPL-SCNC: 140 MMOL/L (ref 136–145)
TRIGL SERPL-MCNC: 85 MG/DL (ref 30–150)
TSH SERPL DL<=0.005 MIU/L-ACNC: 0.61 UIU/ML (ref 0.4–4)
WBC # BLD AUTO: 6.62 K/UL (ref 3.9–12.7)

## 2023-09-22 PROCEDURE — 99396 PREV VISIT EST AGE 40-64: CPT | Mod: S$GLB,,, | Performed by: STUDENT IN AN ORGANIZED HEALTH CARE EDUCATION/TRAINING PROGRAM

## 2023-09-22 PROCEDURE — 99396 PR PREVENTIVE VISIT,EST,40-64: ICD-10-PCS | Mod: S$GLB,,, | Performed by: STUDENT IN AN ORGANIZED HEALTH CARE EDUCATION/TRAINING PROGRAM

## 2023-09-22 PROCEDURE — 93005 ELECTROCARDIOGRAM TRACING: CPT | Mod: S$GLB,,, | Performed by: STUDENT IN AN ORGANIZED HEALTH CARE EDUCATION/TRAINING PROGRAM

## 2023-09-22 PROCEDURE — 85027 COMPLETE CBC AUTOMATED: CPT | Performed by: STUDENT IN AN ORGANIZED HEALTH CARE EDUCATION/TRAINING PROGRAM

## 2023-09-22 PROCEDURE — 80053 COMPREHEN METABOLIC PANEL: CPT | Performed by: STUDENT IN AN ORGANIZED HEALTH CARE EDUCATION/TRAINING PROGRAM

## 2023-09-22 PROCEDURE — 93010 EKG 12-LEAD: ICD-10-PCS | Mod: S$GLB,,, | Performed by: INTERNAL MEDICINE

## 2023-09-22 PROCEDURE — 99999 PR PBB SHADOW E&M-EST. PATIENT-LVL IV: CPT | Mod: PBBFAC,,, | Performed by: STUDENT IN AN ORGANIZED HEALTH CARE EDUCATION/TRAINING PROGRAM

## 2023-09-22 PROCEDURE — 84443 ASSAY THYROID STIM HORMONE: CPT | Performed by: STUDENT IN AN ORGANIZED HEALTH CARE EDUCATION/TRAINING PROGRAM

## 2023-09-22 PROCEDURE — 99999 PR PBB SHADOW E&M-EST. PATIENT-LVL IV: ICD-10-PCS | Mod: PBBFAC,,, | Performed by: STUDENT IN AN ORGANIZED HEALTH CARE EDUCATION/TRAINING PROGRAM

## 2023-09-22 PROCEDURE — 93010 ELECTROCARDIOGRAM REPORT: CPT | Mod: S$GLB,,, | Performed by: INTERNAL MEDICINE

## 2023-09-22 PROCEDURE — 80061 LIPID PANEL: CPT | Performed by: STUDENT IN AN ORGANIZED HEALTH CARE EDUCATION/TRAINING PROGRAM

## 2023-09-22 PROCEDURE — 84153 ASSAY OF PSA TOTAL: CPT | Performed by: STUDENT IN AN ORGANIZED HEALTH CARE EDUCATION/TRAINING PROGRAM

## 2023-09-22 PROCEDURE — 93005 EKG 12-LEAD: ICD-10-PCS | Mod: S$GLB,,, | Performed by: STUDENT IN AN ORGANIZED HEALTH CARE EDUCATION/TRAINING PROGRAM

## 2023-09-22 PROCEDURE — 36415 COLL VENOUS BLD VENIPUNCTURE: CPT | Performed by: STUDENT IN AN ORGANIZED HEALTH CARE EDUCATION/TRAINING PROGRAM

## 2023-09-22 PROCEDURE — 86803 HEPATITIS C AB TEST: CPT | Performed by: STUDENT IN AN ORGANIZED HEALTH CARE EDUCATION/TRAINING PROGRAM

## 2023-09-22 PROCEDURE — 83036 HEMOGLOBIN GLYCOSYLATED A1C: CPT | Performed by: STUDENT IN AN ORGANIZED HEALTH CARE EDUCATION/TRAINING PROGRAM

## 2023-09-22 NOTE — PROGRESS NOTES
"Subjective:       Patient ID: Jerome Almazan is a 57 y.o. male.    Chief Complaint: [facility]    HPI    Jerome Almazan is a 57 y.o. male , English speaking, with a history of:  HTN  LUCY  Carpal tunnel with h/o BL repair  R shoulder pain and "torned biceps"  LUCY on CPAP      [Local Patient]  Originally from [facility]  Lives in: [facility] [id number]       Patient comes to the clinic for his annual physical exam through [facility]    Patient is currently asymptomatic and has no complaints.  Patient denies CV symptoms, CP, SOB, palpitations.  Patient denies constitutional symptoms, fever, changes in the urine or stool.    He continues to train at the gym every day, he has gained weight due to his weight training.    Blood pressure has been controlled.    Hernia is the same it hasn't grown.    Right hand carpal tunnel symptoms have remained stable.    Cough is gone.    No other complaints or concerns.    Hasn't had eye exam in 2 years, and he has noticed his eyesight has decreased.    Since last seen by me:      10/10/23 - Virtual F/U cough - improved  9/20/22 - AWV - establish care    Changes in health or medications: No    Specialists visits and recommendations:       H/o ER visits:   NO    H/o Hospitalizations:  NO    H/o falls: None     Life events / lifestyle:   Nothing new      Most recent laboratories reviewed:    Recent Labs   Lab 05/06/21  0836 02/11/22  0753 09/22/23  0824   WBC 8.07 7.42 6.62   Hemoglobin 14.4 14.7 14.8   Hematocrit 42.3 43.7 44.3   MCV 88 91 92   Platelets 286 266 263       Recent Labs   Lab 05/06/21  0836 02/11/22  0753 04/08/22  1036 09/22/23  0824   Glucose 102 103 94 107   Sodium 140 142 141 140   Potassium 4.7 4.0 4.3 4.8   BUN 23 H 20 23 H 18   Creatinine 1.2 1.1 1.1 1.4   eGFR if non African American >60.0 >60.0 >60  --    Total Bilirubin 0.5 0.7  --  0.6   AST 25 29  --  22   ALT 34 49 H  --  30       Recent Labs   Lab 09/22/23  0824   Hemoglobin A1C 5.5 " "      Recent Labs   Lab 05/06/21  0836 02/11/22  0753 09/22/23  0824   Cholesterol 151 168 157   Triglycerides 134 105 85   HDL 37 L 42 40   LDL Cholesterol 87.2 105.0 100.0   Non-HDL Cholesterol 114 126 117       Recent Labs   Lab 06/04/21  0714 02/11/22  0753 09/22/23  0824   TSH  --   --  0.606   PSA, Screen 3.7 4.3 H 4.8 H       Recent Labs   Lab 09/22/23  0824   Hepatitis C Ab Non-reactive         Current medications:    Current Outpatient Medications:     aspirin (ECOTRIN) 81 MG EC tablet, Take 81 mg by mouth once daily., Disp: , Rfl:     losartan (COZAAR) 100 MG tablet, Take 1 tablet (100 mg total) by mouth once daily., Disp: 90 tablet, Rfl: 3    multivitamin capsule, Take 1 capsule by mouth once daily., Disp: , Rfl:     SAW PALMETTO ORAL, Take 5,300 mg by mouth once daily., Disp: , Rfl:     sildenafiL (VIAGRA) 25 MG tablet, Take 1 tablet (25 mg total) by mouth daily as needed for Erectile Dysfunction (DAILY PRN)., Disp: 30 tablet, Rfl: 3      Healthcare Maintenance:  Colon cancer screening:   Last Colonoscopy completed on 11/15/2013   Vaccinations:        Tetanus: 2016       Pneumonia: N/A       Zoster: 2021       Influenza: no       COVID vaccination: UNVACCINATED    Depression screening: PHQ2 score = 0    Annual Wellness visit approx. Month: September ROS  11-point review of systems done. Negative except for detailed in the HPI.        Objective:      /81   Ht 5' 4" (1.626 m)   Wt 84.4 kg (186 lb 1.1 oz)   BMI 31.94 kg/m²     Physical Exam   Physical Exam  Vitals and nursing note reviewed.   Constitutional:       Appearance: Normal appearance.   HENT:      Head: Normocephalic and atraumatic.      Right Ear: Tympanic membrane normal.      Left Ear: Tympanic membrane normal.      Nose: Nose normal.      Mouth/Throat:      Mouth: Mucous membranes are moist.      Pharynx: Oropharynx is clear.   Eyes:      Extraocular Movements: Extraocular movements intact.      Conjunctiva/sclera: Conjunctivae " normal.      Pupils: Pupils are equal, round, and reactive to light.   Cardiovascular:      Rate and Rhythm: Normal rate and regular rhythm.      Pulses: Normal pulses.      Heart sounds: Normal heart sounds.   Pulmonary:      Effort: Pulmonary effort is normal.      Breath sounds: Normal breath sounds.   Abdominal:      General: Bowel sounds are normal. There is no distension.      Palpations: Abdomen is soft.      Tenderness: There is no abdominal tenderness.   Musculoskeletal:         General: Normal range of motion.      Cervical back: Normal range of motion and neck supple.   Skin:     General: Skin is warm.   R thigh hypopigmented nevus 1.5 cm diameter  Neurological:      General: No focal deficit present.      Mental Status: She is alert and oriented to person, place, and time. Mental status is at baseline.   Psychiatric:         Mood and Affect: Mood normal.           Assessment:       1. Routine general medical examination at a health care facility  Assessment & Plan:  Patient is in overall good general health.  Stable medical conditions listed on the PMH.  Labs reviewed and patient notified.        2. Hypertension, essential  Assessment & Plan:  Controlled.  Will continue same management:  Losartan 100 mg qd    Recommended to monitor blood pressure regularly, eat a well-balanced diet that's low in salt, DASH diet, enjoy regular physical activity, manage stress, maintain a healthy weight, take medications properly.          3. Elevated PSA  Overview:  H/o negative biopsy 2011, PSA 4.6    Assessment & Plan:  PSA, Screen (ng/mL)   Date Value   09/22/2023 4.8 (H)     F/u with Urology, will refer      4. Body mass index (BMI) of 31.0 to 31.9 in adult  Assessment & Plan:  Likely 2/2 weight lifting and resistance training  Recommended to watch body fat percentage and include fruits and vegetables in his diet.      5. Right carpal tunnel syndrome  Overview:  H/o surgical release    Assessment &  Plan:  Stable  Monitoring symptoms, no PT needed      6. LUCY (obstructive sleep apnea)  Assessment & Plan:  Mild, stable on CPAP  Continue  Patient is adherent      7. Benign nevus of skin  Assessment & Plan:  R thigh hypopigmented nevus 1.5 cm diameter  Unchanged      8. Elevated serum creatinine  Comments:  Likely 2/2 NSAID use, will repeat BMP in 2 weeks.  Orders:  -     BASIC METABOLIC PANEL; Future; Expected date: 09/22/2023    9. Healthcare maintenance  Assessment & Plan:  Health care maintenance and core gaps reviewed and assessed with patient.  Vaccinations recommended:        Tetanus - 2016       Shingles - N/A       Influenza - N/A       Pneumonia - N/A  Colon cancer screening:   Colonoscopy: O  Lifestyle recommendations given.  Physical activity recommended.    Legend: Ordered (O), Declined (D), Current (C)      Orders:  -     Ambulatory referral/consult to Endo Procedure ; Future; Expected date: 09/23/2023       Plan:       Referral to Urology for elevated PSA  Will repeat BMP for decreased GFR      Patient Instructions   Recommended to watch body fat percentage and include fruits and vegetables in his diet.     Problem list updated, medications reconciled, education provided  Lifestyle recommendations given  AVS printed, explained, and given to the patient.  RTC in : 1 year for annual wellness visit, labs not ordered yet               FORREST JOSEPH MD, MPH  Internal Medicine  [facility]  [facility]

## 2023-09-22 NOTE — ASSESSMENT & PLAN NOTE
Health care maintenance and core gaps reviewed and assessed with patient.  Vaccinations recommended:        Tetanus - 2016       Shingles - N/A       Influenza - N/A       Pneumonia - N/A  Colon cancer screening:   Colonoscopy: O  Lifestyle recommendations given.  Physical activity recommended.    Legend: Ordered (O), Declined (D), Current (C)

## 2023-09-22 NOTE — ASSESSMENT & PLAN NOTE
Likely 2/2 weight lifting and resistance training  Recommended to watch body fat percentage and include fruits and vegetables in his diet.

## 2023-09-22 NOTE — ASSESSMENT & PLAN NOTE
Controlled.  Will continue same management:  Losartan 100 mg qd    Recommended to monitor blood pressure regularly, eat a well-balanced diet that's low in salt, DASH diet, enjoy regular physical activity, manage stress, maintain a healthy weight, take medications properly.

## 2023-10-03 DIAGNOSIS — R97.20 ELEVATED PSA: Primary | ICD-10-CM

## 2023-10-06 ENCOUNTER — LAB VISIT (OUTPATIENT)
Dept: LAB | Facility: HOSPITAL | Age: 58
End: 2023-10-06
Payer: COMMERCIAL

## 2023-10-06 DIAGNOSIS — R79.89 ELEVATED SERUM CREATININE: ICD-10-CM

## 2023-10-06 DIAGNOSIS — R97.20 ELEVATED PSA: ICD-10-CM

## 2023-10-06 LAB
ANION GAP SERPL CALC-SCNC: 13 MMOL/L (ref 8–16)
BUN SERPL-MCNC: 22 MG/DL (ref 6–20)
CALCIUM SERPL-MCNC: 10 MG/DL (ref 8.7–10.5)
CHLORIDE SERPL-SCNC: 106 MMOL/L (ref 95–110)
CO2 SERPL-SCNC: 23 MMOL/L (ref 23–29)
COMPLEXED PSA SERPL-MCNC: 3.2 NG/ML (ref 0–4)
CREAT SERPL-MCNC: 1.5 MG/DL (ref 0.5–1.4)
EST. GFR  (NO RACE VARIABLE): 53 ML/MIN/1.73 M^2
GLUCOSE SERPL-MCNC: 94 MG/DL (ref 70–110)
POTASSIUM SERPL-SCNC: 4.4 MMOL/L (ref 3.5–5.1)
SODIUM SERPL-SCNC: 142 MMOL/L (ref 136–145)

## 2023-10-06 PROCEDURE — 80048 BASIC METABOLIC PNL TOTAL CA: CPT | Performed by: STUDENT IN AN ORGANIZED HEALTH CARE EDUCATION/TRAINING PROGRAM

## 2023-10-06 PROCEDURE — 84153 ASSAY OF PSA TOTAL: CPT | Performed by: STUDENT IN AN ORGANIZED HEALTH CARE EDUCATION/TRAINING PROGRAM

## 2023-10-06 PROCEDURE — 36415 COLL VENOUS BLD VENIPUNCTURE: CPT | Performed by: STUDENT IN AN ORGANIZED HEALTH CARE EDUCATION/TRAINING PROGRAM

## 2023-10-10 ENCOUNTER — OFFICE VISIT (OUTPATIENT)
Dept: INTERNAL MEDICINE | Facility: CLINIC | Age: 58
End: 2023-10-10
Payer: COMMERCIAL

## 2023-10-10 ENCOUNTER — LAB VISIT (OUTPATIENT)
Dept: LAB | Facility: HOSPITAL | Age: 58
End: 2023-10-10
Attending: STUDENT IN AN ORGANIZED HEALTH CARE EDUCATION/TRAINING PROGRAM
Payer: COMMERCIAL

## 2023-10-10 VITALS
HEART RATE: 72 BPM | BODY MASS INDEX: 32.97 KG/M2 | SYSTOLIC BLOOD PRESSURE: 164 MMHG | WEIGHT: 193.13 LBS | DIASTOLIC BLOOD PRESSURE: 89 MMHG | HEIGHT: 64 IN

## 2023-10-10 DIAGNOSIS — R79.89 ELEVATED SERUM CREATININE: ICD-10-CM

## 2023-10-10 DIAGNOSIS — R97.20 ELEVATED PSA: ICD-10-CM

## 2023-10-10 DIAGNOSIS — I10 HYPERTENSION, ESSENTIAL: ICD-10-CM

## 2023-10-10 DIAGNOSIS — R79.89 ELEVATED SERUM CREATININE: Primary | ICD-10-CM

## 2023-10-10 LAB
ANION GAP SERPL CALC-SCNC: 5 MMOL/L (ref 8–16)
BUN SERPL-MCNC: 16 MG/DL (ref 6–20)
CALCIUM SERPL-MCNC: 9.8 MG/DL (ref 8.7–10.5)
CHLORIDE SERPL-SCNC: 105 MMOL/L (ref 95–110)
CO2 SERPL-SCNC: 27 MMOL/L (ref 23–29)
CREAT SERPL-MCNC: 1.3 MG/DL (ref 0.5–1.4)
EST. GFR  (NO RACE VARIABLE): >60 ML/MIN/1.73 M^2
GLUCOSE SERPL-MCNC: 88 MG/DL (ref 70–110)
POTASSIUM SERPL-SCNC: 4.2 MMOL/L (ref 3.5–5.1)
SODIUM SERPL-SCNC: 137 MMOL/L (ref 136–145)

## 2023-10-10 PROCEDURE — 99213 OFFICE O/P EST LOW 20 MIN: CPT | Mod: S$GLB,,, | Performed by: STUDENT IN AN ORGANIZED HEALTH CARE EDUCATION/TRAINING PROGRAM

## 2023-10-10 PROCEDURE — 99999 PR PBB SHADOW E&M-EST. PATIENT-LVL IV: CPT | Mod: PBBFAC,,, | Performed by: STUDENT IN AN ORGANIZED HEALTH CARE EDUCATION/TRAINING PROGRAM

## 2023-10-10 PROCEDURE — 36415 COLL VENOUS BLD VENIPUNCTURE: CPT | Performed by: STUDENT IN AN ORGANIZED HEALTH CARE EDUCATION/TRAINING PROGRAM

## 2023-10-10 PROCEDURE — 99999 PR PBB SHADOW E&M-EST. PATIENT-LVL IV: ICD-10-PCS | Mod: PBBFAC,,, | Performed by: STUDENT IN AN ORGANIZED HEALTH CARE EDUCATION/TRAINING PROGRAM

## 2023-10-10 PROCEDURE — 99213 PR OFFICE/OUTPT VISIT, EST, LEVL III, 20-29 MIN: ICD-10-PCS | Mod: S$GLB,,, | Performed by: STUDENT IN AN ORGANIZED HEALTH CARE EDUCATION/TRAINING PROGRAM

## 2023-10-10 PROCEDURE — 80048 BASIC METABOLIC PNL TOTAL CA: CPT | Performed by: STUDENT IN AN ORGANIZED HEALTH CARE EDUCATION/TRAINING PROGRAM

## 2023-10-10 NOTE — PROGRESS NOTES
"Subjective:       Patient ID: Jerome Almazan is a 57 y.o. male.    Chief Complaint: Follow-up (Left lower back pressure,,  sob at night & days. After eating abd. Feels hot & twice shock in left lower abd. & no sleeping well)    HPI    Jerome Almazan is a 57 y.o. male , English speaking, with a history of:  HTN  LUCY  Carpal tunnel with h/o BL repair  R shoulder pain and "torned biceps"  LUCY on CPAP      [Local Patient]  Originally from [facility]  Lives in: [place] [id number]       Patient comes to the clinic to discuss test results.      In his annual wellness visit identified that his creatinine and GFR had changed from his baseline.    Patient has remained asymptomatic.    Repeat creatinine showed again increased to 1.5, with GFR 53.    Patient denies changes in his urine, back pain, abdominal pain, fever, changes in his blood pressure at home.      He states he has been very stressed out and he has been having some arguments at home with his wife.    He has a scheduled appointment with urology but he had canceled it.      No other complaints or concerns at this time    BMP  Lab Results   Component Value Date     10/06/2023    K 4.4 10/06/2023     10/06/2023    CO2 23 10/06/2023    BUN 22 (H) 10/06/2023    CREATININE 1.5 (H) 10/06/2023    CALCIUM 10.0 10/06/2023    ANIONGAP 13 10/06/2023    EGFRNORACEVR 53 (A) 10/06/2023       Since last seen by me:      9/2/23 AWV through [facility] - elevated PSA and Cr.    Changes in health or medications: No    Specialists visits and recommendations:        H/o ER visits:   NO    H/o Hospitalizations:  NO    H/o falls: None     Life events / lifestyle:   Nothing new      Most recent laboratories reviewed:    Recent Labs   Lab 05/06/21  0836 02/11/22 0753 09/22/23  0824   WBC 8.07 7.42 6.62   Hemoglobin 14.4 14.7 14.8   Hematocrit 42.3 43.7 44.3   MCV 88 91 92   Platelets 286 266 263       Recent Labs   Lab 05/06/21  0836 02/11/22 0753 " "04/08/22  1036 09/22/23  0824 10/06/23  0700   Glucose 102 103 94 107 94   Sodium 140 142 141 140 142   Potassium 4.7 4.0 4.3 4.8 4.4   BUN 23 H 20 23 H 18 22 H   Creatinine 1.2 1.1 1.1 1.4 1.5 H   eGFR if non African American >60.0 >60.0 >60  --   --    Total Bilirubin 0.5 0.7  --  0.6  --    AST 25 29  --  22  --    ALT 34 49 H  --  30  --        Recent Labs   Lab 09/22/23  0824   Hemoglobin A1C 5.5       Recent Labs   Lab 05/06/21  0836 02/11/22  0753 09/22/23  0824   Cholesterol 151 168 157   Triglycerides 134 105 85   HDL 37 L 42 40   LDL Cholesterol 87.2 105.0 100.0   Non-HDL Cholesterol 114 126 117       Recent Labs   Lab 02/11/22  0753 09/22/23  0824 10/06/23  0710   TSH  --  0.606  --    PSA, Screen 4.3 H 4.8 H 3.2       Recent Labs   Lab 09/22/23  0824   Hepatitis C Ab Non-reactive         Current medications:    Current Outpatient Medications:     aspirin (ECOTRIN) 81 MG EC tablet, Take 81 mg by mouth once daily., Disp: , Rfl:     losartan (COZAAR) 100 MG tablet, Take 1 tablet (100 mg total) by mouth once daily., Disp: 90 tablet, Rfl: 3    multivitamin capsule, Take 1 capsule by mouth once daily., Disp: , Rfl:     SAW PALMETTO ORAL, Take 5,300 mg by mouth once daily., Disp: , Rfl:     sildenafiL (VIAGRA) 25 MG tablet, Take 1 tablet (25 mg total) by mouth daily as needed for Erectile Dysfunction (DAILY PRN)., Disp: 30 tablet, Rfl: 3      Healthcare Maintenance:  Colon cancer screening:   Last Colonoscopy completed on 11/15/2013   Vaccinations:        Tetanus: 2016       Pneumonia: N/A       Zoster: 2021       Influenza: no       COVID vaccination: UNVACCINATED     Depression screening: PHQ2 score = 0     Annual Wellness visit approx. Month: September ROS  11-point review of systems done. Negative except for detailed in the HPI.        Objective:      BP (!) 164/89   Pulse 72   Ht 5' 4" (1.626 m)   Wt 87.6 kg (193 lb 2 oz)   BMI 33.15 kg/m²     Physical Exam   Physical Exam  Vitals and nursing note " reviewed.   Constitutional:       Appearance: Normal appearance.   HENT:      Head: Normocephalic and atraumatic.      Right Ear: Tympanic membrane normal.      Left Ear: Tympanic membrane normal.      Nose: Nose normal.      Mouth/Throat:      Mouth: Mucous membranes are moist.      Pharynx: Oropharynx is clear.   Eyes:      Extraocular Movements: Extraocular movements intact.      Conjunctiva/sclera: Conjunctivae normal.      Pupils: Pupils are equal, round, and reactive to light.   Cardiovascular:      Rate and Rhythm: Normal rate and regular rhythm.      Pulses: Normal pulses.      Heart sounds: Normal heart sounds.   Pulmonary:      Effort: Pulmonary effort is normal.      Breath sounds: Normal breath sounds.   Abdominal:      General: Bowel sounds are normal. There is no distension.      Palpations: Abdomen is soft.      Tenderness: There is no abdominal tenderness.   Musculoskeletal:         General: Normal range of motion.      Cervical back: Normal range of motion and neck supple.   Skin:     General: Skin is warm.   Neurological:      General: No focal deficit present.      Mental Status: She is alert and oriented to person, place, and time. Mental status is at baseline.   Psychiatric:         Mood and Affect: Mood normal.           Assessment:       1. Elevated serum creatinine  Comments:  Asymptomatic, slighly elevated PSA than normalized.  Pending evaluation by urology.  Will repeat BMP, UA, Kidney US.  Orders:  -     Urinalysis; Future; Expected date: 10/10/2023  -     US Kidney; Future; Expected date: 10/10/2023    2. Elevated PSA  Overview:  H/o negative biopsy 2011, PSA 4.6    Assessment & Plan:  Pending appointment for urology follow-up.    Elevated PSA with repeat within normal limits.    New onset of elevated creatinine, obstructive??? .    We will await Urology evaluation, repeat BMP and US kidneys to decided about neprhology evaluation.    Orders:  -     Ambulatory referral/consult to Urology;  Future; Expected date: 10/17/2023    3. Hypertension, essential  Assessment & Plan:  Blood pressure has been controlled, however it was elevated today in office.    Patient states he has been nervous about his test results and he also has some stressed at home.    Recommended to take blood pressure daily, keep a log, and inform any if his blood pressure has been elevated consistently.         Plan:       Referred to Urology  Will repeat BMP, UA, kidney US.      Problem list updated, medications reconciled, education provided  Lifestyle recommendations given  AVS printed, explained, and given to the patient.  RTC with test results  Then  1 year for AWV with labs.            FORREST JOSEPH MD, MPH  Internal Medicine  [facility]  [facility]

## 2023-10-10 NOTE — ASSESSMENT & PLAN NOTE
Pending appointment for urology follow-up.    Elevated PSA with repeat within normal limits.    New onset of elevated creatinine, obstructive??? .    We will await Urology evaluation, repeat BMP and US kidneys to decided about neprhology evaluation.

## 2023-10-10 NOTE — ASSESSMENT & PLAN NOTE
Blood pressure has been controlled, however it was elevated today in office.    Patient states he has been nervous about his test results and he also has some stressed at home.    Recommended to take blood pressure daily, keep a log, and inform any if his blood pressure has been elevated consistently.

## 2023-10-19 ENCOUNTER — OFFICE VISIT (OUTPATIENT)
Dept: UROLOGY | Facility: CLINIC | Age: 58
End: 2023-10-19
Payer: COMMERCIAL

## 2023-10-19 ENCOUNTER — HOSPITAL ENCOUNTER (OUTPATIENT)
Dept: RADIOLOGY | Facility: HOSPITAL | Age: 58
Discharge: HOME | End: 2023-10-19
Attending: STUDENT IN AN ORGANIZED HEALTH CARE EDUCATION/TRAINING PROGRAM
Payer: COMMERCIAL

## 2023-10-19 VITALS
SYSTOLIC BLOOD PRESSURE: 130 MMHG | HEIGHT: 64 IN | DIASTOLIC BLOOD PRESSURE: 75 MMHG | WEIGHT: 185.19 LBS | BODY MASS INDEX: 31.62 KG/M2 | HEART RATE: 56 BPM

## 2023-10-19 DIAGNOSIS — Q61.02 MULTIPLE RENAL CYSTS: Primary | ICD-10-CM

## 2023-10-19 DIAGNOSIS — R79.89 ELEVATED SERUM CREATININE: ICD-10-CM

## 2023-10-19 DIAGNOSIS — R97.20 ELEVATED PSA: ICD-10-CM

## 2023-10-19 PROCEDURE — 76770 US EXAM ABDO BACK WALL COMP: CPT | Mod: 26,,, | Performed by: STUDENT IN AN ORGANIZED HEALTH CARE EDUCATION/TRAINING PROGRAM

## 2023-10-19 PROCEDURE — 99999 PR PBB SHADOW E&M-EST. PATIENT-LVL IV: CPT | Mod: PBBFAC,,, | Performed by: UROLOGY

## 2023-10-19 PROCEDURE — 76770 US EXAM ABDO BACK WALL COMP: CPT | Mod: TC

## 2023-10-19 PROCEDURE — 99999 PR PBB SHADOW E&M-EST. PATIENT-LVL IV: ICD-10-PCS | Mod: PBBFAC,,, | Performed by: UROLOGY

## 2023-10-19 PROCEDURE — 99214 OFFICE O/P EST MOD 30 MIN: CPT | Mod: S$GLB,,, | Performed by: UROLOGY

## 2023-10-19 PROCEDURE — 99214 PR OFFICE/OUTPT VISIT, EST, LEVL IV, 30-39 MIN: ICD-10-PCS | Mod: S$GLB,,, | Performed by: UROLOGY

## 2023-10-19 PROCEDURE — 76770 US KIDNEY: ICD-10-PCS | Mod: 26,,, | Performed by: STUDENT IN AN ORGANIZED HEALTH CARE EDUCATION/TRAINING PROGRAM

## 2023-10-19 NOTE — PROGRESS NOTES
Subjective:       Patient ID: Jerome Almazan is a 57 y.o. male.    Chief Complaint: Elevated PSA     This is a 57 y.o.  male patient with elevated PSA.  Previously abnormal PSA: yes. Previous biopsy: yes, biopsy Dr. Levi 2011 was negative 30 g prostate at time. Previous abnormal JASON: no.  Family history of prostate cancer: no.  Blood thinners:  no.  No significant LUTs, no hematuria.  No dysuria.      MRI recommended, wishes to observe.   Had ELSIE on wellness exam and now improved.  GUICHO 10/19/23 with ? Echogenic material in bladder, renal cysts.  UA negative, no hematuria.      PSA  10/23--3.2  9/23--4.8  6/22--3.6  2/22--4.3      LAST PSA  Lab Results   Component Value Date    PSA 3.2 10/06/2023    PSA 4.8 (H) 09/22/2023    PSA 4.3 (H) 02/11/2022    PSA 3.7 06/04/2021    PSA 4.3 (H) 05/06/2021    PSA 3.7 07/31/2020    PSA 3.0 04/12/2019    PSA 3.1 03/13/2019    PSA 2.9 04/27/2018    PSA 4.1 (H) 02/17/2017    PSA 3.6 11/11/2016    PSA 3.0 07/10/2015    PSA 3.9 09/19/2014    PSA 3.78 03/26/2013    PSA 2.4 09/21/2012    PSA 2.70 03/09/2012    PSA 3.6 09/16/2011    PSA 3.8 06/24/2011    PSA 2.4 10/02/2009    PSA 1.6 06/06/2008    PSADIAG 3.6 06/24/2022       Lab Results   Component Value Date    CREATININE 1.3 10/10/2023       ---  PMH/PSH/PFH/Meds/Allergies/Social reviewed as in chart.         Review of Systems   Constitutional:  Negative for activity change, chills and fever.   HENT:  Negative for congestion.    Respiratory:  Negative for cough, chest tightness and shortness of breath.    Cardiovascular:  Negative for chest pain and palpitations.   Gastrointestinal:  Negative for abdominal distention, abdominal pain, nausea and vomiting.   Genitourinary:  Negative for difficulty urinating, flank pain, hematuria, penile pain, scrotal swelling and testicular pain.   Musculoskeletal:  Negative for gait problem.       Objective:      Physical Exam  Constitutional:       Appearance: Normal appearance.    HENT:      Head: Normocephalic.   Pulmonary:      Effort: Pulmonary effort is normal.      Breath sounds: Normal breath sounds.   Abdominal:      General: Abdomen is flat. Bowel sounds are normal.      Palpations: Abdomen is soft.      Tenderness: There is no abdominal tenderness. There is no right CVA tenderness, left CVA tenderness or guarding.   Genitourinary:     Penis: Normal.       Testes: Normal.      Prostate: Normal.   Musculoskeletal:         General: Normal range of motion.      Cervical back: Normal range of motion.   Skin:     General: Skin is warm.   Neurological:      Mental Status: He is alert.         GUICHO 10/23:       Multiple right simple to minimally complex renal cysts.     Echogenic layering material within the bladder.  Correlation for cystitis is recommended.     Mild prostatomegaly.    Assessment:     Problem Noted   Multiple Renal Cysts 10/19/2023    On GUICHO 10/23     Elevated Psa 3/26/2013    H/o negative biopsy 2011, PSA 4.6           Plan:     Recommended consider MRI given overall health and PSA fluctuation, wishes to hold for now  Follow up in 6 months with PSA   Follow up in renal cysts in 1 year with GUICHO.      Discussion: The natural history of prostate cancer and ongoing controversy regarding screening was discussed with the patient. The potential treatment outcomes of prostate cancer were explained. The meaning of a false positive PSA and a false negative PSA has been discussed. He understands that PSA is a screening blood test. If elevated, it could be due to an enlarged prostate, inflammation of the prostate, infection of the prostate, or prostate cancer.      Broderick Ybarra MD

## 2023-12-14 ENCOUNTER — PATIENT MESSAGE (OUTPATIENT)
Dept: INTERNAL MEDICINE | Facility: CLINIC | Age: 58
End: 2023-12-14
Payer: COMMERCIAL

## 2023-12-25 PROBLEM — Z00.00 HEALTHCARE MAINTENANCE: Status: RESOLVED | Noted: 2022-09-20 | Resolved: 2023-12-25

## 2023-12-25 PROBLEM — Z00.00 ROUTINE GENERAL MEDICAL EXAMINATION AT A HEALTH CARE FACILITY: Status: RESOLVED | Noted: 2022-10-10 | Resolved: 2023-12-25

## 2024-03-05 DIAGNOSIS — Z00.00 ROUTINE MEDICAL EXAM: Primary | ICD-10-CM

## 2024-03-11 ENCOUNTER — OFFICE VISIT (OUTPATIENT)
Dept: ORTHOPEDICS | Facility: CLINIC | Age: 59
End: 2024-03-11
Payer: COMMERCIAL

## 2024-03-11 VITALS — BODY MASS INDEX: 31.58 KG/M2 | WEIGHT: 185 LBS | HEIGHT: 64 IN

## 2024-03-11 DIAGNOSIS — G56.01 RIGHT CARPAL TUNNEL SYNDROME: Primary | ICD-10-CM

## 2024-03-11 PROCEDURE — 20550 NJX 1 TENDON SHEATH/LIGAMENT: CPT | Mod: RT,S$GLB,, | Performed by: ORTHOPAEDIC SURGERY

## 2024-03-11 PROCEDURE — 99999 PR PBB SHADOW E&M-EST. PATIENT-LVL III: CPT | Mod: PBBFAC,,, | Performed by: ORTHOPAEDIC SURGERY

## 2024-03-11 PROCEDURE — 99203 OFFICE O/P NEW LOW 30 MIN: CPT | Mod: 25,S$GLB,, | Performed by: ORTHOPAEDIC SURGERY

## 2024-03-11 RX ORDER — MELOXICAM 15 MG/1
15 TABLET ORAL DAILY
Qty: 30 TABLET | Refills: 1 | Status: SHIPPED | OUTPATIENT
Start: 2024-03-11 | End: 2024-04-12

## 2024-03-11 RX ORDER — TRIAMCINOLONE ACETONIDE 40 MG/ML
20 INJECTION, SUSPENSION INTRA-ARTICULAR; INTRAMUSCULAR
Status: COMPLETED | OUTPATIENT
Start: 2024-03-11 | End: 2024-03-11

## 2024-03-11 RX ORDER — GABAPENTIN 300 MG/1
300 CAPSULE ORAL NIGHTLY
Qty: 30 CAPSULE | Refills: 1 | Status: SHIPPED | OUTPATIENT
Start: 2024-03-11 | End: 2024-04-12

## 2024-03-11 RX ADMIN — TRIAMCINOLONE ACETONIDE 20 MG: 40 INJECTION, SUSPENSION INTRA-ARTICULAR; INTRAMUSCULAR at 02:03

## 2024-03-11 NOTE — PROGRESS NOTES
"Subjective:      Patient ID: Jerome Almazan is a 58 y.o. male.    Chief Complaint: Numbness and Carpal Tunnel of the Right Hand (Pt states pain is more serve in right hand and states trigger finger on pinky . Pt also states pain travels from hand up to shoulder . ) and Numbness and Carpal Tunnel of the Left Hand      HPI  Jerome Almazan is a  58 y.o. male presenting today for bilateral hand symptoms including numbness tingling and intermittent locking of the right small finger.  There was not a history of trauma.  Onset of symptoms began several months ago   He did have carpal tunnel surgery more than 20 years ago and he believes that his current symptoms are similar   He has been using a wrist brace for the right wrist but not the left   He does have pain at night   The locking seems to be intermittent mainly involving the right small finger.      Review of patient's allergies indicates:  No Known Allergies      Current Outpatient Medications   Medication Sig Dispense Refill    aspirin (ECOTRIN) 81 MG EC tablet Take 81 mg by mouth once daily.      losartan (COZAAR) 100 MG tablet Take 1 tablet (100 mg total) by mouth once daily. 90 tablet 3    multivitamin capsule Take 1 capsule by mouth once daily.      SAW PALMETTO ORAL Take 5,300 mg by mouth once daily.      sildenafiL (VIAGRA) 25 MG tablet Take 1 tablet (25 mg total) by mouth daily as needed for Erectile Dysfunction (DAILY PRN). 30 tablet 3    gabapentin (NEURONTIN) 300 MG capsule Take 1 capsule (300 mg total) by mouth every evening. 30 capsule 1     No current facility-administered medications for this visit.       Past Medical History:   Diagnosis Date    Abnormal PSA 4/5/2013    Hypertension        Past Surgical History:   Procedure Laterality Date    CARPAL TUNNEL RELEASE      SHOULDER SURGERY         Review of Systems:  ROS    OBJECTIVE:     PHYSICAL EXAM:  Height: 5' 4" (162.6 cm) Weight: 83.9 kg (185 lb)  Vitals:    03/11/24 1410 " "  Weight: 83.9 kg (185 lb)   Height: 5' 4" (1.626 m)   PainSc:   4     Well developed, well nourished male in no acute distress  Alert and oriented x 3  HEENT- Normal exam  Lungs- Clear to auscultation  Heart- Regular rate and rhythm  Abdomen- Soft nontender  Extremity exam- examination hands show some mild swelling bilaterally   Range of motion fingers full  Mildly positive Tinel sign   No atrophy noted   Sensation intact   Mild triggering of the right small finger noted    RADIOGRAPHS:  None  Comments: I have personally reviewed the imaging and I agree with the above radiologist's report.    ASSESSMENT/PLAN:     IMPRESSION:  1.  Possible bilateral carpal tunnel syndrome recurrent.      2. Mild triggering right small finger      PLAN:  Not sure if these symptoms are coming from his hand or his neck so I have ordered nerve conduction study of both upper extremities   I have also started him on Neurontin 300 mg q.h.s.   Wrist splints for nighttime use  Recommended injection for the small finger   After pause for time-out identified the right small finger injected flexor tendon sheath combination Kenalog 20 mg 0.5 cc xylocaine sterile technique  Tolerated the procedure well without complication  Follow up after the nerve test is complete       - We talked at length about the anatomy and pathophysiology of   Encounter Diagnosis   Name Primary?    Right carpal tunnel syndrome Yes           Disclaimer: This note has been generated using voice-recognition software. There may be typographical errors that have been missed during proof-reading.     "

## 2024-03-22 ENCOUNTER — HOSPITAL ENCOUNTER (OUTPATIENT)
Dept: CARDIOLOGY | Facility: CLINIC | Age: 59
Discharge: HOME | End: 2024-03-22
Payer: COMMERCIAL

## 2024-03-22 ENCOUNTER — OFFICE VISIT (OUTPATIENT)
Dept: INTERNAL MEDICINE | Facility: CLINIC | Age: 59
End: 2024-03-22
Payer: COMMERCIAL

## 2024-03-22 ENCOUNTER — CLINICAL SUPPORT (OUTPATIENT)
Dept: INTERNAL MEDICINE | Facility: CLINIC | Age: 59
End: 2024-03-22
Payer: COMMERCIAL

## 2024-03-22 VITALS
HEIGHT: 64 IN | DIASTOLIC BLOOD PRESSURE: 92 MMHG | WEIGHT: 191.94 LBS | SYSTOLIC BLOOD PRESSURE: 130 MMHG | HEART RATE: 69 BPM | BODY MASS INDEX: 32.77 KG/M2 | OXYGEN SATURATION: 98 %

## 2024-03-22 DIAGNOSIS — Z87.19 HISTORY OF DIVERTICULOSIS: ICD-10-CM

## 2024-03-22 DIAGNOSIS — Z00.00 ENCOUNTER FOR SCREENING AND PREVENTATIVE CARE: Primary | ICD-10-CM

## 2024-03-22 DIAGNOSIS — Z00.00 ROUTINE GENERAL MEDICAL EXAMINATION AT A HEALTH CARE FACILITY: Primary | ICD-10-CM

## 2024-03-22 DIAGNOSIS — D22.9 ATYPICAL NEVI: ICD-10-CM

## 2024-03-22 DIAGNOSIS — I10 HYPERTENSION, ESSENTIAL: ICD-10-CM

## 2024-03-22 DIAGNOSIS — Z00.00 ROUTINE MEDICAL EXAM: ICD-10-CM

## 2024-03-22 DIAGNOSIS — E66.9 CLASS 2 OBESITY WITH BODY MASS INDEX (BMI) OF 36.0 TO 36.9 IN ADULT, UNSPECIFIED OBESITY TYPE, UNSPECIFIED WHETHER SERIOUS COMORBIDITY PRESENT: ICD-10-CM

## 2024-03-22 DIAGNOSIS — Z00.00 ANNUAL PHYSICAL EXAM: ICD-10-CM

## 2024-03-22 LAB
ALBUMIN SERPL BCP-MCNC: 3.9 G/DL (ref 3.5–5.2)
ALP SERPL-CCNC: 48 U/L (ref 55–135)
ALT SERPL W/O P-5'-P-CCNC: 29 U/L (ref 10–44)
ANION GAP SERPL CALC-SCNC: 5 MMOL/L (ref 8–16)
AST SERPL-CCNC: 22 U/L (ref 10–40)
BILIRUB SERPL-MCNC: 0.6 MG/DL (ref 0.1–1)
BUN SERPL-MCNC: 18 MG/DL (ref 6–20)
CALCIUM SERPL-MCNC: 9.4 MG/DL (ref 8.7–10.5)
CHLORIDE SERPL-SCNC: 107 MMOL/L (ref 95–110)
CHOLEST SERPL-MCNC: 119 MG/DL (ref 120–199)
CHOLEST/HDLC SERPL: 3.8 {RATIO} (ref 2–5)
CO2 SERPL-SCNC: 26 MMOL/L (ref 23–29)
COMPLEXED PSA SERPL-MCNC: 2.9 NG/ML (ref 0–4)
CREAT SERPL-MCNC: 1 MG/DL (ref 0.5–1.4)
ERYTHROCYTE [DISTWIDTH] IN BLOOD BY AUTOMATED COUNT: 13.3 % (ref 11.5–14.5)
EST. GFR  (NO RACE VARIABLE): >60 ML/MIN/1.73 M^2
ESTIMATED AVG GLUCOSE: 114 MG/DL (ref 68–131)
GLUCOSE SERPL-MCNC: 93 MG/DL (ref 70–110)
HBA1C MFR BLD: 5.6 % (ref 4–5.6)
HCT VFR BLD AUTO: 41.9 % (ref 40–54)
HDLC SERPL-MCNC: 31 MG/DL (ref 40–75)
HDLC SERPL: 26.1 % (ref 20–50)
HGB BLD-MCNC: 14.3 G/DL (ref 14–18)
LDLC SERPL CALC-MCNC: 72.8 MG/DL (ref 63–159)
MCH RBC QN AUTO: 31.3 PG (ref 27–31)
MCHC RBC AUTO-ENTMCNC: 34.1 G/DL (ref 32–36)
MCV RBC AUTO: 92 FL (ref 82–98)
NONHDLC SERPL-MCNC: 88 MG/DL
OHS QRS DURATION: 82 MS
OHS QTC CALCULATION: 407 MS
PLATELET # BLD AUTO: 256 K/UL (ref 150–450)
PMV BLD AUTO: 9.3 FL (ref 9.2–12.9)
POTASSIUM SERPL-SCNC: 4.3 MMOL/L (ref 3.5–5.1)
PROT SERPL-MCNC: 6.9 G/DL (ref 6–8.4)
RBC # BLD AUTO: 4.57 M/UL (ref 4.6–6.2)
SODIUM SERPL-SCNC: 138 MMOL/L (ref 136–145)
TRIGL SERPL-MCNC: 76 MG/DL (ref 30–150)
TSH SERPL DL<=0.005 MIU/L-ACNC: 0.66 UIU/ML (ref 0.4–4)
WBC # BLD AUTO: 6.08 K/UL (ref 3.9–12.7)

## 2024-03-22 PROCEDURE — 99999 PR PBB SHADOW E&M-EST. PATIENT-LVL IV: CPT | Mod: PBBFAC,,, | Performed by: EMERGENCY MEDICINE

## 2024-03-22 PROCEDURE — 93010 ELECTROCARDIOGRAM REPORT: CPT | Mod: S$GLB,,, | Performed by: INTERNAL MEDICINE

## 2024-03-22 PROCEDURE — 80061 LIPID PANEL: CPT | Performed by: EMERGENCY MEDICINE

## 2024-03-22 PROCEDURE — 80053 COMPREHEN METABOLIC PANEL: CPT | Performed by: EMERGENCY MEDICINE

## 2024-03-22 PROCEDURE — 83036 HEMOGLOBIN GLYCOSYLATED A1C: CPT | Performed by: EMERGENCY MEDICINE

## 2024-03-22 PROCEDURE — 84153 ASSAY OF PSA TOTAL: CPT | Performed by: EMERGENCY MEDICINE

## 2024-03-22 PROCEDURE — 93005 ELECTROCARDIOGRAM TRACING: CPT | Mod: S$GLB,,, | Performed by: EMERGENCY MEDICINE

## 2024-03-22 PROCEDURE — 99396 PREV VISIT EST AGE 40-64: CPT | Mod: S$GLB,,, | Performed by: EMERGENCY MEDICINE

## 2024-03-22 PROCEDURE — 85027 COMPLETE CBC AUTOMATED: CPT | Performed by: EMERGENCY MEDICINE

## 2024-03-22 PROCEDURE — 84443 ASSAY THYROID STIM HORMONE: CPT | Performed by: EMERGENCY MEDICINE

## 2024-03-22 NOTE — PROGRESS NOTES
[facility]      TODAY'S Date 3/22/2024  Patient ID: Jerome Almazan is a 58 y.o. male   MRN: [id number]  Primary Physician: Farzana Harris MD    SUBJECTIVE     Chief Complaint:   Chief Complaint   Patient presents with    [facility]      HPI:   Reviewed medical, surgical, social and family history, medications, appropriate preventive health screenings, as well as vaccination history. Updates as noted below or in assessment and plan.    This is a very pleasant 58 y.o. nonsmoking, RHD male with PMHx HTN, LUCY on CPAP. He is a new patient to me, presenting for an annual exam in [facility]. The patient is currently in good health, with the exception of generally nonbloody diarrhea, nausea and dizziness for the last 2-3 days and mild discomfort to R > L wrist for years.  Symptoms have improved markedly and associated with stomach cramps.  Denies taking anticoagulants.  He has meloxicam and aspirin on his med list.  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 He has 3 adult children from a previous relationship and 3 grandchildren. In his free time, he enjoys fixing up his '66 [place].  He goes to the gym 6 days a week and tends to do weightlifting.  Patient does not follow any formal flexibility routine at the current time.  His sleep schedule is consistent, with a bedtime of 8:30 p.m. and wake-up time approximately 4:00 a.m., but he does not feel well-rested.  His snoring has markedly improved since using CPAP.  In terms of diet, the patient typically eggs, toast, bagel, ham and/or cheese for breakfast.  Lunch is a salad and dinner consists of  grilled chicken, vegetables, ground meat, gumbo, soup or salad.  The patient snacks on oranges, nectarines or bananas. He drinks 32 oz of water and 4-6 cups of coffee daily. The last cup of coffee is at 10:00 a.m. when he is working and 8:00 p.m. when he is off.  He has the  occasional cranberry or apple juice with tea and may drink alcohol once a month.  He takes saw Palmetto as a supplement. There is no report of hearing loss, tinnitus, noise exposure, cough, sneezing, tingling, clumsiness, numbness, recent trauma, fever/chills, vomiting, IV drug use, or loss of bowel or bladder control.     SCREENING:   Prostate:    History of abnormal. Follows with urology.   Colonoscopy:    Due  Depression:    negative   Anxiety:    negative  Eye Exam:    Due, wears readers  Dental Exam:    Routine q 4 months   Ear Exam:    No complaints. Last audiogram normal 13 years ago  Skin:     Does not use sunscreen. Negative for recent sunburn.   Sex:     Monogamous relationship  Transportation safety:   Uses restraint consistently, does not drink alcohol before or while driving  Firearm safety:   Store gun safely  Tobacco use:    None reported    A review of medical records indicates Specialists:   Hand Surgery - Sanjay Leary Jr., MD for Right carpal tunnel syndrome   Urology - Broderick Ybarra MD for renal cyst and abnormal PSA  General Surgery - Reno Cox MD for Left inguinal hernia  Sleep Medicine - Fidel Mckay MD      Immunization History   Administered Date(s) Administered    Tdap 11/11/2016    Zoster Recombinant 01/21/2021, 05/20/2021     Past Medical History:   Diagnosis Date    Abnormal PSA 4/5/2013    Hypertension      Past Surgical History:   Procedure Laterality Date    CARPAL TUNNEL RELEASE      SHOULDER SURGERY       Family History   Problem Relation Age of Onset    Heart disease Mother     Heart disease Father     Diabetes Brother     Prostate cancer Neg Hx     Colon cancer Neg Hx      Social History     Tobacco Use    Smoking status: Never    Smokeless tobacco: Never   Substance Use Topics    Alcohol use: Yes     Comment: occasional    Drug use: No     Past Medical, Surgical and Social history reviewed and verified by me.    Review of patient's allergies  "indicates:  No Known Allergies    Current Outpatient Medications on File Prior to Visit   Medication Sig Dispense Refill    aspirin (ECOTRIN) 81 MG EC tablet Take 81 mg by mouth once daily.      gabapentin (NEURONTIN) 300 MG capsule Take 1 capsule (300 mg total) by mouth every evening. 30 capsule 1    losartan (COZAAR) 100 MG tablet Take 1 tablet (100 mg total) by mouth once daily. 90 tablet 3    meloxicam (MOBIC) 15 MG tablet Take 1 tablet (15 mg total) by mouth once daily. 30 tablet 1    multivitamin capsule Take 1 capsule by mouth once daily.      SAW PALMETTO ORAL Take 5,300 mg by mouth once daily.      sildenafiL (VIAGRA) 25 MG tablet Take 1 tablet (25 mg total) by mouth daily as needed for Erectile Dysfunction (DAILY PRN). 30 tablet 3     No current facility-administered medications on file prior to visit.       Review of Systems as per HPI  ROS  Constitutional: Negative for activity change, appetite change, fatigue, diaphoresis, chills and fever.   Respiratory: Negative for apnea, choking, chest tightness and wheezing.  Genitourinary: Negative for hematuria, flank pain, frequency and dysuria.   Skin: Negative for color change, pallor, rash and wound.   Psychiatric/Behavioral: Negative for agitation, behavioral problems, confusion, decreased concentration and dysphoric mood.     All other systems reviewed and are negative.    OBJECTIVE     PHYSICAL EXAM  Vitals:    03/22/24 1027   BP: (!) 130/92   Pulse: 69   SpO2: 98%   Weight: 87.1 kg (191 lb 14.6 oz)   Height: 5' 4" (1.626 m)     Vital Signs (Most Recent):  Pulse: 69 (03/22/24 1027)  BP: (!) 130/92 (03/22/24 1027)  SpO2: 98 % (03/22/24 1027)   Weight:   Wt Readings from Last 1 Encounters:   03/22/24 87.1 kg (191 lb 14.6 oz)     Body mass index is 32.94 kg/m².     Vital signs and nursing assessment noted:  Mildly elevated blood pressure    GEN:   NAD, A & Ox3, atraumatic, well appearing, nontoxic appearing  HEENT:  PERRLA, EOMI, moist membranes, nl " conjunctiva, no scleral icterus, no nystagmus, no nodes/nodules, soft, supple, FROM, no trachial deviation, nexus negative  CV:   RRR no m/r/g, 2+ radial pulses, <2sec cap refill, no obvious JVD  RESP:  CTA B, no w/r/r, equal and bilateral chest rise, no respiratory distress  ABD:   soft, Nontender, Nondistended, +BS, no guarding/rebound  :   Deferred  BACK:  FROM, no midline tenderness, no paraspinal tenderness  EXT:   FROM, JACOME x 4, no swelling, no edema, no calf tenderness, no bony tenderness, no warmth or redness, no crepitus, no obvious deformity  LYMPH:  no gross adenopathy  NEURO:  GCS 15, CN II-XII grossly intact, no obvious motor/sensory deficit, no tremor, negative Romberg,  nl gait/coordination  PSYCH:   Cooperative, no SI/HI, no anxiety, nl mood/affect, nl judgement/thought process  SKIN:  Irregular shaped mole/macule(s) otherwise no rashes/lesions or masses, Warm, dry, intact, nl color, no pallor    Tests    EKG  Reviewed and independently interpreted:  No STEMI  NSR with HR 65  Nl conduction, nl intervals  Nl ST and T wave   No ectopy, Nl axis    Labs reviewed and independently interpreted. Imaging studies reviewed.   Recent Results (from the past 2016 hour(s))   Comprehensive metabolic panel    Collection Time: 03/22/24 10:05 AM   Result Value Ref Range    Sodium 138 136 - 145 mmol/L    Potassium 4.3 3.5 - 5.1 mmol/L    Chloride 107 95 - 110 mmol/L    CO2 26 23 - 29 mmol/L    Glucose 93 70 - 110 mg/dL    BUN 18 6 - 20 mg/dL    Creatinine 1.0 0.5 - 1.4 mg/dL    Calcium 9.4 8.7 - 10.5 mg/dL    Total Protein 6.9 6.0 - 8.4 g/dL    Albumin 3.9 3.5 - 5.2 g/dL    Total Bilirubin 0.6 0.1 - 1.0 mg/dL    Alkaline Phosphatase 48 (L) 55 - 135 U/L    AST 22 10 - 40 U/L    ALT 29 10 - 44 U/L    eGFR >60.0 >60 mL/min/1.73 m^2    Anion Gap 5 (L) 8 - 16 mmol/L   CBC Without Differential    Collection Time: 03/22/24 10:05 AM   Result Value Ref Range    WBC 6.08 3.90 - 12.70 K/uL    RBC 4.57 (L) 4.60 - 6.20 M/uL     Hemoglobin 14.3 14.0 - 18.0 g/dL    Hematocrit 41.9 40.0 - 54.0 %    MCV 92 82 - 98 fL    MCH 31.3 (H) 27.0 - 31.0 pg    MCHC 34.1 32.0 - 36.0 g/dL    RDW 13.3 11.5 - 14.5 %    Platelets 256 150 - 450 K/uL    MPV 9.3 9.2 - 12.9 fL   Lipid panel    Collection Time: 03/22/24 10:05 AM   Result Value Ref Range    Cholesterol 119 (L) 120 - 199 mg/dL    Triglycerides 76 30 - 150 mg/dL    HDL 31 (L) 40 - 75 mg/dL    LDL Cholesterol 72.8 63.0 - 159.0 mg/dL    HDL/Cholesterol Ratio 26.1 20.0 - 50.0 %    Total Cholesterol/HDL Ratio 3.8 2.0 - 5.0    Non-HDL Cholesterol 88 mg/dL   TSH    Collection Time: 03/22/24 10:05 AM   Result Value Ref Range    TSH 0.661 0.400 - 4.000 uIU/mL   PSA, Screening (every year)    Collection Time: 03/22/24 10:05 AM   Result Value Ref Range    PSA, Screen 2.9 0.00 - 4.00 ng/mL   Hemoglobin A1c    Collection Time: 03/22/24 10:05 AM   Result Value Ref Range    Hemoglobin A1C 5.6 4.0 - 5.6 %    Estimated Avg Glucose 114 68 - 131 mg/dL   EKG 12-lead    Collection Time: 03/22/24 10:15 AM   Result Value Ref Range    QRS Duration 82 ms    [facility] QTC Calculation 407 ms           Latest Reference Range & Units 11/11/16 07:25   HIV 1/2 Ag/Ab Negative  Negative     US Kidney  Oct 2023 Impression:  Multiple right simple to minimally complex renal cysts.   Echogenic layering material within the bladder.  Correlation for cystitis is recommended.   Mild prostatomegaly.    US Abdomen Limited_Hernia Oct 2022 FINDINGS:  There is no finding to indicate right inguinal hernia.   There is a left inguinal hernia which appears to contain fat.  Hernia neck is 0.4 cm.    CXR Sept 2022 Impression: No acute radiographic findings in the chest.     Sleep study July 2021 Findings are consistent with mild to moderately severe obstructive sleep apnea    CT Cardiac Scoring July 2021 Impression:   Agatston calcium score equals 30, which translates to the 25-50th percentile for coronary calcium load based on age and sex.    Additional  findings and recommendations above.  Hepatic steatosis.    CT Abdomen Pelvis With Contrast March 2019 IMPRESSION:   No acute process in the abdomen or pelvis.   Diverticulosis coli.  No evidence to suggest acute diverticulitis.   Two hypodensities in the right kidney consistent with a simple cyst and too small to characterize, likely representing simple cyst.    MRI Upper Extremity Joint Without Contraast Right Nov 2017 IMPRESSION:   Supraspinatus and infraspinatus tendinopathy.  Nonvisualization of the biceps tendon consistent with tear   Fluid within the subacromial/subdeltoid bursa consistent with bursitis.    X-Ray Clavicle Right Nov 2017 Findings:  On 2 views of the RIGHT clavicle I detect no fracture, dislocation, radiopaque retained foreign body, lytic or blastic lesion, erosion, chondrocalcinosis or significant abnormality knee measures portion of the chest.    Colonoscopy Nov 2013 Impression:           - The entire examined colon is normal.   ASSESSMENT:     1. Encounter for screening and preventative care    2. Annual physical exam    3. Class 2 obesity with body mass index (BMI) of 36.0 to 36.9 in adult, unspecified obesity type, unspecified whether serious comorbidity present    4. History of diverticulosis        Health Maintenance   Topic Date Due    Colorectal Cancer Screening  11/15/2023    PROSTATE-SPECIFIC ANTIGEN  03/22/2025    TETANUS VACCINE  11/11/2026    Lipid Panel  03/22/2029    Hepatitis C Screening  Completed    Shingles Vaccine  Completed     58 y.o. male PMHx HTN on losartan presents for annual exam in [facility].  Patient is complaining of resolving nausea, diarrhea, and dizziness.  Patient is currently taking aspirin and meloxicam.  He does find some blood when he wipes.  Patient also has right greater than left wrist discomfort that has been present for years.  Except for irregular duration of moles, exam is relatively benign. Home medications and health maintenance reviewed.  Immunization status: up to date and documented, missing doses of influenza, RSV.. Scheduled cancer screenings completed. MDM  Reviewed: previous chart, nursing note and vitals  Reviewed previous: labs, ECG, x-ray, ultrasound and CT scan  Interpretation: labs and ECG (Low chol, HDL, AP otherwise relatively unremarkable Lipid Panel, CMP, CBC, TSH, PSA, HgA1C, HIV, Hep C ab)    The ASCVD Risk score (Marilyn OSUNA, et al., 2019) failed to calculate for the following reasons:    The valid total cholesterol range is 130 to 320 mg/dL     PLAN:   Periodic health maintenance visits annually or sooner with concerns. Consider audiograms every 3-5 years or sooner with concerns.  Routine labs CMP, CBC, Lipid, HgA1C, TSH, PSA. Schedule colonoscopy.  Vaccinations to be obtained at local pharmacy or with PCP Farzana Harris MD  Hold NSAIDs. Continue all other current medications.   Encouraged healthy eating, exercise, weight management and possibly in the form of yoga or other flexibility/agility training.  Recommend a high fiber, lean protein diet that is high in complex carbohydrates such as non-starchy vegetables and whole grains.   Recommend 150 minutes of moderate-intensity physical activity and 2 days of muscle strengthening activity weekly.  Recommend hearing protection when in noisy environments.   Recommend daily sun protection/avoidance, use of at least SPF 30, broad spectrum sunscreen (OTC drug), and routine physician surveillance to optimize early detection.  Orders Placed This Encounter    Ambulatory referral/consult to Dermatology       The results of physical exam findings, labs, and imaging were reviewed with the patient. Management of above assessments/visit diagnoses was discussed with patient. Precautions for return discussed at length. Patient was given ample time for questions. All questions asked and answered to the satisfaction of the patient. Patient is in agreement with the above and verbalized understanding.  Total time spent caring for the patient today was 35 minutes. This includes time spent before the visit reviewing the chart, time spent during the visit, and time spent after the visit on documentation.    Moises Urena MD  [facility]    Disclaimer: This document was created using voice recognition software ([facility]). Although it may be edited, this document may contain errors related to incorrect recognition of the spoken word. Please contact the physician if clarification is needed.

## 2024-04-12 RX ORDER — GABAPENTIN 300 MG/1
300 CAPSULE ORAL NIGHTLY
Qty: 30 CAPSULE | Refills: 1 | Status: SHIPPED | OUTPATIENT
Start: 2024-04-12

## 2024-04-12 RX ORDER — MELOXICAM 15 MG/1
TABLET ORAL
Qty: 30 TABLET | Refills: 1 | Status: SHIPPED | OUTPATIENT
Start: 2024-04-12 | End: 2024-06-17 | Stop reason: SDUPTHER

## 2024-04-15 ENCOUNTER — OFFICE VISIT (OUTPATIENT)
Dept: ORTHOPEDICS | Facility: CLINIC | Age: 59
End: 2024-04-15
Payer: COMMERCIAL

## 2024-04-15 ENCOUNTER — PATIENT MESSAGE (OUTPATIENT)
Dept: ORTHOPEDICS | Facility: CLINIC | Age: 59
End: 2024-04-15

## 2024-04-15 ENCOUNTER — TELEPHONE (OUTPATIENT)
Dept: ORTHOPEDICS | Facility: CLINIC | Age: 59
End: 2024-04-15

## 2024-04-15 VITALS — BODY MASS INDEX: 32.74 KG/M2 | WEIGHT: 191.81 LBS | HEIGHT: 64 IN

## 2024-04-15 DIAGNOSIS — G56.01 RIGHT CARPAL TUNNEL SYNDROME: Primary | ICD-10-CM

## 2024-04-15 PROCEDURE — 99999 PR PBB SHADOW E&M-EST. PATIENT-LVL III: CPT | Mod: PBBFAC,,, | Performed by: ORTHOPAEDIC SURGERY

## 2024-04-15 PROCEDURE — 99214 OFFICE O/P EST MOD 30 MIN: CPT | Mod: S$GLB,,, | Performed by: ORTHOPAEDIC SURGERY

## 2024-04-15 RX ORDER — CEFAZOLIN SODIUM 2 G/50ML
2 SOLUTION INTRAVENOUS
Status: CANCELLED | OUTPATIENT
Start: 2024-04-15

## 2024-04-15 NOTE — H&P (VIEW-ONLY)
CC:  Right carpal tunnel syndrome and right cubital tunnel syndrome        HPI:  Jerome Almazan is a very pleasant 58 y.o. male with ongoing symptoms right hand for the past 6 months   He is reporting numbness tingling in the right hand symptoms worse with use  Also reporting weakness   He did have surgery on the right hand for carpal tunnel release about 20 years ago   Recent nerve conduction study shows evidence of moderate right carpal tunnel syndrome and moderate right cubital tunnel syndrome at the elbow   I went over these findings with the patient today   No further triggering reported         PAST MEDICAL HISTORY:   Past Medical History:   Diagnosis Date    Abnormal PSA 4/5/2013    Hypertension      PAST SURGICAL HISTORY:   Past Surgical History:   Procedure Laterality Date    CARPAL TUNNEL RELEASE      SHOULDER SURGERY       FAMILY HISTORY:   Family History   Problem Relation Name Age of Onset    Heart disease Mother      Heart disease Father      Diabetes Brother      Prostate cancer Neg Hx      Colon cancer Neg Hx       SOCIAL HISTORY:   Social History     Socioeconomic History    Marital status:    Occupational History     Employer: [employer]   Tobacco Use    Smoking status: Never    Smokeless tobacco: Never   Substance and Sexual Activity    Alcohol use: Yes     Comment: occasional    Drug use: No    Sexual activity: Not Currently       MEDICATIONS:   Current Outpatient Medications:     aspirin (ECOTRIN) 81 MG EC tablet, Take 81 mg by mouth once daily., Disp: , Rfl:     gabapentin (NEURONTIN) 300 MG capsule, TAKE 1 CAPSULE(300 MG) BY MOUTH EVERY EVENING, Disp: 30 capsule, Rfl: 1    meloxicam (MOBIC) 15 MG tablet, TAKE 1 TABLET(15 MG) BY MOUTH DAILY, Disp: 30 tablet, Rfl: 1    multivitamin capsule, Take 1 capsule by mouth once daily., Disp: , Rfl:     SAW PALMETTO ORAL, Take 5,300 mg by mouth once daily., Disp: , Rfl:     sildenafiL (VIAGRA) 25 MG tablet, Take 1 tablet (25 mg  "total) by mouth daily as needed for Erectile Dysfunction (DAILY PRN)., Disp: 30 tablet, Rfl: 3    losartan (COZAAR) 100 MG tablet, Take 1 tablet (100 mg total) by mouth once daily., Disp: 90 tablet, Rfl: 3  ALLERGIES: Review of patient's allergies indicates:  No Known Allergies    Review of Systems:  Constitutional: no fever or chills  ENT: no nasal congestion or sore throat  Respiratory: no cough or shortness of breath  Cardiovascular: no chest pain or palpitations  Gastrointestinal: no nausea or vomiting, PUD, GERD, NSAID intolerance  Genitourinary: no hematuria or dysuria  Integument/Breast: no rash or pruritis  Hematologic/Lymphatic: no easy bruising or lymphadenopathy  Musculoskeletal: see HPI  Neurological: no seizures or tremors  Behavioral/Psych: no auditory or visual hallucinations      Physical Exam   Vitals:    04/15/24 1420   Weight: 87 kg (191 lb 12.8 oz)   Height: 5' 4" (1.626 m)   PainSc: 0-No pain       Constitutional: Oriented to person, place, and time. Appears well-developed and well-nourished.   HENT:   Head: Normocephalic and atraumatic.   Nose: Nose normal.   Eyes: No scleral icterus.   Neck: Normal range of motion.   Cardiovascular: Normal rate and regular rhythm.    Pulses:       Radial pulses are 2+ on the right side, and 2+ on the left side.   Pulmonary/Chest: Effort normal and breath sounds normal.   Abdominal: Soft.   Neurological: Alert and oriented to person, place, and time.   Skin: Skin is warm.   Psychiatric: Normal mood and affect.     MUSCULOSKELETAL UPPER EXTREMITY:  Examination of the right hand there is decreased sensation in the index finger and the small finger Tinel sign mildly positive at the wrist and elbow   Range of motion fingers full no triggering no clawing    strength slightly decreased   No atrophy            Diagnostic Studies:  Nerve conduction study demonstrates right carpal tunnel syndrome and right cubital tunnel syndrome        Assessment:  1. Right carpal " "tunnel syndrome.      2. Right cubital tunnel syndrome    Plan:  Patient would like to set up outpatient surgery for combined procedure right carpal tunnel release and ulnar nerve decompression right elbow  He will need to be off work for 4-6 weeks following surgery      The risks and benefits of surgery were discussed with the patient today and they understand.  The consent was signed in the office for surgery.      Sanjay Leary MD (Jay)  [facility]  Orthopedic Upper Extremity Surgery       "

## 2024-04-15 NOTE — TELEPHONE ENCOUNTER
Responded to pt per via portal.  ----- Message from Cyndie Lenz sent at 4/15/2024  3:49 PM CDT -----  Regarding: pt called  Name of Who is Calling: DMITRY VALVERDE [[id number]]      What is the request in detail: requesting to speak with office about procedure date change. Pt states it is urgent.  Please advise      Can the clinic reply by JAYSTYLER: No      What Number to Call Back if not in [facility]: Telephone Information:    Mobile          [phone]

## 2024-04-15 NOTE — PROGRESS NOTES
CC:  Right carpal tunnel syndrome and right cubital tunnel syndrome        HPI:  Jerome Almazan is a very pleasant 58 y.o. male with ongoing symptoms right hand for the past 6 months   He is reporting numbness tingling in the right hand symptoms worse with use  Also reporting weakness   He did have surgery on the right hand for carpal tunnel release about 20 years ago   Recent nerve conduction study shows evidence of moderate right carpal tunnel syndrome and moderate right cubital tunnel syndrome at the elbow   I went over these findings with the patient today   No further triggering reported         PAST MEDICAL HISTORY:   Past Medical History:   Diagnosis Date    Abnormal PSA 4/5/2013    Hypertension      PAST SURGICAL HISTORY:   Past Surgical History:   Procedure Laterality Date    CARPAL TUNNEL RELEASE      SHOULDER SURGERY       FAMILY HISTORY:   Family History   Problem Relation Name Age of Onset    Heart disease Mother      Heart disease Father      Diabetes Brother      Prostate cancer Neg Hx      Colon cancer Neg Hx       SOCIAL HISTORY:   Social History     Socioeconomic History    Marital status:    Occupational History     Employer: [employer]   Tobacco Use    Smoking status: Never    Smokeless tobacco: Never   Substance and Sexual Activity    Alcohol use: Yes     Comment: occasional    Drug use: No    Sexual activity: Not Currently       MEDICATIONS:   Current Outpatient Medications:     aspirin (ECOTRIN) 81 MG EC tablet, Take 81 mg by mouth once daily., Disp: , Rfl:     gabapentin (NEURONTIN) 300 MG capsule, TAKE 1 CAPSULE(300 MG) BY MOUTH EVERY EVENING, Disp: 30 capsule, Rfl: 1    meloxicam (MOBIC) 15 MG tablet, TAKE 1 TABLET(15 MG) BY MOUTH DAILY, Disp: 30 tablet, Rfl: 1    multivitamin capsule, Take 1 capsule by mouth once daily., Disp: , Rfl:     SAW PALMETTO ORAL, Take 5,300 mg by mouth once daily., Disp: , Rfl:     sildenafiL (VIAGRA) 25 MG tablet, Take 1 tablet (25 mg  "total) by mouth daily as needed for Erectile Dysfunction (DAILY PRN)., Disp: 30 tablet, Rfl: 3    losartan (COZAAR) 100 MG tablet, Take 1 tablet (100 mg total) by mouth once daily., Disp: 90 tablet, Rfl: 3  ALLERGIES: Review of patient's allergies indicates:  No Known Allergies    Review of Systems:  Constitutional: no fever or chills  ENT: no nasal congestion or sore throat  Respiratory: no cough or shortness of breath  Cardiovascular: no chest pain or palpitations  Gastrointestinal: no nausea or vomiting, PUD, GERD, NSAID intolerance  Genitourinary: no hematuria or dysuria  Integument/Breast: no rash or pruritis  Hematologic/Lymphatic: no easy bruising or lymphadenopathy  Musculoskeletal: see HPI  Neurological: no seizures or tremors  Behavioral/Psych: no auditory or visual hallucinations      Physical Exam   Vitals:    04/15/24 1420   Weight: 87 kg (191 lb 12.8 oz)   Height: 5' 4" (1.626 m)   PainSc: 0-No pain       Constitutional: Oriented to person, place, and time. Appears well-developed and well-nourished.   HENT:   Head: Normocephalic and atraumatic.   Nose: Nose normal.   Eyes: No scleral icterus.   Neck: Normal range of motion.   Cardiovascular: Normal rate and regular rhythm.    Pulses:       Radial pulses are 2+ on the right side, and 2+ on the left side.   Pulmonary/Chest: Effort normal and breath sounds normal.   Abdominal: Soft.   Neurological: Alert and oriented to person, place, and time.   Skin: Skin is warm.   Psychiatric: Normal mood and affect.     MUSCULOSKELETAL UPPER EXTREMITY:  Examination of the right hand there is decreased sensation in the index finger and the small finger Tinel sign mildly positive at the wrist and elbow   Range of motion fingers full no triggering no clawing    strength slightly decreased   No atrophy            Diagnostic Studies:  Nerve conduction study demonstrates right carpal tunnel syndrome and right cubital tunnel syndrome        Assessment:  1. Right carpal " "tunnel syndrome.      2. Right cubital tunnel syndrome    Plan:  Patient would like to set up outpatient surgery for combined procedure right carpal tunnel release and ulnar nerve decompression right elbow  He will need to be off work for 4-6 weeks following surgery      The risks and benefits of surgery were discussed with the patient today and they understand.  The consent was signed in the office for surgery.      Sanjay Leary MD (Jay)  [facility]  Orthopedic Upper Extremity Surgery       "

## 2024-04-16 ENCOUNTER — PATIENT MESSAGE (OUTPATIENT)
Dept: ORTHOPEDICS | Facility: CLINIC | Age: 59
End: 2024-04-16
Payer: COMMERCIAL

## 2024-04-16 ENCOUNTER — TELEPHONE (OUTPATIENT)
Dept: ORTHOPEDICS | Facility: CLINIC | Age: 59
End: 2024-04-16
Payer: COMMERCIAL

## 2024-04-16 NOTE — TELEPHONE ENCOUNTER
----- Message from Marilee Clifton sent at 4/16/2024  8:39 AM CDT -----  Pt Requesting Call Back    Who called: pt  Who called for pt:  Best call back #:  [phone]  Add notes: pt said he needs to speak to the nurse regarding rescheduling his 4/26/24 surgery; pt said he needs a call back as soon as possible due to him having to turn in paperwork which can't be done until he reschedules; pt said to note that this is the second time he reached out

## 2024-04-16 NOTE — TELEPHONE ENCOUNTER
----- Message from Marilee Clifton sent at 4/16/2024 12:22 PM CDT -----  Pt Requesting Call Back    Who called: pt  Who called for pt:  Best call back #: [phone]  Add notes: pt said he would like to speak to Traci to know if the papers he needed signed pertaining to his job were received; pt says that those papers were sent via fax

## 2024-04-16 NOTE — TELEPHONE ENCOUNTER
Spoke with patient. Surgery rescheduled for 5/3 with Dr Leary. Surgery scheduling notified. Patient stated that he will have paperwork faxed to us to fill out to be off from 5/3- 7/19

## 2024-04-16 NOTE — TELEPHONE ENCOUNTER
Spoke with patient. Informed him that I have not received his paperwork as of yet. Patient stated he will re fax it and let him know as soon as I receive it.

## 2024-04-26 ENCOUNTER — TELEPHONE (OUTPATIENT)
Dept: ORTHOPEDICS | Facility: CLINIC | Age: 59
End: 2024-04-26
Payer: COMMERCIAL

## 2024-04-26 ENCOUNTER — PATIENT MESSAGE (OUTPATIENT)
Dept: ORTHOPEDICS | Facility: CLINIC | Age: 59
End: 2024-04-26
Payer: COMMERCIAL

## 2024-04-26 NOTE — TELEPHONE ENCOUNTER
----- Message from Cyndie Lenz sent at 4/26/2024  8:40 AM CDT -----  Regarding: pt called  Name of Who is Calling: DMITRY VALVERDE [[id number]]      What is the request in detail: pt is requesting to speak with Traci about his procedure on next Friday. He is requesting a status on the short term disability form that he requested to be sent to his employer [employer] . The form was given to the office on the 04/17 . Please advise       Can the clinic reply by [facility]: No       What Number to Call Back if not in [facility]: Telephone Information:           [phone]

## 2024-04-29 ENCOUNTER — TELEPHONE (OUTPATIENT)
Dept: ORTHOPEDICS | Facility: CLINIC | Age: 59
End: 2024-04-29
Payer: COMMERCIAL

## 2024-04-29 DIAGNOSIS — I10 PRIMARY HYPERTENSION: ICD-10-CM

## 2024-04-29 RX ORDER — LOSARTAN POTASSIUM 100 MG/1
100 TABLET ORAL DAILY
Qty: 90 TABLET | Refills: 3 | Status: SHIPPED | OUTPATIENT
Start: 2024-04-29 | End: 2025-04-29

## 2024-04-29 RX ORDER — SILDENAFIL 25 MG/1
25 TABLET, FILM COATED ORAL DAILY PRN
Qty: 30 TABLET | Refills: 3 | Status: SHIPPED | OUTPATIENT
Start: 2024-04-29 | End: 2024-06-04 | Stop reason: SDUPTHER

## 2024-04-29 NOTE — TELEPHONE ENCOUNTER
----- Message from Rosalind Van sent at 4/29/2024  8:23 AM CDT -----  Type:  Needs Medical Advice    Who Called: pt  Would the patient rather a call back or a response via [facility]? call  Best Call Back Number: [phone]   Additional Information:   Pt requesting a call regarding paperwork that the provider needs to fill out for HR before his procedure on Friday 05/03/24  Pt stated this his 2nd time calling

## 2024-04-30 ENCOUNTER — TELEPHONE (OUTPATIENT)
Dept: ORTHOPEDICS | Facility: CLINIC | Age: 59
End: 2024-04-30
Payer: COMMERCIAL

## 2024-04-30 NOTE — TELEPHONE ENCOUNTER
Spoke with patient. Patient stated he would like to move his surgery to 5/10 due to his job needing additional time to process paperwork Surgery scheduling notified.

## 2024-05-08 ENCOUNTER — TELEPHONE (OUTPATIENT)
Dept: ORTHOPEDICS | Facility: CLINIC | Age: 59
End: 2024-05-08
Payer: COMMERCIAL

## 2024-05-08 NOTE — TELEPHONE ENCOUNTER
----- Message from Kathie Spicer sent at 5/8/2024  9:09 AM CDT -----  Type:  Needs Medical Advice    Who Called: pt  Would the patient rather a call back or a response via [facility]? call  Best Call Back Number:  [phone]  Additional Information: pt would like a nurse call regarding procedure on Friday 5.10

## 2024-05-08 NOTE — TELEPHONE ENCOUNTER
Spoke with patient in regards to paperwork. Patient stated that he was wanting a return to work date of 8/26. I informed him that due to the type of surgery he is having, that amount of time away from work is not needed. Usually for Carpal Tunnel procedures, Dr Leary lets patient stay out work for about 2-3 weeks. Per Dr Leary, he is ok approving a return to work date of 7/26 and if additional time is needed, then we will discuss it then. Patient did get upset stating that Dr Leary does not understand that type of work he does but I Informed the patient that we do understand but for now being that it is a minor procedure, Dr Leary can approve that amount of time off. Patient verbalized understanding.

## 2024-05-10 ENCOUNTER — ANESTHESIA (OUTPATIENT)
Dept: SURGERY | Facility: HOSPITAL | Age: 59
End: 2024-05-10
Payer: COMMERCIAL

## 2024-05-10 ENCOUNTER — ANESTHESIA EVENT (OUTPATIENT)
Dept: SURGERY | Facility: HOSPITAL | Age: 59
End: 2024-05-10
Payer: COMMERCIAL

## 2024-05-10 ENCOUNTER — HOSPITAL ENCOUNTER (OUTPATIENT)
Facility: HOSPITAL | Age: 59
Discharge: HOME | End: 2024-05-10
Attending: ORTHOPAEDIC SURGERY | Admitting: ORTHOPAEDIC SURGERY
Payer: COMMERCIAL

## 2024-05-10 VITALS
SYSTOLIC BLOOD PRESSURE: 123 MMHG | BODY MASS INDEX: 32.61 KG/M2 | RESPIRATION RATE: 18 BRPM | OXYGEN SATURATION: 100 % | HEART RATE: 58 BPM | HEIGHT: 64 IN | DIASTOLIC BLOOD PRESSURE: 78 MMHG | TEMPERATURE: 98 F | WEIGHT: 191 LBS

## 2024-05-10 DIAGNOSIS — G56.01 RIGHT CARPAL TUNNEL SYNDROME: ICD-10-CM

## 2024-05-10 PROCEDURE — 25000003 PHARM REV CODE 250: Performed by: NURSE ANESTHETIST, CERTIFIED REGISTERED

## 2024-05-10 PROCEDURE — 63600175 PHARM REV CODE 636 W HCPCS: Performed by: ANESTHESIOLOGY

## 2024-05-10 PROCEDURE — 37000009 HC ANESTHESIA EA ADD 15 MINS: Performed by: ORTHOPAEDIC SURGERY

## 2024-05-10 PROCEDURE — 63600175 PHARM REV CODE 636 W HCPCS: Performed by: ORTHOPAEDIC SURGERY

## 2024-05-10 PROCEDURE — 36000707: Performed by: ORTHOPAEDIC SURGERY

## 2024-05-10 PROCEDURE — 71000016 HC POSTOP RECOV ADDL HR: Performed by: ORTHOPAEDIC SURGERY

## 2024-05-10 PROCEDURE — 37000008 HC ANESTHESIA 1ST 15 MINUTES: Performed by: ORTHOPAEDIC SURGERY

## 2024-05-10 PROCEDURE — 64415 NJX AA&/STRD BRCH PLXS IMG: CPT | Mod: 59,RT | Performed by: ANESTHESIOLOGY

## 2024-05-10 PROCEDURE — D9220A PRA ANESTHESIA: Mod: CRNA,,, | Performed by: NURSE ANESTHETIST, CERTIFIED REGISTERED

## 2024-05-10 PROCEDURE — 64718 REVISE ULNAR NERVE AT ELBOW: CPT | Mod: RT,,, | Performed by: ORTHOPAEDIC SURGERY

## 2024-05-10 PROCEDURE — 71000015 HC POSTOP RECOV 1ST HR: Performed by: ORTHOPAEDIC SURGERY

## 2024-05-10 PROCEDURE — D9220A PRA ANESTHESIA: Mod: ANES,,, | Performed by: ANESTHESIOLOGY

## 2024-05-10 PROCEDURE — 63600175 PHARM REV CODE 636 W HCPCS: Performed by: NURSE ANESTHETIST, CERTIFIED REGISTERED

## 2024-05-10 PROCEDURE — 36000706: Performed by: ORTHOPAEDIC SURGERY

## 2024-05-10 PROCEDURE — 64721 CARPAL TUNNEL SURGERY: CPT | Mod: 51,RT,, | Performed by: ORTHOPAEDIC SURGERY

## 2024-05-10 RX ORDER — KETOROLAC TROMETHAMINE 30 MG/ML
30 INJECTION, SOLUTION INTRAMUSCULAR; INTRAVENOUS ONCE
Status: COMPLETED | OUTPATIENT
Start: 2024-05-10 | End: 2024-05-10

## 2024-05-10 RX ORDER — MIDAZOLAM HYDROCHLORIDE 1 MG/ML
INJECTION INTRAMUSCULAR; INTRAVENOUS
Status: DISCONTINUED | OUTPATIENT
Start: 2024-05-10 | End: 2024-05-10

## 2024-05-10 RX ORDER — ACETAMINOPHEN 325 MG/1
650 TABLET ORAL EVERY 4 HOURS PRN
Status: DISCONTINUED | OUTPATIENT
Start: 2024-05-10 | End: 2024-05-10 | Stop reason: HOSPADM

## 2024-05-10 RX ORDER — LABETALOL HYDROCHLORIDE 5 MG/ML
INJECTION, SOLUTION INTRAVENOUS
Status: DISCONTINUED | OUTPATIENT
Start: 2024-05-10 | End: 2024-05-10

## 2024-05-10 RX ORDER — ACETAMINOPHEN 10 MG/ML
INJECTION, SOLUTION INTRAVENOUS
Status: DISCONTINUED | OUTPATIENT
Start: 2024-05-10 | End: 2024-05-10

## 2024-05-10 RX ORDER — FENTANYL CITRATE 50 UG/ML
INJECTION, SOLUTION INTRAMUSCULAR; INTRAVENOUS
Status: DISCONTINUED | OUTPATIENT
Start: 2024-05-10 | End: 2024-05-10

## 2024-05-10 RX ORDER — MIDAZOLAM HYDROCHLORIDE 1 MG/ML
2 INJECTION, SOLUTION INTRAMUSCULAR; INTRAVENOUS ONCE
Status: COMPLETED | OUTPATIENT
Start: 2024-05-10 | End: 2024-05-10

## 2024-05-10 RX ORDER — BUPIVACAINE HYDROCHLORIDE 5 MG/ML
INJECTION, SOLUTION EPIDURAL; INTRACAUDAL
Status: DISCONTINUED
Start: 2024-05-10 | End: 2024-05-10 | Stop reason: HOSPADM

## 2024-05-10 RX ORDER — HYDROMORPHONE HYDROCHLORIDE 2 MG/ML
0.2 INJECTION, SOLUTION INTRAMUSCULAR; INTRAVENOUS; SUBCUTANEOUS EVERY 5 MIN PRN
Status: DISCONTINUED | OUTPATIENT
Start: 2024-05-10 | End: 2024-05-10 | Stop reason: HOSPADM

## 2024-05-10 RX ORDER — PROPOFOL 10 MG/ML
VIAL (ML) INTRAVENOUS
Status: DISCONTINUED | OUTPATIENT
Start: 2024-05-10 | End: 2024-05-10

## 2024-05-10 RX ORDER — MEPERIDINE HYDROCHLORIDE 50 MG/ML
12.5 INJECTION INTRAMUSCULAR; INTRAVENOUS; SUBCUTANEOUS EVERY 10 MIN PRN
Status: DISCONTINUED | OUTPATIENT
Start: 2024-05-10 | End: 2024-05-10 | Stop reason: HOSPADM

## 2024-05-10 RX ORDER — DIPHENHYDRAMINE HYDROCHLORIDE 50 MG/ML
25 INJECTION INTRAMUSCULAR; INTRAVENOUS EVERY 6 HOURS PRN
Status: DISCONTINUED | OUTPATIENT
Start: 2024-05-10 | End: 2024-05-10 | Stop reason: HOSPADM

## 2024-05-10 RX ORDER — ONDANSETRON HYDROCHLORIDE 2 MG/ML
4 INJECTION, SOLUTION INTRAVENOUS DAILY PRN
Status: DISCONTINUED | OUTPATIENT
Start: 2024-05-10 | End: 2024-05-10 | Stop reason: HOSPADM

## 2024-05-10 RX ORDER — FENTANYL CITRATE 50 UG/ML
INJECTION, SOLUTION INTRAMUSCULAR; INTRAVENOUS
Status: COMPLETED
Start: 2024-05-10 | End: 2024-05-10

## 2024-05-10 RX ORDER — ONDANSETRON 8 MG/1
8 TABLET, ORALLY DISINTEGRATING ORAL EVERY 8 HOURS PRN
Status: DISCONTINUED | OUTPATIENT
Start: 2024-05-10 | End: 2024-05-10 | Stop reason: HOSPADM

## 2024-05-10 RX ORDER — HYDROCODONE BITARTRATE AND ACETAMINOPHEN 5; 325 MG/1; MG/1
1 TABLET ORAL EVERY 4 HOURS PRN
Qty: 20 TABLET | Refills: 0 | Status: SHIPPED | OUTPATIENT
Start: 2024-05-10 | End: 2024-06-13 | Stop reason: SDUPTHER

## 2024-05-10 RX ORDER — LIDOCAINE HYDROCHLORIDE 20 MG/ML
INJECTION, SOLUTION EPIDURAL; INFILTRATION; INTRACAUDAL; PERINEURAL
Status: DISCONTINUED | OUTPATIENT
Start: 2024-05-10 | End: 2024-05-10

## 2024-05-10 RX ORDER — CEFAZOLIN SODIUM 2 G/50ML
2 SOLUTION INTRAVENOUS
Status: DISCONTINUED | OUTPATIENT
Start: 2024-05-10 | End: 2024-05-10 | Stop reason: HOSPADM

## 2024-05-10 RX ORDER — LORAZEPAM 2 MG/ML
0.25 INJECTION INTRAMUSCULAR ONCE AS NEEDED
Status: DISCONTINUED | OUTPATIENT
Start: 2024-05-10 | End: 2024-05-10 | Stop reason: HOSPADM

## 2024-05-10 RX ORDER — OXYCODONE HYDROCHLORIDE 5 MG/1
10 TABLET ORAL EVERY 4 HOURS PRN
Status: DISCONTINUED | OUTPATIENT
Start: 2024-05-10 | End: 2024-05-10 | Stop reason: HOSPADM

## 2024-05-10 RX ADMIN — CEFAZOLIN SODIUM 2 G: 2 SOLUTION INTRAVENOUS at 08:05

## 2024-05-10 RX ADMIN — FENTANYL CITRATE 100 MCG: 50 INJECTION INTRAMUSCULAR; INTRAVENOUS at 08:05

## 2024-05-10 RX ADMIN — MIDAZOLAM HYDROCHLORIDE 2 MG: 1 INJECTION, SOLUTION INTRAMUSCULAR; INTRAVENOUS at 07:05

## 2024-05-10 RX ADMIN — KETOROLAC TROMETHAMINE 30 MG: 30 INJECTION, SOLUTION INTRAMUSCULAR; INTRAVENOUS at 10:05

## 2024-05-10 RX ADMIN — ACETAMINOPHEN 1000 MG: 10 INJECTION INTRAVENOUS at 08:05

## 2024-05-10 RX ADMIN — PROPOFOL 70 MG: 10 INJECTION, EMULSION INTRAVENOUS at 08:05

## 2024-05-10 RX ADMIN — LABETALOL HYDROCHLORIDE 10 MG: 5 INJECTION INTRAVENOUS at 09:05

## 2024-05-10 RX ADMIN — PROPOFOL 100 MCG/KG/MIN: 10 INJECTION, EMULSION INTRAVENOUS at 08:05

## 2024-05-10 RX ADMIN — MIDAZOLAM 2 MG: 1 INJECTION INTRAMUSCULAR; INTRAVENOUS at 07:05

## 2024-05-10 RX ADMIN — LABETALOL HYDROCHLORIDE 5 MG: 5 INJECTION INTRAVENOUS at 09:05

## 2024-05-10 RX ADMIN — SODIUM CHLORIDE: 0.9 INJECTION, SOLUTION INTRAVENOUS at 08:05

## 2024-05-10 RX ADMIN — LIDOCAINE HYDROCHLORIDE 50 MG: 20 INJECTION, SOLUTION EPIDURAL; INFILTRATION; INTRACAUDAL; PERINEURAL at 08:05

## 2024-05-10 RX ADMIN — FENTANYL CITRATE 100 MCG: 50 INJECTION INTRAMUSCULAR; INTRAVENOUS at 07:05

## 2024-05-10 NOTE — OP NOTE
[facility] - Surgery (Hospital)  Operative Note      Date of Procedure: 5/10/2024     Procedure: Procedure(s) (LRB):  RELEASE, CARPAL TUNNEL (Right)  DECOMPRESSION, NERVE, ULNAR (Right)     Surgeons and Role:     * Sanjay Leary Jr., MD - Primary    Assisting Surgeon: None    Pre-Operative Diagnosis: Right carpal tunnel syndrome [G56.01]    Post-Operative Diagnosis: Post-Op Diagnosis Codes:     * Right carpal tunnel syndrome [G56.01]    Anesthesia: Regional    Operative Findings (including complications, if any):  Right cubital tunnel syndrome and right carpal tunnel syndrome    Description of Technical Procedures:     Preop diagnosis:  1. Right cubital tunnel syndrome.      2. Right carpal tunnel syndrome.      Postop diagnosis:  Same.      Operative procedure:  1. Ulnar nerve decompression right elbow.      2. Right carpal tunnel release.    Surgeon: Gibran.      First assistant: Germain.      Anesthesia: Regional block.      EBL:  Minimal.      Specimen: None.      Complications: None.      Operative procedure in detail as follows:     After operative consent was obtained the patient was brought to the operating room and placed supine on the operating room table.  Anesthesia by regional block performed by the anesthesia staff.  Tourniquet applied high on the right arm the right upper extremity prepped and draped out in the normal sterile fashion.  The Esmarch used to exsanguinated the limb and the tourniquet inflated 225 mmHg.      Carpal tunnel was approached 1st with a 2.5 cm incision made through the previous incision at the right palmar crease with a 15 blade care.  Careful dissection used to dissect down through the palmar fascia and the median nerve was identified.  There was a partial reconstitution of the transverse carpal ligament especially distally was tight.  It was released longitudinally proximally and distally directly over the median nerve and a partial decompression of the nerve was performed  with an epi neurotomy.  After complete release of the nerve proximally and distally hemostasis achieved with the Bovie the wound irrigated and closed with interrupted 4-0 Monocryl in subcutaneous layer Dermabond on the skin.      Attention then turned to the right elbow where a curved medial incision was made just posterior to the medial epicondyle with a 15 blade full-thickness skin flaps raised hemostasis achieved with the Bovie superficial nerves identified and protected.  Beginning proximally the ulnar nerve was identified and traced from proximal to distal carefully releasing the the overlying fascia and the FCU fascia distally and proximally the medial intermuscular septum.  There was some hypertrophy of the medial triceps but it did not cause any subluxation of the nerve after complete release of the ulnar nerve range of motion check noted to be full without subluxation hemostasis achieved with the Bovie the wound irrigated and closed with interrupted 3-0 Vicryl on the subcutaneous layer Steri-Strips on the skin.  Sterile dressings applied to the elbow and wrist followed by a wrist splint the tourniquet was deflated and the patient brought to the recovery room in stable condition.  All sponge needle counts reported as correct no complications      Significant Surgical Tasks Conducted by the Assistant(s), if Applicable: retraction    Estimated Blood Loss (EBL): * No values recorded between 5/10/2024  8:57 AM and 5/10/2024  9:48 AM *           Implants: * No implants in log *    Specimens:   Specimen (24h ago, onward)      None                    Condition: Good    Disposition: PACU - hemodynamically stable.    Attestation: I was present and scrubbed for the entire procedure.    Discharge Note    OUTCOME: Patient tolerated treatment/procedure well without complication and is now ready for discharge.    DISPOSITION: Home or Self Care    FINAL DIAGNOSIS:  Right carpal tunnel syndrome    FOLLOWUP: In  clinic    DISCHARGE INSTRUCTIONS:    Discharge Procedure Orders   Diet general     Call MD for:  temperature >100.4     Call MD for:  persistent nausea and vomiting     Call MD for:  severe uncontrolled pain     Keep surgical extremity elevated     Remove dressing in 72 hours

## 2024-05-10 NOTE — ANESTHESIA PROCEDURE NOTES
Peripheral Block    Patient location during procedure: pre-op   Block not for primary anesthetic.  Reason for block: at surgeon's request and post-op pain management   Post-op Pain Location: right arm   Start time: 5/10/2024 7:40 AM  Timeout: 5/10/2024 7:39 AM   End time: 5/10/2024 7:42 AM    Staffing  Authorizing Provider: Berlin Mims II, MD  Performing Provider: Berlin Mims II, MD    Staffing  Performed by: Berlin Mims II, MD  Authorized by: Berlin Mims II, MD    Preanesthetic Checklist  Completed: patient identified, IV checked, site marked, risks and benefits discussed, surgical consent, monitors and equipment checked, pre-op evaluation and timeout performed  Peripheral Block  Patient position: supine  Prep: ChloraPrep  Patient monitoring: heart rate, cardiac monitor, continuous pulse ox, continuous capnometry and frequent blood pressure checks  Block type: supraclavicular  Laterality: right  Injection technique: single shot  Needle  Needle type: Stimuplex   Needle gauge: 22 G  Needle length: 4 in  Needle localization: anatomical landmarks and ultrasound guidance   -ultrasound image captured on disc.  Assessment  Injection assessment: negative aspiration, negative parasthesia and local visualized surrounding nerve  Paresthesia pain: none  Heart rate change: no  Slow fractionated injection: yes  Pain Tolerance: comfortable throughout block and no complaints      Additional Notes  VSS.  [facility] RN monitoring vitals throughout procedure.  Patient tolerated procedure well.

## 2024-05-10 NOTE — TRANSFER OF CARE
"Anesthesia Transfer of Care Note    Patient: Jerome Almazan    Procedure(s) Performed: Procedure(s) (LRB):  RELEASE, CARPAL TUNNEL (Right)  DECOMPRESSION, NERVE, ULNAR (Right)    Patient location: [facility]    Anesthesia Type: MAC    Transport from OR: Transported from OR on room air with adequate spontaneous ventilation    Post pain: adequate analgesia    Post assessment: no apparent anesthetic complications and tolerated procedure well    Post vital signs: stable    Level of consciousness: awake and alert    Nausea/Vomiting: no nausea/vomiting    Complications: none    Transfer of care protocol was followed      Last vitals: Visit Vitals  BP (!) 178/75 (BP Location: Left leg, Patient Position: Lying)   Pulse 81   Temp 36.7 °C (98.1 °F)   Resp 16   Ht 5' 4" (1.626 m)   Wt 86.6 kg (191 lb)   SpO2 96%   BMI 32.79 kg/m²     "

## 2024-05-10 NOTE — ANESTHESIA PREPROCEDURE EVALUATION
05/10/2024  Jerome Almazan is a 58 y.o., male.      Pre-op Assessment    I have reviewed the Patient Summary Reports.     I have reviewed the Nursing Notes. I have reviewed the NPO Status.   I have reviewed the Medications.     Review of Systems  Anesthesia Hx:  No problems with previous Anesthesia                Social:  Non-Smoker, No Alcohol Use       Hematology/Oncology:  Hematology Normal   Oncology Normal                                   EENT/Dental:  EENT/Dental Normal           Cardiovascular:     Hypertension                                        Pulmonary:        Sleep Apnea                Hepatic/GI:  Hepatic/GI Normal                 Neurological:  Neurology Normal                                      Endocrine:  Endocrine Normal                Physical Exam  General: Well nourished, Cooperative, Alert and Oriented    Airway:  Mallampati: II / II  Mouth Opening: Normal  TM Distance: Normal  Tongue: Normal  Neck ROM: Normal ROM    Dental:  Intact    Chest/Lungs:  Clear to auscultation, Normal Respiratory Rate    Heart:  Rate: Normal  Rhythm: Regular Rhythm  Sounds: Normal        Anesthesia Plan  Type of Anesthesia, risks & benefits discussed:    Anesthesia Type: Regional, Gen Natural Airway, MAC, Gen Supraglottic Airway  Intra-op Monitoring Plan: Standard ASA Monitors  Post Op Pain Control Plan: multimodal analgesia and peripheral nerve block  Induction:  IV  Informed Consent: Informed consent signed with the Patient and all parties understand the risks and agree with anesthesia plan.  All questions answered.   ASA Score: 2  Day of Surgery Review of History & Physical: H&P Update referred to the surgeon/provider.    Ready For Surgery From Anesthesia Perspective.     .

## 2024-05-13 NOTE — ANESTHESIA POSTPROCEDURE EVALUATION
Anesthesia Post Evaluation    Patient: Jerome Almazan    Procedure(s) Performed: Procedure(s) (LRB):  RELEASE, CARPAL TUNNEL (Right)  DECOMPRESSION, NERVE, ULNAR (Right)    Final Anesthesia Type: general      Patient location during evaluation: PACU  Patient participation: Yes- Able to Participate  Level of consciousness: awake and alert  Post-procedure vital signs: reviewed and stable  Pain management: adequate  Airway patency: patent    PONV status at discharge: No PONV  Anesthetic complications: no      Respiratory status: spontaneous ventilation and room air  Hydration status: euvolemic  Follow-up not needed.              Vitals Value Taken Time   /78 05/10/24 1050   Temp 36.8 °C (98.2 °F) 05/10/24 1050   Pulse 58 05/10/24 1050   Resp 18 05/10/24 1050   SpO2 100 % 05/10/24 1050         No case tracking events are documented in the log.      Pain/Giovana Score: No data recorded

## 2024-05-24 ENCOUNTER — OFFICE VISIT (OUTPATIENT)
Dept: ORTHOPEDICS | Facility: CLINIC | Age: 59
End: 2024-05-24
Payer: COMMERCIAL

## 2024-05-24 VITALS — BODY MASS INDEX: 32.6 KG/M2 | HEIGHT: 64 IN | WEIGHT: 190.94 LBS

## 2024-05-24 DIAGNOSIS — Z98.890 POST-OPERATIVE STATE: ICD-10-CM

## 2024-05-24 DIAGNOSIS — Z98.890 S/P CUBITAL TUNNEL RELEASE: ICD-10-CM

## 2024-05-24 DIAGNOSIS — Z98.890 S/P CARPAL TUNNEL RELEASE: Primary | ICD-10-CM

## 2024-05-24 DIAGNOSIS — G56.21 ULNAR TUNNEL SYNDROME, RIGHT: ICD-10-CM

## 2024-05-24 DIAGNOSIS — G56.01 RIGHT CARPAL TUNNEL SYNDROME: ICD-10-CM

## 2024-05-24 PROCEDURE — 99024 POSTOP FOLLOW-UP VISIT: CPT | Mod: S$GLB,,,

## 2024-05-24 PROCEDURE — 99999 PR PBB SHADOW E&M-EST. PATIENT-LVL III: CPT | Mod: PBBFAC,,,

## 2024-05-24 NOTE — PROGRESS NOTES
"Subjective:      Patient ID: Jerome Almazan is a 58 y.o. male.    Chief Complaint: Post-op Evaluation (R CTR/Ulnar)      HPI: Jerome Almazan is here for a postop visit.     Surgery: right CTR + right ulnar nerve decompression (elbow)  Date of Surgery:  05/10/2024  Surgeon: Dr. Leary    He is doing well.   Pain has been controlled.   Preoperative symptoms related to carpal tunnel, including numbness, tingling, and decreased strength have improved, but not fully resolved.   Post surgical complaints include:  None.   No n/v, fever, chills, SOB, leg pain, surgical site irritation or drainage.      Past Medical History:   Diagnosis Date    Abnormal PSA 4/5/2013    Hypertension        Current Outpatient Medications:     aspirin (ECOTRIN) 81 MG EC tablet, Take 81 mg by mouth once daily., Disp: , Rfl:     gabapentin (NEURONTIN) 300 MG capsule, TAKE 1 CAPSULE(300 MG) BY MOUTH EVERY EVENING, Disp: 30 capsule, Rfl: 1    losartan (COZAAR) 100 MG tablet, Take 1 tablet (100 mg total) by mouth once daily., Disp: 90 tablet, Rfl: 3    meloxicam (MOBIC) 15 MG tablet, TAKE 1 TABLET(15 MG) BY MOUTH DAILY, Disp: 30 tablet, Rfl: 1    multivitamin capsule, Take 1 capsule by mouth once daily., Disp: , Rfl:     SAW PALMETTO ORAL, Take 5,300 mg by mouth once daily., Disp: , Rfl:     sildenafiL (VIAGRA) 25 MG tablet, Take 1 tablet (25 mg total) by mouth daily as needed for Erectile Dysfunction (DAILY PRN)., Disp: 30 tablet, Rfl: 3    HYDROcodone-acetaminophen (NORCO) 5-325 mg per tablet, Take 1 tablet by mouth every 4 (four) hours as needed for Pain. (Patient not taking: Reported on 5/24/2024), Disp: 20 tablet, Rfl: 0  Review of patient's allergies indicates:  No Known Allergies    Ht 5' 4" (1.626 m)   Wt 86.6 kg (190 lb 14.7 oz)   BMI 32.77 kg/m²     Review of Systems   Constitutional: Negative for chills and fever.   HENT:  Negative for congestion and sore throat.    Eyes:  Negative for discharge and double vision. "   Cardiovascular:  Negative for chest pain, palpitations and syncope.   Respiratory:  Negative for cough and shortness of breath.    Endocrine: Negative for cold intolerance and heat intolerance.   Skin:  Negative for dry skin and rash.   Musculoskeletal:  Positive for joint pain, joint swelling and stiffness.   Gastrointestinal:  Negative for abdominal pain, nausea and vomiting.   Neurological:  Negative for focal weakness, numbness and paresthesias.         Objective:    Ortho Exam     right hand:  Significant for CTR and cubital tunnel incisions.   Wound margins are well approximated and healing nicely.   No redness, warmth, drainage, or other signs of infection.   mild swelling.   ROM elbow, wrist, and fingers full.     strength not tested.    Sensation intact.   Pulses present.    GEN: Well developed, well nourished . AAOX3. No acute distress.   Breathing unlabored.  Mood and affect pleasant.     Imaging: None      Assessment/Plan:       1. S/P carpal tunnel release    2. S/P cubital tunnel release    3. Right carpal tunnel syndrome    4. Ulnar tunnel syndrome, right    5. Post-operative state            Wound care: Regular wound care explained with soap and water. Avoid pressure to the palm of the hand. Patient encouraged to continue digit range-of-motion exercises as well as scar massage once wound fully heals.  Pain Management: continue current pain regimen  I explained carpal tunnel symptoms will continue to resolve with time.  Occupational therapy:  Patient does not feel that OT as necessary at this time.  I agree with this.  He will contact us if he changes his  Work status: WB[employer] using pain as a guide. Avoid putting pressure on incision  Follow up: in 4 weeks with Bita Wade PA-C or Dr Leary    All of the patient's questions were answered and the patient will contact us if they have any questions or concerns in the interim.      Bita Wade PA-C  [facility]  Orthopedic Surgery

## 2024-06-03 ENCOUNTER — TELEPHONE (OUTPATIENT)
Dept: INTERNAL MEDICINE | Facility: CLINIC | Age: 59
End: 2024-06-03
Payer: COMMERCIAL

## 2024-06-03 VITALS — SYSTOLIC BLOOD PRESSURE: 123 MMHG | DIASTOLIC BLOOD PRESSURE: 78 MMHG

## 2024-06-04 RX ORDER — SILDENAFIL 25 MG/1
25 TABLET, FILM COATED ORAL DAILY PRN
Qty: 10 TABLET | Refills: 3 | Status: SHIPPED | OUTPATIENT
Start: 2024-06-04

## 2024-06-14 RX ORDER — HYDROCODONE BITARTRATE AND ACETAMINOPHEN 5; 325 MG/1; MG/1
1 TABLET ORAL EVERY 4 HOURS PRN
Qty: 20 TABLET | Refills: 0 | Status: SHIPPED | OUTPATIENT
Start: 2024-06-14

## 2024-06-17 ENCOUNTER — TELEPHONE (OUTPATIENT)
Dept: ORTHOPEDICS | Facility: CLINIC | Age: 59
End: 2024-06-17

## 2024-06-17 ENCOUNTER — OFFICE VISIT (OUTPATIENT)
Dept: ORTHOPEDICS | Facility: CLINIC | Age: 59
End: 2024-06-17
Payer: COMMERCIAL

## 2024-06-17 VITALS — HEIGHT: 64 IN | WEIGHT: 191.81 LBS | BODY MASS INDEX: 32.74 KG/M2

## 2024-06-17 DIAGNOSIS — G56.01 RIGHT CARPAL TUNNEL SYNDROME: Primary | ICD-10-CM

## 2024-06-17 PROCEDURE — 99024 POSTOP FOLLOW-UP VISIT: CPT | Mod: S$GLB,,, | Performed by: ORTHOPAEDIC SURGERY

## 2024-06-17 PROCEDURE — 99999 PR PBB SHADOW E&M-EST. PATIENT-LVL III: CPT | Mod: PBBFAC,,, | Performed by: ORTHOPAEDIC SURGERY

## 2024-06-17 RX ORDER — MELOXICAM 15 MG/1
15 TABLET ORAL DAILY
Qty: 30 TABLET | Refills: 1 | Status: SHIPPED | OUTPATIENT
Start: 2024-06-17

## 2024-06-17 RX ORDER — TRAMADOL HYDROCHLORIDE 50 MG/1
50 TABLET ORAL EVERY 12 HOURS PRN
Qty: 30 TABLET | Refills: 0 | Status: SHIPPED | OUTPATIENT
Start: 2024-06-17 | End: 2024-07-02

## 2024-06-17 RX ORDER — DICLOFENAC SODIUM 10 MG/G
2 GEL TOPICAL 3 TIMES DAILY
Qty: 100 G | Refills: 1 | Status: SHIPPED | OUTPATIENT
Start: 2024-06-17

## 2024-06-17 NOTE — TELEPHONE ENCOUNTER
Spoke with Jerome, in regards to having PT order faxed over to an outside facility. Jerome states, he would like to have a PT completed outside of [facility], and he is on his way to the clinic to discuss this matter. I advised Jerome to send a message via the [facility] portal, as we are currently in clinic and may not have the opportunity to stop and discuss.     Jerome also had questions, in regards to his work note that was completed by Dr. Leary at today's visit. Jerome states that his job does not offer light duty, and why Dr. Leary could not state that he is unable to return to work at this time. I informed patient, unfortunately Dr. Leary is unable to state that he is unable to work, that he can return to light duty, and his job must accommodate. Patient states that he is  for [employer] and there no such thing as light duty. I again advised Jerome that Dr. Leary is unable to state he can't return to work.     Patient will send portal message with the name of Physical Therapy. All questions answered and verbalization expressed

## 2024-06-17 NOTE — PROGRESS NOTES
Subjective:      Patient ID: Jerome Almazan is a 58 y.o. male.  Chief Complaint: Post-op Evaluation and Numbness of the Right Hand (6 week po right ctr) and Post-op Evaluation and Numbness of the Right Elbow (6 week po right ulnar )      HPI  Jerome Almazan is a  58 y.o. male presenting today for post op visit.  He is s/p right ulnar nerve decompression and right carpal tunnel release now 5 weeks postop   The numbness that he had preop in his hands has resolved but he still having little bit of numbness at the elbow and wrist mild soreness and pain in the shoulder area.     Review of patient's allergies indicates:  No Known Allergies      Current Outpatient Medications   Medication Sig Dispense Refill    aspirin (ECOTRIN) 81 MG EC tablet Take 81 mg by mouth once daily.      gabapentin (NEURONTIN) 300 MG capsule TAKE 1 CAPSULE(300 MG) BY MOUTH EVERY EVENING 30 capsule 1    HYDROcodone-acetaminophen (NORCO) 5-325 mg per tablet Take 1 tablet by mouth every 4 (four) hours as needed for Pain. 20 tablet 0    losartan (COZAAR) 100 MG tablet Take 1 tablet (100 mg total) by mouth once daily. 90 tablet 3    multivitamin capsule Take 1 capsule by mouth once daily.      SAW PALMETTO ORAL Take 5,300 mg by mouth once daily.      sildenafiL (VIAGRA) 25 MG tablet Take 1 tablet (25 mg total) by mouth daily as needed for Erectile Dysfunction (DAILY PRN). 10 tablet 3    diclofenac sodium (VOLTAREN) 1 % Gel Apply 2 g topically 3 (three) times daily. 100 g 1    meloxicam (MOBIC) 15 MG tablet Take 1 tablet (15 mg total) by mouth once daily. 30 tablet 1    traMADoL (ULTRAM) 50 mg tablet Take 1 tablet (50 mg total) by mouth every 12 (twelve) hours as needed for Pain. 30 tablet 0     No current facility-administered medications for this visit.       Past Medical History:   Diagnosis Date    Abnormal PSA 4/5/2013    Hypertension        Past Surgical History:   Procedure Laterality Date    CARPAL TUNNEL RELEASE      CARPAL  "TUNNEL RELEASE Right 5/10/2024    Procedure: RELEASE, CARPAL TUNNEL;  Surgeon: Sanjay Leary Jr., MD;  Location: [facility] OR;  Service: Orthopedics;  Laterality: Right;    DECOMPRESSION, NERVE, ULNAR Right 5/10/2024    Procedure: DECOMPRESSION, NERVE, ULNAR;  Surgeon: Sanjay Leary Jr., MD;  Location: [facility] OR;  Service: Orthopedics;  Laterality: Right;    SHOULDER SURGERY         OBJECTIVE:   PHYSICAL EXAM:  Height: 5' 4" (162.6 cm) Weight: 87 kg (191 lb 12.8 oz)  Vitals:    06/17/24 0909   Weight: 87 kg (191 lb 12.8 oz)   Height: 5' 4" (1.626 m)   PainSc: 0-No pain     Ortho/SPM Exam  Examination right elbow and hand both incisions well healed slight swelling minimal tenderness range of motion elbow wrist fingers full sensation intact in the digits    strength decreased    RADIOGRAPHS:  None  Comments: I have personally reviewed the imaging and I agree with the above radiologist's report.    ASSESSMENT/PLAN:     IMPRESSION:  Status post right carpal tunnel release and ulnar nerve decompression    PLAN:  Patient would like physical therapy so I have ordered some OT for his right elbow hand and wrist   He can discontinue the wrist splint   Increase activities as tolerated   Refilled Mobic for daytime use Ultram for nighttime use and Voltaren gel topical   He can return light duty work -no lifting more than 10 lb no climbing ladders or scaffolds      FOLLOW UP:  4-6 weeks    Disclaimer: This note has been generated using voice-recognition software. There may be typographical errors that have been missed during proof-reading.     "

## 2024-06-17 NOTE — TELEPHONE ENCOUNTER
----- Message from Annette Sanders sent at 6/17/2024 10:11 AM CDT -----  Type:  Call back \      Who Called:pt     Does the patient know what this is regarding?:Physical Therapy   Would the patient rather a call back or a response via [facility]? Call   Best Call Back Number:[phone]  Additional Information:

## 2024-07-15 NOTE — PROGRESS NOTES
Subjective:      Patient ID: Jerome Almazan is a 58 y.o. male.  Chief Complaint: Post-op Evaluation (R CTR/Ulnar )    HPI  Jerome Almazan is a  58 y.o. male presenting today for post op visit.    He is s/p right ulnar nerve decompression and right carpal tunnel release now 2mos postop.     Patient reports digit numbness has resolved, and scar tissue pain to the wrist is improving.  However, he still lacks full strength secondary to soreness at the level of the elbow.    Patient working diligently with formal physical therapy on a work functioning program    Review of patient's allergies indicates:  No Known Allergies      Current Outpatient Medications   Medication Sig Dispense Refill    aspirin (ECOTRIN) 81 MG EC tablet Take 81 mg by mouth once daily.      diclofenac sodium (VOLTAREN) 1 % Gel Apply 2 g topically 3 (three) times daily. 100 g 1    gabapentin (NEURONTIN) 300 MG capsule TAKE 1 CAPSULE(300 MG) BY MOUTH EVERY EVENING 30 capsule 1    HYDROcodone-acetaminophen (NORCO) 5-325 mg per tablet Take 1 tablet by mouth every 4 (four) hours as needed for Pain. 20 tablet 0    losartan (COZAAR) 100 MG tablet Take 1 tablet (100 mg total) by mouth once daily. 90 tablet 3    meloxicam (MOBIC) 15 MG tablet Take 1 tablet (15 mg total) by mouth once daily. 30 tablet 1    multivitamin capsule Take 1 capsule by mouth once daily.      SAW PALMETTO ORAL Take 5,300 mg by mouth once daily.      sildenafiL (VIAGRA) 25 MG tablet Take 1 tablet (25 mg total) by mouth daily as needed for Erectile Dysfunction (DAILY PRN). 10 tablet 3     No current facility-administered medications for this visit.       Past Medical History:   Diagnosis Date    Abnormal PSA 4/5/2013    Hypertension        Past Surgical History:   Procedure Laterality Date    CARPAL TUNNEL RELEASE      CARPAL TUNNEL RELEASE Right 5/10/2024    Procedure: RELEASE, CARPAL TUNNEL;  Surgeon: Sanjay Leary Jr., MD;  Location: [facility] OR;  Service:  "Orthopedics;  Laterality: Right;    DECOMPRESSION, NERVE, ULNAR Right 5/10/2024    Procedure: DECOMPRESSION, NERVE, ULNAR;  Surgeon: Sanjay Leary Jr., MD;  Location: [facility];  Service: Orthopedics;  Laterality: Right;    SHOULDER SURGERY         OBJECTIVE:   PHYSICAL EXAM:  Height: 5' 4" (162.6 cm) Weight: 87 kg (191 lb 12.8 oz)  Vitals:    07/16/24 0739   Weight: 87 kg (191 lb 12.8 oz)   Height: 5' 4" (1.626 m)       Ortho/SPM Exam  Examination right elbow and hand   Surgical incisions are well healed with appropriate scar formation  Range of motion elbow wrist fingers full  Sensation intact in the digits    strength decreased somewhat    ASSESSMENT/PLAN:     IMPRESSION:  Status post right carpal tunnel release and ulnar nerve decompression    PLAN:    Patient is exhibiting normal postoperative course following ulnar nerve decompression and carpal tunnel release.    Work status:  Patient relays that he is required to lift  lb intermittently for his job.  He has not able to do that presently.  I anticipate releasing him to full duty in about 4 weeks following completion of PT.    We reviewed continued scar massage with vitamin-E oil or cocoa butter    FOLLOW UP:  4 weeks  "

## 2024-07-16 ENCOUNTER — TELEPHONE (OUTPATIENT)
Dept: ORTHOPEDICS | Facility: CLINIC | Age: 59
End: 2024-07-16

## 2024-07-16 ENCOUNTER — OFFICE VISIT (OUTPATIENT)
Dept: ORTHOPEDICS | Facility: CLINIC | Age: 59
End: 2024-07-16
Payer: COMMERCIAL

## 2024-07-16 VITALS — WEIGHT: 191.81 LBS | BODY MASS INDEX: 32.74 KG/M2 | HEIGHT: 64 IN

## 2024-07-16 DIAGNOSIS — Z98.890 S/P CUBITAL TUNNEL RELEASE: ICD-10-CM

## 2024-07-16 DIAGNOSIS — Z98.890 S/P CARPAL TUNNEL RELEASE: Primary | ICD-10-CM

## 2024-07-16 PROCEDURE — 99024 POSTOP FOLLOW-UP VISIT: CPT | Mod: S$GLB,,, | Performed by: PHYSICIAN ASSISTANT

## 2024-07-16 PROCEDURE — 99999 PR PBB SHADOW E&M-EST. PATIENT-LVL III: CPT | Mod: PBBFAC,,, | Performed by: PHYSICIAN ASSISTANT

## 2024-07-16 NOTE — TELEPHONE ENCOUNTER
----- Message from Ida Baker sent at 7/16/2024  2:41 PM CDT -----  Type:  Needs Medical Advice    Who Called: Pt  Would the patient rather a call back or a response via [facility]? Call back  Best Call Back Number: [phone]  Additional Information: pt is asking about the form he gave to have filled out

## 2024-07-16 NOTE — TELEPHONE ENCOUNTER
Spoke with Jerome, in regards to paperwork he presented to the clinic at today's visit. Patient inquired if paperwork was completed, as he states that [employer] did not receive. I informed Jerome, paperwork takes 7 to 10 business days to be completed. Patient states, no one has told him this prior, as he would have asked for an earlier appointment. I advised patient, he was informed of the 7 to 10 business days policy, as he has been receiving paperwork prior to surgery. Patient was upset, but I informed patient that in addition to clinic we handle paperwork after clinic hours. All questions answered and verbalization expressed.

## 2024-07-19 ENCOUNTER — PATIENT MESSAGE (OUTPATIENT)
Dept: ORTHOPEDICS | Facility: CLINIC | Age: 59
End: 2024-07-19
Payer: COMMERCIAL

## 2024-07-31 DIAGNOSIS — Z12.11 COLON CANCER SCREENING: Primary | ICD-10-CM

## 2024-08-01 ENCOUNTER — TELEPHONE (OUTPATIENT)
Dept: ENDOSCOPY | Facility: HOSPITAL | Age: 59
End: 2024-08-01
Payer: COMMERCIAL

## 2024-08-01 VITALS — WEIGHT: 192 LBS | BODY MASS INDEX: 32.78 KG/M2 | HEIGHT: 64 IN

## 2024-08-01 DIAGNOSIS — Z12.11 COLON CANCER SCREENING: Primary | ICD-10-CM

## 2024-08-01 RX ORDER — SOD SULF/POT CHLORIDE/MAG SULF 1.479 G
12 TABLET ORAL DAILY
Qty: 24 TABLET | Refills: 0 | Status: SHIPPED | OUTPATIENT
Start: 2024-08-01

## 2024-08-01 NOTE — TELEPHONE ENCOUNTER
Spoke to patient to schedule procedure(s) Colonoscopy       Physician to perform procedure(s) Dr. SUSIE Rodriguez  Date of Procedure (s) 8/9/24  Arrival Time 9:00 AM  Time of Procedure(s) 10:00 AM   Location of Procedure(s) [facility] [address] [facility]  Type of Rx Prep sent to patient: Sutab  Instructions provided to patient via [facility]    Patient was informed on the following information and verbalized understanding. Screening questionnaire reviewed with patient and complete. If procedure requires anesthesia, a responsible adult needs to be present to accompany the patient home, patient cannot drive after receiving anesthesia. Appointment details are tentative, especially check-in time. Patient will receive a prep-op call 7 days prior to confirm check-in time for procedure. If applicable the patient should contact their pharmacy to verify Rx for procedure prep is ready for pick-up. Patient was advised to call the scheduling department at [phone] if pharmacy states no Rx is available. Patient was advised to call the endoscopy scheduling department if any questions or concerns arise.      [facility] Scheduling Department

## 2024-08-05 ENCOUNTER — ANESTHESIA EVENT (OUTPATIENT)
Dept: ENDOSCOPY | Facility: HOSPITAL | Age: 59
End: 2024-08-05
Payer: COMMERCIAL

## 2024-08-05 ENCOUNTER — PATIENT MESSAGE (OUTPATIENT)
Dept: ORTHOPEDICS | Facility: CLINIC | Age: 59
End: 2024-08-05
Payer: COMMERCIAL

## 2024-08-05 ENCOUNTER — TELEPHONE (OUTPATIENT)
Dept: ORTHOPEDICS | Facility: CLINIC | Age: 59
End: 2024-08-05
Payer: COMMERCIAL

## 2024-08-06 ENCOUNTER — TELEPHONE (OUTPATIENT)
Dept: ENDOSCOPY | Facility: HOSPITAL | Age: 59
End: 2024-08-06
Payer: COMMERCIAL

## 2024-08-08 ENCOUNTER — TELEPHONE (OUTPATIENT)
Dept: GASTROENTEROLOGY | Facility: CLINIC | Age: 59
End: 2024-08-08
Payer: COMMERCIAL

## 2024-08-08 ENCOUNTER — TELEPHONE (OUTPATIENT)
Dept: ENDOSCOPY | Facility: HOSPITAL | Age: 59
End: 2024-08-08
Payer: COMMERCIAL

## 2024-08-09 ENCOUNTER — HOSPITAL ENCOUNTER (OUTPATIENT)
Facility: HOSPITAL | Age: 59
Discharge: HOME | End: 2024-08-09
Attending: INTERNAL MEDICINE | Admitting: INTERNAL MEDICINE
Payer: COMMERCIAL

## 2024-08-09 ENCOUNTER — ANESTHESIA (OUTPATIENT)
Dept: ENDOSCOPY | Facility: HOSPITAL | Age: 59
End: 2024-08-09
Payer: COMMERCIAL

## 2024-08-09 VITALS
OXYGEN SATURATION: 97 % | SYSTOLIC BLOOD PRESSURE: 145 MMHG | HEIGHT: 64 IN | DIASTOLIC BLOOD PRESSURE: 87 MMHG | RESPIRATION RATE: 19 BRPM | HEART RATE: 66 BPM | BODY MASS INDEX: 32.1 KG/M2 | TEMPERATURE: 98 F | WEIGHT: 188 LBS

## 2024-08-09 DIAGNOSIS — Z12.11 COLON CANCER SCREENING: Primary | ICD-10-CM

## 2024-08-09 DIAGNOSIS — Z12.11 SCREEN FOR COLON CANCER: ICD-10-CM

## 2024-08-09 PROCEDURE — 25000003 PHARM REV CODE 250: Performed by: NURSE ANESTHETIST, CERTIFIED REGISTERED

## 2024-08-09 PROCEDURE — 94761 N-INVAS EAR/PLS OXIMETRY MLT: CPT

## 2024-08-09 PROCEDURE — 37000009 HC ANESTHESIA EA ADD 15 MINS: Performed by: INTERNAL MEDICINE

## 2024-08-09 PROCEDURE — 63600175 PHARM REV CODE 636 W HCPCS: Performed by: NURSE ANESTHETIST, CERTIFIED REGISTERED

## 2024-08-09 PROCEDURE — G0121 COLON CA SCRN NOT HI RSK IND: HCPCS | Performed by: INTERNAL MEDICINE

## 2024-08-09 PROCEDURE — 99900035 HC TECH TIME PER 15 MIN (STAT)

## 2024-08-09 PROCEDURE — G0121 COLON CA SCRN NOT HI RSK IND: HCPCS | Mod: ,,, | Performed by: INTERNAL MEDICINE

## 2024-08-09 PROCEDURE — 37000008 HC ANESTHESIA 1ST 15 MINUTES: Performed by: INTERNAL MEDICINE

## 2024-08-09 RX ORDER — SODIUM CHLORIDE 9 MG/ML
INJECTION, SOLUTION INTRAVENOUS CONTINUOUS
Status: DISCONTINUED | OUTPATIENT
Start: 2024-08-09 | End: 2024-08-09 | Stop reason: HOSPADM

## 2024-08-09 RX ORDER — LIDOCAINE HYDROCHLORIDE 20 MG/ML
INJECTION INTRAVENOUS
Status: DISCONTINUED | OUTPATIENT
Start: 2024-08-09 | End: 2024-08-09

## 2024-08-09 RX ORDER — PROPOFOL 10 MG/ML
VIAL (ML) INTRAVENOUS
Status: DISCONTINUED | OUTPATIENT
Start: 2024-08-09 | End: 2024-08-09

## 2024-08-09 RX ADMIN — LIDOCAINE HYDROCHLORIDE 100 MG: 20 INJECTION INTRAVENOUS at 11:08

## 2024-08-09 RX ADMIN — SODIUM CHLORIDE: 0.9 INJECTION, SOLUTION INTRAVENOUS at 11:08

## 2024-08-09 RX ADMIN — PROPOFOL 70 MG: 10 INJECTION, EMULSION INTRAVENOUS at 11:08

## 2024-08-09 NOTE — H&P
Short Stay Endoscopy History and Physical    PCP - Farzana Harris MD    Procedure - Colonoscopy  Sedation: GA  ASA - per anesthesia  Mallampati - per anesthesia  History of Anesthesia problems - no  Family history Anesthesia problems -  no     HPI:  This is a 58 y.o. male here for evaluation of : Screening for CRC    Reflux - no  Dysphagia - no  Abdominal pain - no  Diarrhea - no    ROS:  Constitutional: No fevers, chills, No weight loss  ENT: No allergies  CV: No chest pain  Pulm: No cough, No shortness of breath  Ophtho: No vision changes  GI: see HPI  Medical History:  has a past medical history of Abnormal PSA (4/5/2013) and Hypertension.    Surgical History:  has a past surgical history that includes Shoulder surgery; Carpal tunnel release; Carpal tunnel release (Right, 5/10/2024); and decompression, nerve, ulnar (Right, 5/10/2024).    Family History: family history includes Diabetes in his brother; Heart disease in his father and mother.. Otherwise no colon cancer, inflammatory bowel disease, or GI malignancies.    Social History:  reports that he has never smoked. He has never used smokeless tobacco. He reports current alcohol use. He reports that he does not use drugs.    Review of patient's allergies indicates:  No Known Allergies    Medications:   Medications Prior to Admission   Medication Sig Dispense Refill Last Dose    aspirin (ECOTRIN) 81 MG EC tablet Take 81 mg by mouth once daily.   Past Week    losartan (COZAAR) 100 MG tablet Take 1 tablet (100 mg total) by mouth once daily. 90 tablet 3 8/8/2024    multivitamin capsule Take 1 capsule by mouth once daily.   8/8/2024    sod sulf-pot chloride-mag sulf (SUTAB) 1.479-0.188- 0.225 gram tablet Take 12 tablets by mouth once daily. Please follow Endoscopy 's instructions. Please apply coupon code. Please apply coupon code. BIN: [id number], PCN: [id number], GROUP: [id number], MEMBER ID: [id number] 24 tablet 0 8/8/2024    diclofenac sodium (VOLTAREN) 1 %  Gel Apply 2 g topically 3 (three) times daily. 100 g 1     gabapentin (NEURONTIN) 300 MG capsule TAKE 1 CAPSULE(300 MG) BY MOUTH EVERY EVENING 30 capsule 1 Unknown    HYDROcodone-acetaminophen (NORCO) 5-325 mg per tablet Take 1 tablet by mouth every 4 (four) hours as needed for Pain. 20 tablet 0 More than a month    meloxicam (MOBIC) 15 MG tablet Take 1 tablet (15 mg total) by mouth once daily. 30 tablet 1     SAW PALMETTO ORAL Take 5,300 mg by mouth once daily.       sildenafiL (VIAGRA) 25 MG tablet Take 1 tablet (25 mg total) by mouth daily as needed for Erectile Dysfunction (DAILY PRN). 10 tablet 3 More than a month       Objective Findings:    Vital Signs: Per nursing notes.    Physical Exam:  General Appearance: Well appearing in no acute distress  Head:   Normocephalic, without obvious abnormality  Eyes:    No scleral icterus  Airway: Open  Neck: No restriction in mobility  Lungs: CTA bilaterally in anterior and posterior fields, no wheezes, no crackles.  Heart:  Regular rate and rhythm, S1, S2 normal, no murmurs heard  Abdomen: Soft, non tender, non distended      Labs:  Lab Results   Component Value Date    WBC 6.08 03/22/2024    HGB 14.3 03/22/2024    HCT 41.9 03/22/2024     03/22/2024    CHOL 119 (L) 03/22/2024    TRIG 76 03/22/2024    HDL 31 (L) 03/22/2024    ALT 29 03/22/2024    AST 22 03/22/2024     03/22/2024    K 4.3 03/22/2024     03/22/2024    CREATININE 1.0 03/22/2024    BUN 18 03/22/2024    CO2 26 03/22/2024    TSH 0.661 03/22/2024    PSA 2.9 03/22/2024    INR 1.1 03/13/2012    HGBA1C 5.6 03/22/2024         I have explained the risks and benefits of endoscopy procedures to the patient including but not limited to bleeding, perforation, infection, and death.    Thank you so much for allowing me to participate in the care of Jerome Almazan    Kaleb Rodriguez MD

## 2024-08-09 NOTE — PROVATION PATIENT INSTRUCTIONS
Discharge Summary/Instructions after an Endoscopic Procedure  Patient Name: Jerome Almazan  Patient MRN: [id number]  Patient YOB: 1965  Friday, August 9, 2024  Kaleb Rodriguez MD  Dear patient,  As a result of recent federal legislation (The Federal Cures Act), you may   receive lab or pathology results from your procedure in your [facility]   account before your physician is able to contact you. Your physician or   their representative will relay the results to you with their   recommendations at their soonest availability.  Thank you,  RESTRICTIONS:  During your procedure today, you received medications for sedation.  These   medications may affect your judgment, balance and coordination.  Therefore,   for 24 hours, you have the following restrictions:   - DO NOT drive a car, operate machinery, make legal/financial decisions,   sign important papers or drink alcohol.    ACTIVITY:  Today: no heavy lifting, straining or running due to procedural   sedation/anesthesia.  The following day: return to full activity including work.  DIET:  Eat and drink normally unless instructed otherwise.     TREATMENT FOR COMMON SIDE EFFECTS:  - Mild abdominal pain, nausea, belching, bloating or excessive gas:  rest,   eat lightly and use a heating pad.  - Sore Throat: treat with throat lozenges and/or gargle with warm salt   water.  - Because air was used during the procedure, expelling large amounts of air   from your rectum or belching is normal.  - If a bowel prep was taken, you may not have a bowel movement for 1-3 days.    This is normal.  SYMPTOMS TO WATCH FOR AND REPORT TO YOUR PHYSICIAN:  1. Abdominal pain or bloating, other than gas cramps.  2. Chest pain.  3. Back pain.  4. Signs of infection such as: chills or fever occurring within 24 hours   after the procedure.  5. Rectal bleeding, which would show as bright red, maroon, or black stools.   (A tablespoon of blood from the rectum is not serious, especially  if   hemorrhoids are present.)  6. Vomiting.  7. Weakness or dizziness.  GO DIRECTLY TO THE NEAREST EMERGENCY ROOM IF YOU HAVE ANY OF THE FOLLOWING:      Difficulty breathing              Chills and/or fever over 101 F   Persistent vomiting and/or vomiting blood   Severe abdominal pain   Severe chest pain   Black, tarry stools   Bleeding- more than one tablespoon   Any other symptom or condition that you feel may need urgent attention  Your doctor recommends these additional instructions:  If any biopsies were taken, your doctors clinic will contact you in 1 to 2   weeks with any results.  - Patient has a contact number available for emergencies.  The signs and   symptoms of potential delayed complications were discussed with the   patient.  Return to normal activities tomorrow.  Written discharge   instructions were provided to the patient.   - Discharge patient to home.   - Resume previous diet.   - Continue present medications.   - Repeat colonoscopy in 10 years for screening purposes.   For questions, problems or results please call your physician - Kaleb Rodriguez MD at Work:  [phone].  [facility], EMERGENCY ROOM PHONE NUMBER: [phone]  IF A COMPLICATION OR EMERGENCY SITUATION ARISES AND YOU ARE UNABLE TO REACH   YOUR PHYSICIAN - GO DIRECTLY TO THE EMERGENCY ROOM.  Kaleb Rodriguez MD  8/9/2024 11:38:40 AM  This report has been verified and signed electronically.  Dear patient,  As a result of recent federal legislation (The Federal Cures Act), you may   receive lab or pathology results from your procedure in your [facility]   account before your physician is able to contact you. Your physician or   their representative will relay the results to you with their   recommendations at their soonest availability.  Thank you,  PROVATION

## 2024-08-19 ENCOUNTER — OFFICE VISIT (OUTPATIENT)
Dept: ORTHOPEDICS | Facility: CLINIC | Age: 59
End: 2024-08-19
Payer: COMMERCIAL

## 2024-08-19 VITALS — WEIGHT: 188.06 LBS | BODY MASS INDEX: 32.11 KG/M2 | HEIGHT: 64 IN

## 2024-08-19 DIAGNOSIS — G56.01 RIGHT CARPAL TUNNEL SYNDROME: Primary | ICD-10-CM

## 2024-08-19 PROCEDURE — 99024 POSTOP FOLLOW-UP VISIT: CPT | Mod: S$GLB,,, | Performed by: ORTHOPAEDIC SURGERY

## 2024-08-19 PROCEDURE — 99999 PR PBB SHADOW E&M-EST. PATIENT-LVL III: CPT | Mod: PBBFAC,,, | Performed by: ORTHOPAEDIC SURGERY

## 2024-08-19 NOTE — PROGRESS NOTES
Subjective:      Patient ID: Jerome Almazan is a 58 y.o. male.  Chief Complaint: Post-op Evaluation of the Right Hand      HPI  Jerome Almazan is a  58 y.o. male presenting today for post op visit.  He is s/p right carpal tunnel release and ulnar nerve decompression right elbow he is now about 3 months postop is doing well he has completed physical therapy no problems reported no numbness reported.     Review of patient's allergies indicates:  No Known Allergies      Current Outpatient Medications   Medication Sig Dispense Refill    aspirin (ECOTRIN) 81 MG EC tablet Take 81 mg by mouth once daily.      diclofenac sodium (VOLTAREN) 1 % Gel Apply 2 g topically 3 (three) times daily. 100 g 1    gabapentin (NEURONTIN) 300 MG capsule TAKE 1 CAPSULE(300 MG) BY MOUTH EVERY EVENING 30 capsule 1    HYDROcodone-acetaminophen (NORCO) 5-325 mg per tablet Take 1 tablet by mouth every 4 (four) hours as needed for Pain. 20 tablet 0    losartan (COZAAR) 100 MG tablet Take 1 tablet (100 mg total) by mouth once daily. 90 tablet 3    meloxicam (MOBIC) 15 MG tablet Take 1 tablet (15 mg total) by mouth once daily. 30 tablet 1    multivitamin capsule Take 1 capsule by mouth once daily.      SAW PALMETTO ORAL Take 5,300 mg by mouth once daily.      sildenafiL (VIAGRA) 25 MG tablet Take 1 tablet (25 mg total) by mouth daily as needed for Erectile Dysfunction (DAILY PRN). 10 tablet 3     No current facility-administered medications for this visit.       Past Medical History:   Diagnosis Date    Abnormal PSA 4/5/2013    Hypertension        Past Surgical History:   Procedure Laterality Date    CARPAL TUNNEL RELEASE      CARPAL TUNNEL RELEASE Right 5/10/2024    Procedure: RELEASE, CARPAL TUNNEL;  Surgeon: Sanjay Leary Jr., MD;  Location: [facility] OR;  Service: Orthopedics;  Laterality: Right;    COLONOSCOPY N/A 8/9/2024    Procedure: COLONOSCOPY;  Surgeon: Kaleb Rodriguez MD;  [facility];  Service:  "Endoscopy;  Laterality: N/A;  Referred by Farzana Harris MD, [employer] patient, ok to schedule per Arturo Vega, surinder -ml  8/6/24- pc complete. DBM    DECOMPRESSION, NERVE, ULNAR Right 5/10/2024    Procedure: DECOMPRESSION, NERVE, ULNAR;  Surgeon: Sanjay Leary Jr., MD;  Location: [facility];  Service: Orthopedics;  Laterality: Right;    SHOULDER SURGERY         OBJECTIVE:   PHYSICAL EXAM:  Height: 5' 4" (162.6 cm) Weight: 85.3 kg (188 lb 0.8 oz)  Vitals:    08/19/24 1355   Weight: 85.3 kg (188 lb 0.8 oz)   Height: 5' 4" (1.626 m)   PainSc: 0-No pain     Ortho/SPM Exam  Examination right arm both incisions well healed at the wrist and hand   Range of motion fingers full strength improved sensation intact    RADIOGRAPHS:  None  Comments: I have personally reviewed the imaging and I agree with the above radiologist's report.    ASSESSMENT/PLAN:     IMPRESSION:  Status post right carpal tunnel release and ulnar nerve decompression at the elbow    PLAN:  I have given him a squeeze ball to work on strengthening increase to full activities he can be released full duty work    FOLLOW UP:  As needed    Disclaimer: This note has been generated using voice-recognition software. There may be typographical errors that have been missed during proof-reading.     "

## 2024-08-26 DIAGNOSIS — I10 PRIMARY HYPERTENSION: ICD-10-CM

## 2024-08-27 RX ORDER — LOSARTAN POTASSIUM 100 MG/1
100 TABLET ORAL DAILY
Qty: 90 TABLET | Refills: 3 | Status: SHIPPED | OUTPATIENT
Start: 2024-08-27 | End: 2025-08-27

## 2024-09-20 ENCOUNTER — TELEPHONE (OUTPATIENT)
Dept: INTERNAL MEDICINE | Facility: CLINIC | Age: 59
End: 2024-09-20
Payer: COMMERCIAL

## 2024-09-21 NOTE — TELEPHONE ENCOUNTER
----- Message from Suly Ortega sent at 4/11/2022  8:59 AM CDT -----  Type:  Patient Requesting Call Back    Who Called:Jerome Almazan  Would the patient rather a call back or a response via [facility]? Call back  Best Call Back Number:[phone]  Additional Information: Patient Requesting Call Back regarding ordering of xrays for urology         Yes

## 2024-10-02 ENCOUNTER — TELEPHONE (OUTPATIENT)
Dept: ORTHOPEDICS | Facility: CLINIC | Age: 59
End: 2024-10-02
Payer: COMMERCIAL

## 2024-10-02 NOTE — TELEPHONE ENCOUNTER
"Spoke with Mr. Almazan, pt c/o of tightness in right arm with swelling in right hand. He states     " My arm, it get tight all the way down to my hand, but my hand gets swollen. This happens mostly at night. I started sleeping with pillows under both my arms to elevated it at night. Strange thing is it happens mostly at night. I'm fine throughout the day, except when I bend my hand."     Sooner appointment offer with PA at our [place] location, pt declined. However, appointment scheduled with MD next available and placed on wait-list. In addition, Mr. Almazan was informed if symptoms  become unbearable, he should follow up at ED or urgent care. He v/u.   "

## 2024-10-02 NOTE — TELEPHONE ENCOUNTER
----- Message from Migdalia sent at 10/2/2024 10:08 AM CDT -----  Type:  Needs Medical Advice    Who Called: Pt   Symptoms (please be specific): right arm feels tight, hand is a little bit swollen and also feels like he can't open it up all the way   How long has patient had these symptoms:  2 and half weeks   Would the patient rather a call back or a response via [facility]? Call back   Best Call Back Number: [phone]

## 2024-10-14 ENCOUNTER — OFFICE VISIT (OUTPATIENT)
Dept: ORTHOPEDICS | Facility: CLINIC | Age: 59
End: 2024-10-14
Payer: COMMERCIAL

## 2024-10-14 DIAGNOSIS — M25.641 STIFFNESS OF RIGHT HAND JOINT: Primary | ICD-10-CM

## 2024-10-14 PROBLEM — G56.01 RIGHT CARPAL TUNNEL SYNDROME: Status: RESOLVED | Noted: 2023-09-22 | Resolved: 2024-10-14

## 2024-10-14 PROCEDURE — 99999 PR PBB SHADOW E&M-EST. PATIENT-LVL II: CPT | Mod: PBBFAC,,, | Performed by: ORTHOPAEDIC SURGERY

## 2024-10-14 PROCEDURE — 99213 OFFICE O/P EST LOW 20 MIN: CPT | Mod: S$GLB,,, | Performed by: ORTHOPAEDIC SURGERY

## 2024-10-14 RX ORDER — NABUMETONE 500 MG/1
500 TABLET, FILM COATED ORAL 2 TIMES DAILY WITH MEALS
Qty: 60 TABLET | Refills: 1 | Status: SHIPPED | OUTPATIENT
Start: 2024-10-14

## 2024-10-14 NOTE — PROGRESS NOTES
Subjective:      Patient ID: Jerome Almazan is a 58 y.o. male.  Chief Complaint: Follow-up (Right arm pain w/ tightness ) and Numbness (Right arm / hand )      HPI  Jerome Almazan is a  58 y.o. male presenting today for follow up of right arm surgery which included carpal tunnel release and ulnar nerve decompression of the elbow.  He reports that he is doing well from the surgery he is now about 5 months postop from the surgery but he has recently had some increased swelling and stiffness in the right hand   Usually worse at work   No history of trauma   Seems to have little bit of tightness but not numbness or tingling like he had prior to surgery.    Review of patient's allergies indicates:  No Known Allergies      Current Outpatient Medications   Medication Sig Dispense Refill    aspirin (ECOTRIN) 81 MG EC tablet Take 81 mg by mouth once daily.      diclofenac sodium (VOLTAREN) 1 % Gel Apply 2 g topically 3 (three) times daily. 100 g 1    gabapentin (NEURONTIN) 300 MG capsule TAKE 1 CAPSULE(300 MG) BY MOUTH EVERY EVENING 30 capsule 1    losartan (COZAAR) 100 MG tablet Take 1 tablet (100 mg total) by mouth once daily. 90 tablet 3    meloxicam (MOBIC) 15 MG tablet Take 1 tablet (15 mg total) by mouth once daily. 30 tablet 1    multivitamin capsule Take 1 capsule by mouth once daily.      HYDROcodone-acetaminophen (NORCO) 5-325 mg per tablet Take 1 tablet by mouth every 4 (four) hours as needed for Pain. 20 tablet 0    nabumetone (RELAFEN) 500 MG tablet Take 1 tablet (500 mg total) by mouth 2 (two) times daily with meals. 60 tablet 1    SAW PALMETTO ORAL Take 5,300 mg by mouth once daily.      sildenafiL (VIAGRA) 25 MG tablet Take 1 tablet (25 mg total) by mouth daily as needed for Erectile Dysfunction (DAILY PRN). 10 tablet 3     No current facility-administered medications for this visit.       Past Medical History:   Diagnosis Date    Abnormal PSA 4/5/2013    Hypertension        Past Surgical  History:   Procedure Laterality Date    CARPAL TUNNEL RELEASE      CARPAL TUNNEL RELEASE Right 5/10/2024    Procedure: RELEASE, CARPAL TUNNEL;  Surgeon: Sanjay Leary Jr., MD;  Location: [facility] OR;  Service: Orthopedics;  Laterality: Right;    COLONOSCOPY N/A 8/9/2024    Procedure: COLONOSCOPY;  Surgeon: Kaleb Rodriguez MD;  Location: [facility] ENDOSCOPY;  Service: Endoscopy;  Laterality: N/A;  Referred by Farzana Harris MD, [employer] patient, ok to schedule per Art[facility] Vega portal -ml  8/6/24- pc complete. DBM    DECOMPRESSION, NERVE, ULNAR Right 5/10/2024    Procedure: DECOMPRESSION, NERVE, ULNAR;  Surgeon: Sanjay Leary Jr., MD;  Location: [facility] OR;  Service: Orthopedics;  Laterality: Right;    SHOULDER SURGERY         OBJECTIVE:   PHYSICAL EXAM:       Vitals:    10/14/24 0857   PainSc: 0-No pain     Ortho/SPM Exam  Examination right hand there is some mild swelling of the MP joints range of motion full  strength improved   Sensation intact  Previous incisions well healed at the elbow and wrist       RADIOGRAPHS:  None  Comments: I have personally reviewed the imaging and I agree with the above radiologist's report.    ASSESSMENT/PLAN:     IMPRESSION:  Right hand stiffness after previous hand and elbow surgery    PLAN:  I recommended that he continue with his home exercise program including use of a squeeze ball   I have also started him on Relafen 500 mg b.i.d. with food   If symptoms fail to improve we will get x-rays next visit  Continue full duty work    FOLLOW UP:  3-4 weeks    Disclaimer: This note has been generated using voice-recognition software. There may be typographical errors that have been missed during proof-reading.

## 2024-10-16 ENCOUNTER — PATIENT MESSAGE (OUTPATIENT)
Dept: ORTHOPEDICS | Facility: CLINIC | Age: 59
End: 2024-10-16
Payer: COMMERCIAL

## 2024-10-25 ENCOUNTER — PATIENT MESSAGE (OUTPATIENT)
Dept: ORTHOPEDICS | Facility: CLINIC | Age: 59
End: 2024-10-25
Payer: COMMERCIAL

## 2024-11-11 RX ORDER — MELOXICAM 15 MG/1
15 TABLET ORAL DAILY
Qty: 30 TABLET | Refills: 1 | Status: SHIPPED | OUTPATIENT
Start: 2024-11-11

## 2024-11-15 ENCOUNTER — HOSPITAL ENCOUNTER (OUTPATIENT)
Dept: RADIOLOGY | Facility: HOSPITAL | Age: 59
Discharge: HOME | End: 2024-11-15
Payer: COMMERCIAL

## 2024-11-15 ENCOUNTER — OFFICE VISIT (OUTPATIENT)
Dept: ORTHOPEDICS | Facility: CLINIC | Age: 59
End: 2024-11-15
Payer: COMMERCIAL

## 2024-11-15 VITALS — WEIGHT: 188.06 LBS | HEIGHT: 64 IN | BODY MASS INDEX: 32.11 KG/M2

## 2024-11-15 DIAGNOSIS — R52 PAIN: ICD-10-CM

## 2024-11-15 DIAGNOSIS — G56.01 RIGHT CARPAL TUNNEL SYNDROME: ICD-10-CM

## 2024-11-15 DIAGNOSIS — M65.351 TRIGGER LITTLE FINGER OF RIGHT HAND: ICD-10-CM

## 2024-11-15 DIAGNOSIS — Z98.890 S/P CUBITAL TUNNEL RELEASE: ICD-10-CM

## 2024-11-15 DIAGNOSIS — R52 PAIN: Primary | ICD-10-CM

## 2024-11-15 DIAGNOSIS — G56.21 ULNAR TUNNEL SYNDROME, RIGHT: ICD-10-CM

## 2024-11-15 DIAGNOSIS — M65.331 TRIGGER MIDDLE FINGER OF RIGHT HAND: ICD-10-CM

## 2024-11-15 DIAGNOSIS — M65.321 TRIGGER INDEX FINGER OF RIGHT HAND: ICD-10-CM

## 2024-11-15 DIAGNOSIS — Z98.890 S/P CARPAL TUNNEL RELEASE: ICD-10-CM

## 2024-11-15 DIAGNOSIS — M65.341 TRIGGER RING FINGER OF RIGHT HAND: ICD-10-CM

## 2024-11-15 DIAGNOSIS — M25.641 STIFFNESS OF RIGHT HAND JOINT: Primary | ICD-10-CM

## 2024-11-15 PROCEDURE — 73130 X-RAY EXAM OF HAND: CPT | Mod: 26,RT,, | Performed by: RADIOLOGY

## 2024-11-15 PROCEDURE — 73130 X-RAY EXAM OF HAND: CPT | Mod: TC,PN,RT

## 2024-11-15 PROCEDURE — 99214 OFFICE O/P EST MOD 30 MIN: CPT | Mod: 25,S$GLB,,

## 2024-11-15 PROCEDURE — 99999 PR PBB SHADOW E&M-EST. PATIENT-LVL III: CPT | Mod: PBBFAC,,,

## 2024-11-15 PROCEDURE — 20550 NJX 1 TENDON SHEATH/LIGAMENT: CPT | Mod: RT,S$GLB,,

## 2024-11-15 RX ADMIN — TRIAMCINOLONE ACETONIDE 20 MG: 40 INJECTION, SUSPENSION INTRA-ARTICULAR; INTRAMUSCULAR at 10:11

## 2024-11-15 NOTE — PROGRESS NOTES
Subjective:      Patient ID: Jerome Almazan is a 59 y.o. male.  Chief Complaint: No chief complaint on file.      HPI  Jerome Almazan is a  59 y.o. male presenting today for follow up of right arm surgery (CTR and ulnar nerve decompression at the elbow)   Surgeon: French   Date of surgery:  05/10/2024 (6 months)  History of increased swelling and stiffness to the right hand  Numbness and tingling have resolved since surgery  Worse at work   Current treatment:  HEP with squeeze ball and NSAIDs  New triggering/locking of the R index, middle, ring, and small fingers x 2 months  worse at night and after waking in the am  CSI given to R small PIP at previous appt (10/14/24) with transient relief for a few weeks        Review of patient's allergies indicates:  No Known Allergies      Current Outpatient Medications   Medication Sig Dispense Refill    aspirin (ECOTRIN) 81 MG EC tablet Take 81 mg by mouth once daily.      diclofenac sodium (VOLTAREN) 1 % Gel Apply 2 g topically 3 (three) times daily. 100 g 1    gabapentin (NEURONTIN) 300 MG capsule TAKE 1 CAPSULE(300 MG) BY MOUTH EVERY EVENING 30 capsule 1    HYDROcodone-acetaminophen (NORCO) 5-325 mg per tablet Take 1 tablet by mouth every 4 (four) hours as needed for Pain. 20 tablet 0    losartan (COZAAR) 100 MG tablet Take 1 tablet (100 mg total) by mouth once daily. 90 tablet 3    meloxicam (MOBIC) 15 MG tablet Take 1 tablet (15 mg total) by mouth once daily. 30 tablet 1    multivitamin capsule Take 1 capsule by mouth once daily.      nabumetone (RELAFEN) 500 MG tablet Take 1 tablet (500 mg total) by mouth 2 (two) times daily with meals. 60 tablet 1    SAW PALMETTO ORAL Take 5,300 mg by mouth once daily.      sildenafiL (VIAGRA) 25 MG tablet Take 1 tablet (25 mg total) by mouth daily as needed for Erectile Dysfunction (DAILY PRN). 10 tablet 3     No current facility-administered medications for this visit.       Past Medical History:   Diagnosis Date     Abnormal PSA 4/5/2013    Hypertension        Past Surgical History:   Procedure Laterality Date    CARPAL TUNNEL RELEASE      CARPAL TUNNEL RELEASE Right 5/10/2024    Procedure: RELEASE, CARPAL TUNNEL;  Surgeon: Sanjay Leary Jr., MD;  Location: [facility] OR;  Service: Orthopedics;  Laterality: Right;    COLONOSCOPY N/A 8/9/2024    Procedure: COLONOSCOPY;  Surgeon: Kaleb Rodriguez MD;  Location: [facility] ENDOSCOPY;  Service: Endoscopy;  Laterality: N/A;  Referred by Farzana Harris MD, [facility], ok to schedule per [facility] portal -ml  8/6/24- pc complete. DBM    DECOMPRESSION, NERVE, ULNAR Right 5/10/2024    Procedure: DECOMPRESSION, NERVE, ULNAR;  Surgeon: Sanjay Leary Jr., MD;  Location: [facility] OR;  Service: Orthopedics;  Laterality: Right;    SHOULDER SURGERY         OBJECTIVE:   PHYSICAL EXAM:       There were no vitals filed for this visit.    Ortho/SPM Exam  Examination right hand   mild generalized swelling of the MP joints   range of motion full   Palpable nodule to A1 pulley of index, middle, ring, and small fingers  Triggering notable on exam to index, middle, ring, and small fingers  Minimal TTP   strength 5/5   Sensation intact  Previous incisions well healed at the elbow and wrist       RADIOGRAPHS:  IMAGING- I independently interpreted the patient's imaging. Xrays of the patient's R hand demonstrate no evidence of any acute fractures or dislocations or significant degenerative changes.      ASSESSMENT/PLAN:   Plan: The patient and I had a thorough discussion today.  We discussed the working diagnosis as well as several other potential alternative diagnoses.    We discussed conservative and surgical treatment options. Conservative options include rest, activity modifications, workplace modifications, po and topical analgesia (OTC and rx), formal or home PT, and others. Surgical treatment was also mentioned.    At this time, the patient would like to proceed with the following,  which I agree with.    Pain Management: OTC analgesia prn for pain. Precautions advised. Pt expressed understanding  Therapy: discussed minimizing HEP and squeeze ball use, as this may be contributing to to overuse/swelling  Procedures: CSI given for R  index, middle, ring, and small trigger fingers  Work status: WBAT with limitation secondary to pain  Follow up: in 2-3 month(s) with Bita Wade PA-C or Dr Leary    All of the patient's questions were answered and the patient will contact us if they have any questions or concerns in the interim.      Bita Wade PA-C  [facility]  Orthopedic Surgery      Disclaimer: This note has been generated using voice-recognition software. There may be typographical errors that have been missed during proof-reading.

## 2024-11-16 RX ORDER — TRIAMCINOLONE ACETONIDE 40 MG/ML
20 INJECTION, SUSPENSION INTRA-ARTICULAR; INTRAMUSCULAR
Status: DISCONTINUED | OUTPATIENT
Start: 2024-11-15 | End: 2024-11-16 | Stop reason: HOSPADM

## 2024-11-16 NOTE — PROCEDURES
Tendon Sheath    Date/Time: 11/15/2024 10:00 AM    Performed by: Bita Wade PA-C  Authorized by: Bita Wade PA-C    Consent Done?:  Yes (Verbal)  Indications:  Pain and joint swelling  Site marked: the procedure site was marked    Timeout: prior to procedure the correct patient, procedure, and site was verified    Prep: patient was prepped and draped in usual sterile fashion      Location:  Index finger  Site:  R index flexor tendon sheath  Ultrasonic guidance for needle placement?: No    Needle size:  25 G  Approach:  Volar  Medications:  20 mg triamcinolone acetonide 40 mg/mL  Patient tolerance:  Patient tolerated the procedure well with no immediate complications

## 2024-11-16 NOTE — PROCEDURES
Tendon Sheath    Date/Time: 11/15/2024 10:00 AM    Performed by: Bita Wade PA-C  Authorized by: Bita Wade PA-C    Consent Done?:  Yes (Verbal)  Indications:  Pain and joint swelling  Site marked: the procedure site was marked    Timeout: prior to procedure the correct patient, procedure, and site was verified    Prep: patient was prepped and draped in usual sterile fashion      Location:  Ring finger  Site:  R ring flexor tendon sheath  Ultrasonic guidance for needle placement?: No    Needle size:  25 G  Approach:  Volar  Medications:  20 mg triamcinolone acetonide 40 mg/mL  Patient tolerance:  Patient tolerated the procedure well with no immediate complications

## 2024-11-16 NOTE — PROCEDURES
Tendon Sheath    Date/Time: 11/15/2024 10:00 AM    Performed by: Bita Wade PA-C  Authorized by: Bita Wade PA-C    Consent Done?:  Yes (Verbal)  Indications:  Pain and joint swelling  Site marked: the procedure site was marked    Timeout: prior to procedure the correct patient, procedure, and site was verified    Prep: patient was prepped and draped in usual sterile fashion      Location:  Long finger  Site:  R long flexor tendon sheath  Ultrasonic guidance for needle placement?: No    Needle size:  25 G  Approach:  Volar  Medications:  20 mg triamcinolone acetonide 40 mg/mL  Patient tolerance:  Patient tolerated the procedure well with no immediate complications

## 2024-11-16 NOTE — PROCEDURES
Tendon Sheath    Date/Time: 11/15/2024 10:00 AM    Performed by: Bita Wade PA-C  Authorized by: Bita Wade PA-C    Consent Done?:  Yes (Verbal)  Indications:  Pain and joint swelling  Site marked: the procedure site was marked    Timeout: prior to procedure the correct patient, procedure, and site was verified    Prep: patient was prepped and draped in usual sterile fashion      Location:  Small finger  Site:  R small flexor tendon sheath  Ultrasonic guidance for needle placement?: No    Needle size:  25 G  Approach:  Volar  Medications:  20 mg triamcinolone acetonide 40 mg/mL  Patient tolerance:  Patient tolerated the procedure well with no immediate complications

## 2024-12-18 DIAGNOSIS — I10 PRIMARY HYPERTENSION: ICD-10-CM

## 2024-12-18 RX ORDER — LOSARTAN POTASSIUM 100 MG/1
100 TABLET ORAL DAILY
Qty: 30 TABLET | Refills: 0 | Status: SHIPPED | OUTPATIENT
Start: 2024-12-18 | End: 2025-12-18

## 2025-01-17 DIAGNOSIS — I10 PRIMARY HYPERTENSION: ICD-10-CM

## 2025-01-17 RX ORDER — LOSARTAN POTASSIUM 100 MG/1
TABLET ORAL
Qty: 30 TABLET | Refills: 0 | OUTPATIENT
Start: 2025-01-17

## 2025-02-07 ENCOUNTER — OFFICE VISIT (OUTPATIENT)
Dept: INTERNAL MEDICINE | Facility: CLINIC | Age: 60
End: 2025-02-07
Payer: COMMERCIAL

## 2025-02-07 ENCOUNTER — CLINICAL SUPPORT (OUTPATIENT)
Dept: INTERNAL MEDICINE | Facility: CLINIC | Age: 60
End: 2025-02-07
Payer: COMMERCIAL

## 2025-02-07 ENCOUNTER — HOSPITAL ENCOUNTER (OUTPATIENT)
Dept: CARDIOLOGY | Facility: CLINIC | Age: 60
Discharge: HOME | End: 2025-02-07
Payer: COMMERCIAL

## 2025-02-07 ENCOUNTER — HOSPITAL ENCOUNTER (OUTPATIENT)
Dept: RADIOLOGY | Facility: HOSPITAL | Age: 60
Discharge: HOME | End: 2025-02-07
Attending: STUDENT IN AN ORGANIZED HEALTH CARE EDUCATION/TRAINING PROGRAM
Payer: COMMERCIAL

## 2025-02-07 VITALS
WEIGHT: 194.88 LBS | DIASTOLIC BLOOD PRESSURE: 88 MMHG | HEART RATE: 68 BPM | BODY MASS INDEX: 33.27 KG/M2 | HEIGHT: 64 IN | SYSTOLIC BLOOD PRESSURE: 139 MMHG

## 2025-02-07 DIAGNOSIS — I10 PRIMARY HYPERTENSION: ICD-10-CM

## 2025-02-07 DIAGNOSIS — Z00.00 HEALTHCARE MAINTENANCE: ICD-10-CM

## 2025-02-07 DIAGNOSIS — Z00.00 ROUTINE GENERAL MEDICAL EXAMINATION AT A HEALTH CARE FACILITY: Primary | ICD-10-CM

## 2025-02-07 DIAGNOSIS — Q61.02 MULTIPLE RENAL CYSTS: ICD-10-CM

## 2025-02-07 DIAGNOSIS — G47.33 OSA ON CPAP: ICD-10-CM

## 2025-02-07 DIAGNOSIS — Z00.00 ROUTINE GENERAL MEDICAL EXAMINATION AT A HEALTH CARE FACILITY: ICD-10-CM

## 2025-02-07 DIAGNOSIS — I10 HYPERTENSION, ESSENTIAL: ICD-10-CM

## 2025-02-07 DIAGNOSIS — G47.33 OSA (OBSTRUCTIVE SLEEP APNEA): ICD-10-CM

## 2025-02-07 PROBLEM — D22.9 BENIGN NEVUS OF SKIN: Status: RESOLVED | Noted: 2023-09-22 | Resolved: 2025-02-07

## 2025-02-07 PROBLEM — U09.9 LONG COVID: Status: RESOLVED | Noted: 2022-09-20 | Resolved: 2025-02-07

## 2025-02-07 PROBLEM — Z86.16 PERSONAL HISTORY OF COVID-19: Status: RESOLVED | Noted: 2022-09-20 | Resolved: 2025-02-07

## 2025-02-07 PROBLEM — M25.641 STIFFNESS OF RIGHT HAND JOINT: Status: RESOLVED | Noted: 2024-10-14 | Resolved: 2025-02-07

## 2025-02-07 LAB
ALBUMIN SERPL BCP-MCNC: 4.5 G/DL (ref 3.5–5.2)
ALP SERPL-CCNC: 44 U/L (ref 40–150)
ALT SERPL W/O P-5'-P-CCNC: 35 U/L (ref 10–44)
ANION GAP SERPL CALC-SCNC: 9 MMOL/L (ref 8–16)
AST SERPL-CCNC: 23 U/L (ref 10–40)
BILIRUB SERPL-MCNC: 0.7 MG/DL (ref 0.1–1)
BUN SERPL-MCNC: 23 MG/DL (ref 8–23)
CALCIUM SERPL-MCNC: 10.1 MG/DL (ref 8.7–10.5)
CHLORIDE SERPL-SCNC: 105 MMOL/L (ref 95–110)
CHOLEST SERPL-MCNC: 162 MG/DL (ref 120–199)
CHOLEST/HDLC SERPL: 4.5 {RATIO} (ref 2–5)
CO2 SERPL-SCNC: 27 MMOL/L (ref 23–29)
COMPLEXED PSA SERPL-MCNC: 3.4 NG/ML (ref 0–4)
CREAT SERPL-MCNC: 1.1 MG/DL (ref 0.5–1.4)
ERYTHROCYTE [DISTWIDTH] IN BLOOD BY AUTOMATED COUNT: 12.8 % (ref 11.5–14.5)
EST. GFR  (NO RACE VARIABLE): >60 ML/MIN/1.73 M^2
ESTIMATED AVG GLUCOSE: 114 MG/DL (ref 68–131)
GLUCOSE SERPL-MCNC: 99 MG/DL (ref 70–110)
HBA1C MFR BLD: 5.6 % (ref 4–5.6)
HCT VFR BLD AUTO: 42.2 % (ref 40–54)
HDLC SERPL-MCNC: 36 MG/DL (ref 40–75)
HDLC SERPL: 22.2 % (ref 20–50)
HGB BLD-MCNC: 14 G/DL (ref 14–18)
LDLC SERPL CALC-MCNC: 96 MG/DL (ref 63–159)
MCH RBC QN AUTO: 30.2 PG (ref 27–31)
MCHC RBC AUTO-ENTMCNC: 33.2 G/DL (ref 32–36)
MCV RBC AUTO: 91 FL (ref 82–98)
NONHDLC SERPL-MCNC: 126 MG/DL
OHS QRS DURATION: 94 MS
OHS QTC CALCULATION: 464 MS
PLATELET # BLD AUTO: 267 K/UL (ref 150–450)
PMV BLD AUTO: 9.4 FL (ref 9.2–12.9)
POTASSIUM SERPL-SCNC: 4.8 MMOL/L (ref 3.5–5.1)
PROT SERPL-MCNC: 7.7 G/DL (ref 6–8.4)
RBC # BLD AUTO: 4.63 M/UL (ref 4.6–6.2)
SODIUM SERPL-SCNC: 141 MMOL/L (ref 136–145)
TRIGL SERPL-MCNC: 150 MG/DL (ref 30–150)
TSH SERPL DL<=0.005 MIU/L-ACNC: 0.91 UIU/ML (ref 0.4–4)
WBC # BLD AUTO: 7.12 K/UL (ref 3.9–12.7)

## 2025-02-07 PROCEDURE — 80053 COMPREHEN METABOLIC PANEL: CPT | Performed by: STUDENT IN AN ORGANIZED HEALTH CARE EDUCATION/TRAINING PROGRAM

## 2025-02-07 PROCEDURE — 99999 PR PBB SHADOW E&M-EST. PATIENT-LVL IV: CPT | Mod: PBBFAC,,, | Performed by: STUDENT IN AN ORGANIZED HEALTH CARE EDUCATION/TRAINING PROGRAM

## 2025-02-07 PROCEDURE — 84153 ASSAY OF PSA TOTAL: CPT | Performed by: STUDENT IN AN ORGANIZED HEALTH CARE EDUCATION/TRAINING PROGRAM

## 2025-02-07 PROCEDURE — 71046 X-RAY EXAM CHEST 2 VIEWS: CPT | Mod: 26,,, | Performed by: RADIOLOGY

## 2025-02-07 PROCEDURE — 80061 LIPID PANEL: CPT | Performed by: STUDENT IN AN ORGANIZED HEALTH CARE EDUCATION/TRAINING PROGRAM

## 2025-02-07 PROCEDURE — 99396 PREV VISIT EST AGE 40-64: CPT | Mod: S$GLB,,, | Performed by: STUDENT IN AN ORGANIZED HEALTH CARE EDUCATION/TRAINING PROGRAM

## 2025-02-07 PROCEDURE — 84443 ASSAY THYROID STIM HORMONE: CPT | Performed by: STUDENT IN AN ORGANIZED HEALTH CARE EDUCATION/TRAINING PROGRAM

## 2025-02-07 PROCEDURE — 71046 X-RAY EXAM CHEST 2 VIEWS: CPT | Mod: TC,FY

## 2025-02-07 PROCEDURE — 83036 HEMOGLOBIN GLYCOSYLATED A1C: CPT | Performed by: STUDENT IN AN ORGANIZED HEALTH CARE EDUCATION/TRAINING PROGRAM

## 2025-02-07 PROCEDURE — 93010 ELECTROCARDIOGRAM REPORT: CPT | Mod: S$GLB,,, | Performed by: INTERNAL MEDICINE

## 2025-02-07 PROCEDURE — 93005 ELECTROCARDIOGRAM TRACING: CPT | Mod: S$GLB,,, | Performed by: STUDENT IN AN ORGANIZED HEALTH CARE EDUCATION/TRAINING PROGRAM

## 2025-02-07 PROCEDURE — 85027 COMPLETE CBC AUTOMATED: CPT | Performed by: STUDENT IN AN ORGANIZED HEALTH CARE EDUCATION/TRAINING PROGRAM

## 2025-02-07 RX ORDER — LOSARTAN POTASSIUM 100 MG/1
100 TABLET ORAL DAILY
Qty: 90 TABLET | Refills: 3 | Status: SHIPPED | OUTPATIENT
Start: 2025-02-07 | End: 2025-02-20 | Stop reason: SDUPTHER

## 2025-02-07 NOTE — ASSESSMENT & PLAN NOTE
Controlled.  Will continue same management:  Losartan 100 mg qd  Refilled    Recommended to monitor blood pressure regularly, eat a well-balanced diet that's low in salt, DASH diet, enjoy regular physical activity, manage stress, maintain a healthy weight, take medications properly.

## 2025-02-07 NOTE — PROGRESS NOTES
"Subjective:       Patient ID: Jerome Almazan is a 59 y.o. male.    Chief Complaint: [facility]    HPI    Jerome Almazan is a 59 y.o. male , English speaking, with a history of:  HTN  LUCY  Carpal tunnel with h/o BL repair  R shoulder pain and "torned biceps"  LUCY on CPAP      [Local Patient]  Originally from [place]  Lives in: [facility] [id number]       Patient comes to the clinic for a general physical exam (Annual Wellness Visit) through [facility].    Patient is currently asymptomatic and has no complaints.    He reports that during 2024 he had 2 surgeries:  Carpal tunnel release and ulnar nerve decompression.      After the surgery he experience trigger finger of the right hand, but this has resolved.    His blood pressure average has remained around 130/70.  He continues to take losartan for blood pressure.      Patient gained weight and he admits this is due to diet (sweets)    Wt Readings from Last 3 Encounters:   02/07/25 88.4 kg (194 lb 14.2 oz)   11/15/24 85.3 kg (188 lb 0.8 oz)   08/19/24 85.3 kg (188 lb 0.8 oz)     Patient denies CV symptoms, CP, SOB, palpitations.  Patient denies constitutional symptoms, fever, changes in the urine or stool.    He continues to work out, he lifts weights, he has a plan to try to lose weight.    No other complaints      Latest PCP visits:      9/2/23 AWV through [facility] - elevated PSA and Cr.     Changes in health or medications: No    Specialists visits and recommendations:        H/o ER or Urgent care visits:   NO    H/o Hospitalizations:  NO    H/o falls: None     Life events / lifestyle:   Nothing new      Most recent / available laboratories and studies reviewed with the patient:    Recent Labs   Lab 10/19/23  0846 03/22/24  1005 02/07/25  0812   WBC 6.98 6.08 7.12   Hemoglobin 14.6 14.3 14.0   Hematocrit 42.4 41.9 42.2   MCV 88 92 91   Platelets 234 256 267       Recent Labs   Lab 02/11/22  0753 04/08/22  1036 09/22/23  0824 " 10/19/23  0846 03/22/24  1005 02/07/25  0812   Glucose 103 94   < > 102 93 99   Sodium 142 141   < > 140 138 141   Potassium 4.0 4.3   < > 4.7 4.3 4.8   BUN 20 23 H   < > 26 H 18 23   Creatinine 1.1 1.1   < > 1.3 1.0 1.1   eGFR if non  >60.0 >60  --   --   --   --    Total Bilirubin 0.7  --    < > 0.5 0.6 0.7   AST 29  --    < > 23 22 23   ALT 49 H  --    < > 34 29 35    < > = values in this interval not displayed.       Recent Labs   Lab 10/19/23  0846 03/22/24  1005 02/07/25  0812   Hemoglobin A1C 5.5 5.6 5.6       Recent Labs   Lab 10/19/23  0846 03/22/24  1005 02/07/25  0812   Cholesterol 125 119 L 162   Triglycerides 65 76 150   HDL 35 L 31 L 36 L   LDL Cholesterol 77.0 72.8 96.0   Non-HDL Cholesterol 90 88 126       Recent Labs   Lab 09/22/23  0824 10/06/23  0710 03/22/24  1005 02/07/25  0812   TSH 0.606  --  0.661 0.911   PSA, Screen 4.8 H 3.2 2.9 3.4       Recent Labs   Lab 09/22/23  0824 10/19/23  0846   Hepatitis C Ab Non-reactive Non-reactive       Results for orders placed or performed during the hospital encounter of 03/22/24   EKG 12-lead    Collection Time: 03/22/24 10:15 AM   Result Value Ref Range    QRS Duration 82 ms    [facility] QTC Calculation 407 ms    Narrative    Test Reason : Z00.00,    Vent. Rate : 065 BPM     Atrial Rate : 065 BPM     P-R Int : 152 ms          QRS Dur : 082 ms      QT Int : 392 ms       P-R-T Axes : 036 -12 032 degrees     QTc Int : 407 ms    Normal sinus rhythm  Normal ECG  When compared with ECG of 22-SEP-2023 08:41,  No significant change was found  Confirmed by Abbie De La Vega MD ([id number]) on 3/22/2024 11:25:59 AM    Referred By: OSIEL TSAI           Confirmed By:Abbie De La Vega MD       X-Ray Chest PA And Lateral  Narrative: EXAMINATION:  XR CHEST PA AND LATERAL    CLINICAL HISTORY:  Encounter for general adult medical examination without abnormal findings    TECHNIQUE:  PA and lateral views of the chest were performed.    COMPARISON:  N 09/20/2022  one    FINDINGS:  Heart size normal.  The lungs are clear.  No pleural effusion  Impression: See above    Electronically signed by: Polo Stein MD  Date:    02/07/2025  Time:    10:21       Current medications:    Current Outpatient Medications:     aspirin (ECOTRIN) 81 MG EC tablet, Take 81 mg by mouth once daily., Disp: , Rfl:     multivitamin capsule, Take 1 capsule by mouth once daily., Disp: , Rfl:     losartan (COZAAR) 100 MG tablet, Take 1 tablet (100 mg total) by mouth once daily., Disp: 90 tablet, Rfl: 3      Healthcare Maintenance:  Colon cancer screening:   Last Colonoscopy completed on 8/9/2024     Vaccinations:        Tetanus: 2016       Pneumonia: N/A       Zoster: 2021       Influenza: no       COVID vaccination: UNVACCINATED     Depression screening: PHQ2 score = 0     Annual Wellness visit approx. Month: September    Past Medical History reviewed and updated:    Past Medical History:   Diagnosis Date    Abnormal PSA 4/5/2013    Hypertension        Past Surgical History:   Procedure Laterality Date    CARPAL TUNNEL RELEASE      CARPAL TUNNEL RELEASE Right 5/10/2024    Procedure: RELEASE, CARPAL TUNNEL;  Surgeon: Sanjay Leary Jr., MD;  Location: [facility] OR;  Service: Orthopedics;  Laterality: Right;    COLONOSCOPY N/A 8/9/2024    Procedure: COLONOSCOPY;  Surgeon: Kaleb Rodriguez MD;  Location: [facility] ENDOSCOPY;  Service: Endoscopy;  Laterality: N/A;  Referred by Farzana Harris MD, [facility], ok to schedule per [facility] portal -ml  8/6/24- pc complete. DBM    DECOMPRESSION, NERVE, ULNAR Right 5/10/2024    Procedure: DECOMPRESSION, NERVE, ULNAR;  Surgeon: Sanjay Leary Jr., MD;  Location: [facility] OR;  Service: Orthopedics;  Laterality: Right;    SHOULDER SURGERY         Family History   Problem Relation Name Age of Onset    Heart disease Mother      Heart disease Father      Diabetes Brother      Prostate cancer Neg Hx      Colon cancer Neg Hx         ROS  11-point review of systems  "done. Negative except for detailed in the HPI.        Objective:      /88   Pulse 68   Ht 5' 4" (1.626 m)   Wt 88.4 kg (194 lb 14.2 oz)   BMI 33.45 kg/m²      Physical Exam   General appearance: Good general aspect, NAD, conversant   Eyes and HEENT: Normal sclerae, moist mucous membranes, no thyromegaly or lymphadenopathy  Respiratory: No work of breathing, clear to auscultation bilaterally. No rales, rhonchi, wheezing, or rubs.  Cardiovascular: PMI not displaced. RRR. Normal S1, S2. No murmurs, rubs or gallops.  Abdomen and : Soft, non-tender, nondistended, BS, no hepatosplenomegaly, no masses.  Extremities: no edemas, no extremity lymphadenopathy, no clubbing, no cyanosis.  Nervous System: Alert and oriented x 3, good concentration, no deficits.  Skin: Normal temperature, turgor and texture; no rash, ulcers or subcutaneous nodules  Psych: Appropriate affect, alert and oriented to person, place and time          Assessment:       1. Routine general medical examination at a health care facility  Assessment & Plan:  Patient is in overall good general health.  Stable medical conditions listed on the PMH.  Labs reviewed and patient notified.        2. Hypertension, essential  Assessment & Plan:  Controlled.  Will continue same management:  Losartan 100 mg qd  Refilled    Recommended to monitor blood pressure regularly, eat a well-balanced diet that's low in salt, DASH diet, enjoy regular physical activity, manage stress, maintain a healthy weight, take medications properly.        3. Primary hypertension  -     losartan (COZAAR) 100 MG tablet; Take 1 tablet (100 mg total) by mouth once daily.  Dispense: 90 tablet; Refill: 3    4. Multiple renal cysts  Overview:  On GUICHO 10/23    Orders:  -     US Retroperitoneal Complete; Future; Expected date: 02/07/2025    5. Body mass index (BMI) of 31.0 to 31.9 in adult  Assessment & Plan:  Weight gain noted    Today's weight: 88.4 kg (194 lb 14.2 oz)  Today's BMI: Body " mass index is 33.45 kg/m².  Wt Readings from Last 3 Encounters:   02/07/25 88.4 kg (194 lb 14.2 oz)   11/15/24 85.3 kg (188 lb 0.8 oz)   08/19/24 85.3 kg (188 lb 0.8 oz)     Weight loss recommended      6. LUCY (obstructive sleep apnea)  Assessment & Plan:  Patient is adherent to his CPAP, he would like a f/u with sleep medicine.  I have placed the referral.      7. LUCY on CPAP  -     Ambulatory referral/consult to Sleep Disorders; Future; Expected date: 02/14/2025    8. Healthcare maintenance  Assessment & Plan:  Health care maintenance and core gaps reviewed and assessed with patient.  Vaccinations recommended:        Tetanus - 2016       Shingles - N/A       Influenza - N/A       Pneumonia - N/A  Colon cancer screening:   Colonoscopy: O  Lifestyle recommendations given.  Physical activity recommended.    Legend: Ordered (O), Declined (D), Current (C)           Summary of orders / plan:         Continue same management.    Losartan refilled.    Referral to sleep Medicine            There are no Patient Instructions on file for this visit.         Problem list updated  Medications reconciled    Education provided  Lifestyle recommendations given  AVS generated, explained, and sent to the patient.  RTC in : 1 year for annual wellness visit  Labs: Not ordered yet            FORREST JOSEPH MD, MPH  Internal Medicine  [facility]  [facility]

## 2025-02-07 NOTE — ASSESSMENT & PLAN NOTE
Weight gain noted    Today's weight: 88.4 kg (194 lb 14.2 oz)  Today's BMI: Body mass index is 33.45 kg/m².  Wt Readings from Last 3 Encounters:   02/07/25 88.4 kg (194 lb 14.2 oz)   11/15/24 85.3 kg (188 lb 0.8 oz)   08/19/24 85.3 kg (188 lb 0.8 oz)     Weight loss recommended

## 2025-02-07 NOTE — ASSESSMENT & PLAN NOTE
Patient is adherent to his CPAP, he would like a f/u with sleep medicine.  I have placed the referral.

## 2025-02-20 DIAGNOSIS — I10 PRIMARY HYPERTENSION: ICD-10-CM

## 2025-02-20 RX ORDER — LOSARTAN POTASSIUM 100 MG/1
100 TABLET ORAL DAILY
Qty: 90 TABLET | Refills: 3 | Status: SHIPPED | OUTPATIENT
Start: 2025-02-20 | End: 2026-02-20

## 2025-02-21 ENCOUNTER — TELEPHONE (OUTPATIENT)
Dept: INTERNAL MEDICINE | Facility: CLINIC | Age: 60
End: 2025-02-21

## 2025-02-21 ENCOUNTER — RESULTS FOLLOW-UP (OUTPATIENT)
Dept: INTERNAL MEDICINE | Facility: CLINIC | Age: 60
End: 2025-02-21
Payer: COMMERCIAL

## 2025-02-21 ENCOUNTER — PATIENT MESSAGE (OUTPATIENT)
Dept: INTERNAL MEDICINE | Facility: CLINIC | Age: 60
End: 2025-02-21
Payer: COMMERCIAL

## 2025-02-21 ENCOUNTER — HOSPITAL ENCOUNTER (OUTPATIENT)
Dept: RADIOLOGY | Facility: HOSPITAL | Age: 60
Discharge: HOME | End: 2025-02-21
Attending: STUDENT IN AN ORGANIZED HEALTH CARE EDUCATION/TRAINING PROGRAM
Payer: COMMERCIAL

## 2025-02-21 DIAGNOSIS — N32.89 MASS OF BLADDER: ICD-10-CM

## 2025-02-21 DIAGNOSIS — Q61.02 MULTIPLE RENAL CYSTS: ICD-10-CM

## 2025-02-21 DIAGNOSIS — N32.89: Primary | ICD-10-CM

## 2025-02-21 PROCEDURE — 76770 US EXAM ABDO BACK WALL COMP: CPT | Mod: 26,,, | Performed by: RADIOLOGY

## 2025-02-21 PROCEDURE — 76770 US EXAM ABDO BACK WALL COMP: CPT | Mod: TC

## 2025-02-21 NOTE — TELEPHONE ENCOUNTER
US Retroperitoneal Complete  Narrative: EXAMINATION:  US RETROPERITONEAL COMPLETE    CLINICAL HISTORY:  Congenital multiple renal cysts    TECHNIQUE:  Ultrasound of the kidneys and urinary bladder was performed including color flow and Doppler evaluation of the kidneys.    COMPARISON:  10/19/2023    FINDINGS:  The right kidney is normal in length measuring 11.2 cm. The left kidney is normal in length measuring 11.0 cm. Segmental arterial resistive indices are within normal limits. Three right renal cysts noted; midpole cyst 1.4 cm, lower pole cyst 1.6 cm, adjacent cyst 1.6 cm.  2 left renal cysts; midpole cyst 1.0 cm, lower pole cyst 1.3 cm.  No hydronephrosis or renal masses identified.  Small mass along the bladder wall measuring 1.4 cm..    Prostate measures 2.3 x 3.6 x 3.8 cm.  Impression: Bilateral renal cysts.  No masses or hydronephrosis identified.    Indeterminate small (1.4 cm) mass along the bladder wall, possible adherent debris.  Short-term imaging follow-up could be performed to ensure resolution or further evaluation with CT urogram.    Electronically signed by: Lexa Chung MD  Date:    02/21/2025  Time:    08:17    Given findings of small mass along the bladder I am referring patient to urology for evaluation.  Patient notified.

## 2025-03-17 ENCOUNTER — OFFICE VISIT (OUTPATIENT)
Dept: UROLOGY | Facility: CLINIC | Age: 60
End: 2025-03-17
Payer: COMMERCIAL

## 2025-03-17 VITALS
BODY MASS INDEX: 33.44 KG/M2 | SYSTOLIC BLOOD PRESSURE: 152 MMHG | HEART RATE: 78 BPM | HEIGHT: 64 IN | DIASTOLIC BLOOD PRESSURE: 80 MMHG | WEIGHT: 195.88 LBS

## 2025-03-17 DIAGNOSIS — I10 PRIMARY HYPERTENSION: ICD-10-CM

## 2025-03-17 DIAGNOSIS — N32.89 MASS OF BLADDER: ICD-10-CM

## 2025-03-17 PROCEDURE — 99999 PR PBB SHADOW E&M-EST. PATIENT-LVL III: CPT | Mod: PBBFAC,,, | Performed by: UROLOGY

## 2025-03-17 PROCEDURE — 99214 OFFICE O/P EST MOD 30 MIN: CPT | Mod: S$GLB,,, | Performed by: UROLOGY

## 2025-03-17 RX ORDER — LOSARTAN POTASSIUM 100 MG/1
100 TABLET ORAL DAILY
Qty: 90 TABLET | Refills: 3 | Status: SHIPPED | OUTPATIENT
Start: 2025-03-17 | End: 2026-03-17

## 2025-03-17 NOTE — PROGRESS NOTES
Subjective:       Patient ID: Jerome Almazan is a 59 y.o. male.    Chief Complaint: renal cysts     This is a 59 y.o.  male patient who is new to me but not new to the system.  Previous patient of Dr. Ybarra seen for elevated PSA.  Most recent PSA last month was normal.  Referred back to urology for questionable bladder mass on renal US obtained 2/21/2025.  Denies gross hematuria, flank pain.  No family hx of bladder cancer.  Never smoker.        Previously abnormal PSA: yes. Previous biopsy: yes, biopsy Dr. Levi 2011 was negative 30 g prostate at time. Previous abnormal JASON: no.  Family history of prostate cancer: no.  Blood thinners:  no.  No significant LUTs, no hematuria.  No dysuria.        PSA  02/25--3.4   10/23--3.2  9/23--4.8  6/22--3.6  2/22--4.3      LAST PSA  Lab Results   Component Value Date    PSA 3.4 02/07/2025    PSA 2.9 03/22/2024    PSA 3.2 10/06/2023    PSA 4.8 (H) 09/22/2023    PSA 4.3 (H) 02/11/2022    PSA 3.7 06/04/2021    PSA 4.3 (H) 05/06/2021    PSA 3.7 07/31/2020    PSA 3.0 04/12/2019    PSA 3.1 03/13/2019    PSA 2.9 04/27/2018    PSA 4.1 (H) 02/17/2017    PSA 3.6 11/11/2016    PSA 3.0 07/10/2015    PSA 3.9 09/19/2014    PSA 3.78 03/26/2013    PSA 2.4 09/21/2012    PSA 2.70 03/09/2012    PSA 3.6 09/16/2011    PSA 3.8 06/24/2011    PSADIAG 3.6 06/24/2022       Lab Results   Component Value Date    CREATININE 1.1 02/07/2025       ---  PMH/PSH/PFH/Meds/Allergies/Social reviewed as in chart.         Review of Systems   Constitutional:  Negative for activity change, chills and fever.   HENT:  Negative for congestion.    Respiratory:  Negative for cough, chest tightness and shortness of breath.    Cardiovascular:  Negative for chest pain and palpitations.   Gastrointestinal:  Negative for abdominal distention, abdominal pain, nausea and vomiting.   Genitourinary:  Negative for difficulty urinating, flank pain, hematuria, penile pain, scrotal swelling and testicular pain.    Musculoskeletal:  Negative for gait problem.       Objective:      Physical Exam  Constitutional:       Appearance: Normal appearance.   HENT:      Head: Normocephalic.   Pulmonary:      Effort: Pulmonary effort is normal.      Breath sounds: Normal breath sounds.   Abdominal:      General: Abdomen is flat. Bowel sounds are normal.      Palpations: Abdomen is soft.      Tenderness: There is no abdominal tenderness. There is no right CVA tenderness, left CVA tenderness or guarding.   Genitourinary:     Penis: Normal.       Testes: Normal.      Prostate: Normal.   Musculoskeletal:         General: Normal range of motion.      Cervical back: Normal range of motion.   Skin:     General: Skin is warm.   Neurological:      Mental Status: He is alert.         GUICHO 10/23:  Multiple right simple to minimally complex renal cysts.  Echogenic layering material within the bladder.  Correlation for cystitis is recommended.  Mild prostatomegaly.      GUICHO 2/21/25:  Bilateral renal cysts.  No masses or hydronephrosis identified.   Indeterminate small (1.4 cm) mass along the bladder wall, possible adherent debris.  Short-term imaging follow-up could be performed to ensure resolution or further evaluation with CT urogram.        Assessment:     Problem Noted   Mass of Bladder 3/17/2025       Plan:     Recommended CT urogram to evaluate for bladder mass.        Discussion: The natural history of prostate cancer and ongoing controversy regarding screening was discussed with the patient. The potential treatment outcomes of prostate cancer were explained. The meaning of a false positive PSA and a false negative PSA has been discussed. He understands that PSA is a screening blood test. If elevated, it could be due to an enlarged prostate, inflammation of the prostate, infection of the prostate, or prostate cancer.      Esther Cosme MD

## 2025-03-21 ENCOUNTER — HOSPITAL ENCOUNTER (OUTPATIENT)
Dept: RADIOLOGY | Facility: HOSPITAL | Age: 60
Discharge: HOME | End: 2025-03-21
Payer: COMMERCIAL

## 2025-03-21 DIAGNOSIS — N32.89 MASS OF BLADDER: ICD-10-CM

## 2025-03-21 PROCEDURE — 25500020 PHARM REV CODE 255

## 2025-03-21 PROCEDURE — 74178 CT ABD&PLV WO CNTR FLWD CNTR: CPT | Mod: TC

## 2025-03-21 PROCEDURE — 74178 CT ABD&PLV WO CNTR FLWD CNTR: CPT | Mod: 26,,, | Performed by: RADIOLOGY

## 2025-03-21 RX ADMIN — IOHEXOL 150 ML: 350 INJECTION, SOLUTION INTRAVENOUS at 09:03

## 2025-04-17 DIAGNOSIS — I10 PRIMARY HYPERTENSION: ICD-10-CM

## 2025-04-17 RX ORDER — LOSARTAN POTASSIUM 100 MG/1
100 TABLET ORAL DAILY
Qty: 90 TABLET | Refills: 3 | Status: SHIPPED | OUTPATIENT
Start: 2025-04-17 | End: 2026-04-17

## 2025-04-29 ENCOUNTER — OFFICE VISIT (OUTPATIENT)
Dept: SLEEP MEDICINE | Facility: CLINIC | Age: 60
End: 2025-04-29
Payer: COMMERCIAL

## 2025-04-29 VITALS
WEIGHT: 200.06 LBS | BODY MASS INDEX: 34.16 KG/M2 | HEART RATE: 69 BPM | DIASTOLIC BLOOD PRESSURE: 81 MMHG | SYSTOLIC BLOOD PRESSURE: 137 MMHG | HEIGHT: 64 IN

## 2025-04-29 DIAGNOSIS — G47.33 OBSTRUCTIVE SLEEP APNEA: Primary | ICD-10-CM

## 2025-04-29 PROCEDURE — 99214 OFFICE O/P EST MOD 30 MIN: CPT | Mod: S$GLB,,, | Performed by: NURSE PRACTITIONER

## 2025-04-29 PROCEDURE — 99999 PR PBB SHADOW E&M-EST. PATIENT-LVL III: CPT | Mod: PBBFAC,,, | Performed by: NURSE PRACTITIONER

## 2025-04-29 NOTE — PROGRESS NOTES
"  ESTABLISHED PATIENT VISIT    Jerome Almazan  is a pleasant 59 y.o. male established with the [facility]    Here today for:  follow-up on LUCY    Since last visit:   See assessment below      Past Medical History:   Diagnosis Date    Abnormal PSA 4/5/2013    Hypertension      Problem List[1]  Current Medications[2]       Vitals:    04/29/25 1440   BP: 137/81   BP Location: Left arm   Patient Position: Sitting   Pulse: 69   Weight: 90.8 kg (200 lb 1.1 oz)   Height: 5' 4" (1.626 m)     Physical Exam:    GEN:   Well-appearing  Psych:  Appropriate affect, demonstrates insight  SKIN:  No rash on the face or bridge of the nose      LABS:   Lab Results   Component Value Date    HGB 14.0 02/07/2025    CO2 27 02/07/2025         RECORDS REVIEWED:      ASSESSMENT    Sig PMH:  PROBLEM DESCRIPTION/ Sx on Presentation Interval Hx STATUS PLAN     LUCY   Presentation:     LOV: Woody 3.24.22 for cpap compliance    PAP history   Dx Study HST 7.30.21: AHI 13, RDI 26, <90% x 3.4%    Machine age AV, setup 6.01.23, Airsense 10   Mask FFM   DME HME   My Air    PAP altn    Benefits    PROBS         Since last visit:     Uses most nights; sleeps better with the         Download 04/29/2025:  04/29/2025 : 26/30 x 5h49m: 5-12 (8.2/11.2/11.8), leak 16.6/31.5/45, PS 3, AHI 1.5 >>>increase Imax to 13     controlled   PAP PLAN   E min 5 cwp (cont)   I max 13 cwp (from 12)   PS/epr    RAMP 4 x 10 min (switched from 30 min)   Changes Will increase Imax from 12 to 13; reaching limit on today's interrogation/download.        Residual predicted AHI within optimal range.    Pt is using and benefitting from CPAP therapy.    Rx supplies           Daytime Sx     ESS 4/24 on intake           ESS 2/24 on intake   controlled   -continue treatment of LUCY as above     Nocturia     x 1 per sleep period    x 0 per sleep period   controlled          RTC:  yearly or sooner if problems arise              [1]   Patient Active Problem " List  Diagnosis    Elevated PSA    Hypertension, essential    LUCY (obstructive sleep apnea)    Right shoulder pain    Non-recurrent bilateral inguinal hernia without obstruction or gangrene    Body mass index (BMI) of 31.0 to 31.9 in adult    Multiple renal cysts    Mass of bladder   [2]   Current Outpatient Medications:     aspirin (ECOTRIN) 81 MG EC tablet, Take 81 mg by mouth once daily., Disp: , Rfl:     losartan (COZAAR) 100 MG tablet, Take 1 tablet (100 mg total) by mouth once daily., Disp: 90 tablet, Rfl: 3    multivitamin capsule, Take 1 capsule by mouth once daily., Disp: , Rfl:

## 2025-05-20 DIAGNOSIS — I10 PRIMARY HYPERTENSION: ICD-10-CM

## 2025-05-20 RX ORDER — LOSARTAN POTASSIUM 100 MG/1
100 TABLET ORAL DAILY
Qty: 90 TABLET | Refills: 3 | Status: SHIPPED | OUTPATIENT
Start: 2025-05-20 | End: 2026-05-20

## 2025-06-03 ENCOUNTER — TELEPHONE (OUTPATIENT)
Dept: INTERNAL MEDICINE | Facility: CLINIC | Age: 60
End: 2025-06-03
Payer: COMMERCIAL

## 2025-06-06 ENCOUNTER — OFFICE VISIT (OUTPATIENT)
Dept: INTERNAL MEDICINE | Facility: CLINIC | Age: 60
End: 2025-06-06
Payer: COMMERCIAL

## 2025-06-06 ENCOUNTER — HOSPITAL ENCOUNTER (OUTPATIENT)
Dept: RADIOLOGY | Facility: HOSPITAL | Age: 60
Discharge: HOME | End: 2025-06-06
Payer: COMMERCIAL

## 2025-06-06 VITALS
WEIGHT: 200.63 LBS | BODY MASS INDEX: 34.25 KG/M2 | HEIGHT: 64 IN | OXYGEN SATURATION: 97 % | HEART RATE: 72 BPM | DIASTOLIC BLOOD PRESSURE: 76 MMHG | SYSTOLIC BLOOD PRESSURE: 122 MMHG

## 2025-06-06 DIAGNOSIS — M25.562 PAIN IN BOTH KNEES, UNSPECIFIED CHRONICITY: Primary | ICD-10-CM

## 2025-06-06 DIAGNOSIS — M25.561 PAIN IN BOTH KNEES, UNSPECIFIED CHRONICITY: ICD-10-CM

## 2025-06-06 DIAGNOSIS — M25.561 PAIN IN BOTH KNEES, UNSPECIFIED CHRONICITY: Primary | ICD-10-CM

## 2025-06-06 DIAGNOSIS — M25.562 PAIN IN BOTH KNEES, UNSPECIFIED CHRONICITY: ICD-10-CM

## 2025-06-06 DIAGNOSIS — G56.01 RIGHT CARPAL TUNNEL SYNDROME: ICD-10-CM

## 2025-06-06 PROCEDURE — 73562 X-RAY EXAM OF KNEE 3: CPT | Mod: 26,50,, | Performed by: RADIOLOGY

## 2025-06-06 PROCEDURE — 73562 X-RAY EXAM OF KNEE 3: CPT | Mod: TC,50

## 2025-06-06 PROCEDURE — 99999 PR PBB SHADOW E&M-EST. PATIENT-LVL IV: CPT | Mod: PBBFAC,,,

## 2025-06-09 ENCOUNTER — RESULTS FOLLOW-UP (OUTPATIENT)
Dept: HEPATOLOGY | Facility: HOSPITAL | Age: 60
End: 2025-06-09

## 2025-06-17 DIAGNOSIS — I10 PRIMARY HYPERTENSION: ICD-10-CM

## 2025-06-17 RX ORDER — LOSARTAN POTASSIUM 100 MG/1
100 TABLET ORAL DAILY
Qty: 90 TABLET | Refills: 3 | Status: SHIPPED | OUTPATIENT
Start: 2025-06-17 | End: 2026-06-17

## 2025-07-17 DIAGNOSIS — I10 PRIMARY HYPERTENSION: ICD-10-CM

## 2025-07-17 RX ORDER — LOSARTAN POTASSIUM 100 MG/1
100 TABLET ORAL DAILY
Qty: 90 TABLET | Refills: 3 | Status: SHIPPED | OUTPATIENT
Start: 2025-07-17 | End: 2026-07-17

## (undated) DEVICE — SEE L#120831

## (undated) DEVICE — ELECTRODE REM PLYHSV RETURN 9

## (undated) DEVICE — PACK SURGERY START

## (undated) DEVICE — GOWN POLY REINF BRTH SLV LG

## (undated) DEVICE — BLADE SCALP OPHTL BEVEL STR

## (undated) DEVICE — DRAPE HAND STERILE

## (undated) DEVICE — APPLICATOR CHLORAPREP ORN 26ML

## (undated) DEVICE — TOURNIQUET SB QC DP 18X4IN

## (undated) DEVICE — SPONGE COTTON TRAY 4X4IN

## (undated) DEVICE — STOCKINET 4INX48

## (undated) DEVICE — ADHESIVE DERMABOND ADVANCED

## (undated) DEVICE — BLADE SURG #15 CARBON STEEL

## (undated) DEVICE — ALCOHOL 70% ISOP W/GREEN 16OZ

## (undated) DEVICE — PAD PREP CUFFED NS 24X48IN

## (undated) DEVICE — COVER LIGHT HANDLE 80/CA

## (undated) DEVICE — BANDAGE MATRIX HK LOOP 2IN 5YD

## (undated) DEVICE — TOWEL OR DISP STRL BLUE 4/PK

## (undated) DEVICE — SUT MONOCRYL 4-0 PS-2

## (undated) DEVICE — PACK ECLIPSE BASIC III SURG

## (undated) DEVICE — PAD CAST SPECIALIST 2X4

## (undated) DEVICE — GLOVE SURG BIOGEL LATEX SZ 7.5

## (undated) DEVICE — SYR 10CC LUER LOCK

## (undated) DEVICE — BANDAGE ESMARK ELASTIC ST 4X9

## (undated) DEVICE — GOWN POLY REINF BRTH SLV XL